# Patient Record
Sex: FEMALE | Race: WHITE | NOT HISPANIC OR LATINO | Employment: FULL TIME | URBAN - NONMETROPOLITAN AREA
[De-identification: names, ages, dates, MRNs, and addresses within clinical notes are randomized per-mention and may not be internally consistent; named-entity substitution may affect disease eponyms.]

---

## 2017-01-11 ENCOUNTER — OFFICE VISIT (OUTPATIENT)
Dept: RETAIL CLINIC | Facility: CLINIC | Age: 27
End: 2017-01-11

## 2017-01-11 VITALS
WEIGHT: 163.8 LBS | TEMPERATURE: 98.7 F | RESPIRATION RATE: 18 BRPM | BODY MASS INDEX: 29.02 KG/M2 | OXYGEN SATURATION: 97 % | HEART RATE: 85 BPM | SYSTOLIC BLOOD PRESSURE: 110 MMHG | DIASTOLIC BLOOD PRESSURE: 72 MMHG | HEIGHT: 63 IN

## 2017-01-11 DIAGNOSIS — B02.9 HERPES ZOSTER WITHOUT COMPLICATION: Primary | ICD-10-CM

## 2017-01-11 PROCEDURE — 99213 OFFICE O/P EST LOW 20 MIN: CPT | Performed by: NURSE PRACTITIONER

## 2017-01-11 RX ORDER — ALPRAZOLAM 0.25 MG/1
0.25 TABLET ORAL AS NEEDED
COMMUNITY
End: 2018-04-17 | Stop reason: ALTCHOICE

## 2017-01-11 RX ORDER — METHYLPREDNISOLONE 4 MG/1
TABLET ORAL
Qty: 21 TABLET | Refills: 0 | Status: SHIPPED | OUTPATIENT
Start: 2017-01-11 | End: 2018-04-17

## 2017-01-11 RX ORDER — NORGESTIMATE AND ETHINYL ESTRADIOL 7DAYSX3 LO
1 KIT ORAL DAILY
COMMUNITY
End: 2017-11-20

## 2017-01-11 RX ORDER — VALACYCLOVIR HYDROCHLORIDE 1 G/1
1000 TABLET, FILM COATED ORAL 3 TIMES DAILY
Qty: 21 TABLET | Refills: 0 | Status: SHIPPED | OUTPATIENT
Start: 2017-01-11 | End: 2017-01-18

## 2017-01-11 NOTE — PATIENT INSTRUCTIONS
Shingles  Shingles, which is also known as herpes zoster, is an infection that causes a painful skin rash and fluid-filled blisters. Shingles is not related to genital herpes, which is a sexually transmitted infection.     Shingles only develops in people who:  · Have had chickenpox.  · Have received the chickenpox vaccine. (This is rare.)  CAUSES  Shingles is caused by varicella-zoster virus (VZV). This is the same virus that causes chickenpox. After exposure to VZV, the virus stays in the body in an inactive (dormant) state. Shingles develops if the virus reactivates. This can happen many years after the initial exposure to VZV. It is not known what causes this virus to reactivate.  RISK FACTORS  People who have had chickenpox or received the chickenpox vaccine are at risk for shingles. Infection is more common in people who:  · Are older than age 50.  · Have a weakened defense (immune) system, such as those with HIV, AIDS, or cancer.  · Are taking medicines that weaken the immune system, such as transplant medicines.  · Are under great stress.  SYMPTOMS  Early symptoms of this condition include itching, tingling, and pain in an area on your skin. Pain may be described as burning, stabbing, or throbbing.  A few days or weeks after symptoms start, a painful red rash appears, usually on one side of the body in a bandlike or beltlike pattern. The rash eventually turns into fluid-filled blisters that break open, scab over, and dry up in about 2-3 weeks.  At any time during the infection, you may also develop:  · A fever.  · Chills.  · A headache.  · An upset stomach.  DIAGNOSIS  This condition is diagnosed with a skin exam. Sometimes, skin or fluid samples are taken from the blisters before a diagnosis is made. These samples are examined under a microscope or sent to a lab for testing.  TREATMENT  There is no specific cure for this condition. Your health care provider will probably prescribe medicines to help you  manage pain, recover more quickly, and avoid long-term problems. Medicines may include:  · Antiviral drugs.  · Anti-inflammatory drugs.  · Pain medicines.  If the area involved is on your face, you may be referred to a specialist, such as an eye doctor (ophthalmologist) or an ear, nose, and throat (ENT) doctor to help you avoid eye problems, chronic pain, or disability.  HOME CARE INSTRUCTIONS  Medicines  · Take medicines only as directed by your health care provider.  · Apply an anti-itch or numbing cream to the affected area as directed by your health care provider.  Blister and Rash Care  · Take a cool bath or apply cool compresses to the area of the rash or blisters as directed by your health care provider. This may help with pain and itching.  · Keep your rash covered with a loose bandage (dressing). Wear loose-fitting clothing to help ease the pain of material rubbing against the rash.  · Keep your rash and blisters clean with mild soap and cool water or as directed by your health care provider.  · Check your rash every day for signs of infection. These include redness, swelling, and pain that lasts or increases.  · Do not pick your blisters.  · Do not scratch your rash.  General Instructions  · Rest as directed by your health care provider.  · Keep all follow-up visits as directed by your health care provider. This is important.  · Until your blisters scab over, your infection can cause chickenpox in people who have never had it or been vaccinated against it. To prevent this from happening, avoid contact with other people, especially:    Babies.    Pregnant women.    Children who have eczema.    Elderly people who have transplants.    People who have chronic illnesses, such as leukemia or AIDS.  SEEK MEDICAL CARE IF:  · Your pain is not relieved with prescribed medicines.  · Your pain does not get better after the rash heals.  · Your rash looks infected. Signs of infection include redness, swelling, and pain  that lasts or increases.  SEEK IMMEDIATE MEDICAL CARE IF:  · The rash is on your face or nose.  · You have facial pain, pain around your eye area, or loss of feeling on one side of your face.  · You have ear pain or you have ringing in your ear.  · You have loss of taste.  · Your condition gets worse.     This information is not intended to replace advice given to you by your health care provider. Make sure you discuss any questions you have with your health care provider.     Document Released: 12/18/2006 Document Revised: 01/08/2016 Document Reviewed: 10/29/2015  [employer] Interactive Patient Education ©2016 [employer]    Valacyclovir caplets  What is this medicine?  VALACYCLOVIR (ewa ay SYE kloe veer) is an antiviral medicine. It is used to treat or prevent infections caused by certain kinds of viruses. Examples of these infections include herpes and shingles. This medicine will not cure herpes.  This medicine may be used for other purposes; ask your health care provider or pharmacist if you have questions.  What should I tell my health care provider before I take this medicine?  They need to know if you have any of these conditions:  -acquired immunodeficiency syndrome (AIDS)  -any other condition that may weaken the immune system  -bone marrow or kidney transplant  -kidney disease  -an unusual or allergic reaction to valacyclovir, acyclovir, ganciclovir, valganciclovir, other medicines, foods, dyes, or preservatives  -pregnant or trying to get pregnant  -breast-feeding  How should I use this medicine?  Take this medicine by mouth with a glass of water. Follow the directions on the prescription label. You can take this medicine with or without food. Take your doses at regular intervals. Do not take your medicine more often than directed. Finish the full course prescribed by your doctor or health care professional even if you think your condition is better. Do not stop taking except on the advice of your doctor  or health care professional.  Talk to your pediatrician regarding the use of this medicine in children. While this drug may be prescribed for children as young as 2 years for selected conditions, precautions do apply.  Overdosage: If you think you have taken too much of this medicine contact a poison control center or emergency room at once.  NOTE: This medicine is only for you. Do not share this medicine with others.  What if I miss a dose?  If you miss a dose, take it as soon as you can. If it is almost time for your next dose, take only that dose. Do not take double or extra doses.  What may interact with this medicine?  -cimetidine  -probenecid  This list may not describe all possible interactions. Give your health care provider a list of all the medicines, herbs, non-prescription drugs, or dietary supplements you use. Also tell them if you smoke, drink alcohol, or use illegal drugs. Some items may interact with your medicine.  What should I watch for while using this medicine?  Tell your doctor or health care professional if your symptoms do not start to get better after 1 week.  This medicine works best when taken early in the course of an infection, within the first 72 hours. Begin treatment as soon as possible after the first signs of infection like tingling, itching, or pain in the affected area.  It is possible that genital herpes may still be spread even when you are not having symptoms. Always use safer sex practices like condoms made of latex or polyurethane whenever you have sexual contact.  You should stay well hydrated while taking this medicine. Drink plenty of fluids.  What side effects may I notice from receiving this medicine?  Side effects that you should report to your doctor or health care professional as soon as possible:  -allergic reactions like skin rash, itching or hives, swelling of the face, lips, or tongue  -aggressive behavior  -confusion  -hallucinations  -problems with balance,  talking, walking  -stomach pain  -tremor  -trouble passing urine or change in the amount of urine  Side effects that usually do not require medical attention (report to your doctor or health care professional if they continue or are bothersome):  -dizziness  -headache  -nausea, vomiting  This list may not describe all possible side effects. Call your doctor for medical advice about side effects. You may report side effects to FDA at 2-320-FDA-9193.  Where should I keep my medicine?  Keep out of the reach of children.  Store at room temperature between 15 and 25 degrees C (59 and 77 degrees F). Keep container tightly closed. Throw away any unused medicine after the expiration date.  NOTE: This sheet is a summary. It may not cover all possible information. If you have questions about this medicine, talk to your doctor, pharmacist, or health care provider.     © 2016, [employer]. (2013-12-03 16:34:05)      Methylprednisolone tablets  What is this medicine?  METHYLPREDNISOLONE (meth ill pred NISS oh lone) is a corticosteroid. It is commonly used to treat inflammation of the skin, joints, lungs, and other organs. Common conditions treated include asthma, allergies, and arthritis. It is also used for other conditions, such as blood disorders and diseases of the adrenal glands.  This medicine may be used for other purposes; ask your health care provider or pharmacist if you have questions.  What should I tell my health care provider before I take this medicine?  They need to know if you have any of these conditions:  -Cushing's syndrome  -diabetes  -glaucoma  -heart problems or disease  -high blood pressure  -infection such as herpes, measles, tuberculosis, or chickenpox  -kidney disease  -liver disease  -mental problems  -myasthenia gravis  -osteoporosis  -seizures  -stomach ulcer or intestine disease including colitis and diverticulitis  -thyroid problem  -an unusual or allergic reaction to lactose,  methylprednisolone, other medicines, foods, dyes, or preservatives  -pregnant or trying to get pregnant  -breast-feeding  How should I use this medicine?  Take this medicine by mouth with a drink of water. Follow the directions on the prescription label. Take it with food or milk to avoid stomach upset. If you are taking this medicine once a day, take it in the morning. Do not take more medicine than you are told to take. Do not suddenly stop taking your medicine because you may develop a severe reaction. Your doctor will tell you how much medicine to take. If your doctor wants you to stop the medicine, the dose may be slowly lowered over time to avoid any side effects.  Talk to your pediatrician regarding the use of this medicine in children. Special care may be needed.  Overdosage: If you think you have taken too much of this medicine contact a poison control center or emergency room at once.  NOTE: This medicine is only for you. Do not share this medicine with others.  What if I miss a dose?  If you miss a dose, take it as soon as you can. If it is almost time for your next dose, talk to your doctor or health care professional. You may need to miss a dose or take an extra dose. Do not take double or extra doses without advice.  What may interact with this medicine?  Do not take this medicine with any of the following medications:  -mifepristone  This medicine may also interact with the following medications:  -tacrolimus  -vaccines  -warfarin  This list may not describe all possible interactions. Give your health care provider a list of all the medicines, herbs, non-prescription drugs, or dietary supplements you use. Also tell them if you smoke, drink alcohol, or use illegal drugs. Some items may interact with your medicine.  What should I watch for while using this medicine?  Visit your doctor or health care professional for regular checks on your progress. If you are taking this medicine for a long time, carry  an identification card with your name and address, the type and dose of your medicine, and your doctor's name and address.  The medicine may increase your risk of getting an infection. Stay away from people who are sick. Tell your doctor or health care professional if you are around anyone with measles or chickenpox.  If you are going to have surgery, tell your doctor or health care professional that you have taken this medicine within the last twelve months.  Ask your doctor or health care professional about your diet. You may need to lower the amount of salt you eat.  The medicine can increase your blood sugar. If you are a diabetic check with your doctor if you need help adjusting the dose of your diabetic medicine.  What side effects may I notice from receiving this medicine?  Side effects that you should report to your doctor or health care professional as soon as possible:  -allergic reactions like skin rash, itching or hives, swelling of the face, lips, or tongue  -eye pain, decreased or blurred vision, or bulging eyes  -fever, sore throat, sneezing, cough, or other signs of infection, wounds that will not heal  -increased thirst  -mental depression, mood swings, mistaken feelings of self importance or of being mistreated  -pain in hips, back, ribs, arms, shoulders, or legs  -swelling of the ankles, feet, hands  -trouble passing urine or change in the amount of urine  Side effects that usually do not require medical attention (report to your doctor or health care professional if they continue or are bothersome):  -confusion, excitement, restlessness  -headache  -nausea, vomiting  -skin problems, acne, thin and shiny skin  -weight gain  This list may not describe all possible side effects. Call your doctor for medical advice about side effects. You may report side effects to FDA at 6-201-FDA-2347.  Where should I keep my medicine?  Keep out of the reach of children.  Store at room temperature between 20 and 25  degrees C (68 and 77 degrees F). Throw away any unused medicine after the expiration date.  NOTE: This sheet is a summary. It may not cover all possible information. If you have questions about this medicine, talk to your doctor, pharmacist, or health care provider.     © 2016, [employer]. (2013-09-17 11:38:34)    Risks and benefits of medications discussed.  Keep lesions clean and dry. Monitor for signs and symptoms of secondary infection as discussed. Currently no evidence of eye involvement as the rash is low on your face.  If you have rash develop near your eye or you have concern about eye involvement please seek immediate evaluation (see your primary care, eye doctor or go to ER if needed) as you will need to be seen by an opthalmologist. You have verbalized understanding.

## 2017-01-11 NOTE — MR AVS SNAPSHOT
Lissa Aragon   1/11/2017 9:30 AM   Office Visit    Dept Phone:  [phone]   Encounter #:  [id number]    Provider:  JUAN VILLA [facility]   Department:  [facility]                Your Full Care Plan              Today's Medication Changes          These changes are accurate as of: 1/11/17 10:21 AM.  If you have any questions, ask your nurse or doctor.               New Medication(s)Ordered:     MethylPREDNISolone 4 MG tablet   Commonly known as:  MEDROL (KYLAH)   Take as directed on package instructions.       valACYclovir 1000 MG tablet   Commonly known as:  VALTREX   Take 1 tablet by mouth 3 (Three) Times a Day for 7 days.            Where to Get Your Medications      These medications were sent to [facility] [address] [place] [address] [phone] [place] [id number] [address] [address]     Phone:  [phone]     MethylPREDNISolone 4 MG tablet    valACYclovir 1000 MG tablet                  Your Updated Medication List          This list is accurate as of: 1/11/17 10:21 AM.  Always use your most recent med list.                ALPRAZolam 0.25 MG tablet   Commonly known as:  XANAX       MethylPREDNISolone 4 MG tablet   Commonly known as:  MEDROL (KYLAH)   Take as directed on package instructions.       ORTHO TRI-CYCLEN LO 0.18/0.215/0.25 MG-25 MCG per tablet   Generic drug:  norgestimate-ethinyl estradiol       TRINTELLIX 10 MG tablet   Generic drug:  Vortioxetine HBr       valACYclovir 1000 MG tablet   Commonly known as:  VALTREX   Take 1 tablet by mouth 3 (Three) Times a Day for 7 days.               You Were Diagnosed With        Codes Comments    Herpes zoster without complication    -  Primary ICD-10-CM: B02.9  ICD-9-CM: 053.9       Instructions    Shingles  Shingles, which is also known as herpes zoster, is an infection that causes a painful skin rash and fluid-filled blisters. Shingles is not related to genital herpes,  which is a sexually transmitted infection.     Shingles only develops in people who:  · Have had chickenpox.  · Have received the chickenpox vaccine. (This is rare.)  CAUSES  Shingles is caused by varicella-zoster virus (VZV). This is the same virus that causes chickenpox. After exposure to VZV, the virus stays in the body in an inactive (dormant) state. Shingles develops if the virus reactivates. This can happen many years after the initial exposure to VZV. It is not known what causes this virus to reactivate.  RISK FACTORS  People who have had chickenpox or received the chickenpox vaccine are at risk for shingles. Infection is more common in people who:  · Are older than age 50.  · Have a weakened defense (immune) system, such as those with HIV, AIDS, or cancer.  · Are taking medicines that weaken the immune system, such as transplant medicines.  · Are under great stress.  SYMPTOMS  Early symptoms of this condition include itching, tingling, and pain in an area on your skin. Pain may be described as burning, stabbing, or throbbing.  A few days or weeks after symptoms start, a painful red rash appears, usually on one side of the body in a bandlike or beltlike pattern. The rash eventually turns into fluid-filled blisters that break open, scab over, and dry up in about 2-3 weeks.  At any time during the infection, you may also develop:  · A fever.  · Chills.  · A headache.  · An upset stomach.  DIAGNOSIS  This condition is diagnosed with a skin exam. Sometimes, skin or fluid samples are taken from the blisters before a diagnosis is made. These samples are examined under a microscope or sent to a lab for testing.  TREATMENT  There is no specific cure for this condition. Your health care provider will probably prescribe medicines to help you manage pain, recover more quickly, and avoid long-term problems. Medicines may include:  · Antiviral drugs.  · Anti-inflammatory drugs.  · Pain medicines.  If the area involved is  on your face, you may be referred to a specialist, such as an eye doctor (ophthalmologist) or an ear, nose, and throat (ENT) doctor to help you avoid eye problems, chronic pain, or disability.  HOME CARE INSTRUCTIONS  Medicines  · Take medicines only as directed by your health care provider.  · Apply an anti-itch or numbing cream to the affected area as directed by your health care provider.  Blister and Rash Care  · Take a cool bath or apply cool compresses to the area of the rash or blisters as directed by your health care provider. This may help with pain and itching.  · Keep your rash covered with a loose bandage (dressing). Wear loose-fitting clothing to help ease the pain of material rubbing against the rash.  · Keep your rash and blisters clean with mild soap and cool water or as directed by your health care provider.  · Check your rash every day for signs of infection. These include redness, swelling, and pain that lasts or increases.  · Do not pick your blisters.  · Do not scratch your rash.  General Instructions  · Rest as directed by your health care provider.  · Keep all follow-up visits as directed by your health care provider. This is important.  · Until your blisters scab over, your infection can cause chickenpox in people who have never had it or been vaccinated against it. To prevent this from happening, avoid contact with other people, especially:    Babies.    Pregnant women.    Children who have eczema.    Elderly people who have transplants.    People who have chronic illnesses, such as leukemia or AIDS.  SEEK MEDICAL CARE IF:  · Your pain is not relieved with prescribed medicines.  · Your pain does not get better after the rash heals.  · Your rash looks infected. Signs of infection include redness, swelling, and pain that lasts or increases.  SEEK IMMEDIATE MEDICAL CARE IF:  · The rash is on your face or nose.  · You have facial pain, pain around your eye area, or loss of feeling on one side of  your face.  · You have ear pain or you have ringing in your ear.  · You have loss of taste.  · Your condition gets worse.     This information is not intended to replace advice given to you by your health care provider. Make sure you discuss any questions you have with your health care provider.     Document Released: 12/18/2006 Document Revised: 01/08/2016 Document Reviewed: 10/29/2015  [employer] Interactive Patient Education ©2016 Elsevier Inc.    Valacyclovir caplets  What is this medicine?  VALACYCLOVIR (ewa ay SYE ekaterina veer) is an antiviral medicine. It is used to treat or prevent infections caused by certain kinds of viruses. Examples of these infections include herpes and shingles. This medicine will not cure herpes.  This medicine may be used for other purposes; ask your health care provider or pharmacist if you have questions.  What should I tell my health care provider before I take this medicine?  They need to know if you have any of these conditions:  -acquired immunodeficiency syndrome (AIDS)  -any other condition that may weaken the immune system  -bone marrow or kidney transplant  -kidney disease  -an unusual or allergic reaction to valacyclovir, acyclovir, ganciclovir, valganciclovir, other medicines, foods, dyes, or preservatives  -pregnant or trying to get pregnant  -breast-feeding  How should I use this medicine?  Take this medicine by mouth with a glass of water. Follow the directions on the prescription label. You can take this medicine with or without food. Take your doses at regular intervals. Do not take your medicine more often than directed. Finish the full course prescribed by your doctor or health care professional even if you think your condition is better. Do not stop taking except on the advice of your doctor or health care professional.  Talk to your pediatrician regarding the use of this medicine in children. While this drug may be prescribed for children as young as 2 years for  selected conditions, precautions do apply.  Overdosage: If you think you have taken too much of this medicine contact a poison control center or emergency room at once.  NOTE: This medicine is only for you. Do not share this medicine with others.  What if I miss a dose?  If you miss a dose, take it as soon as you can. If it is almost time for your next dose, take only that dose. Do not take double or extra doses.  What may interact with this medicine?  -cimetidine  -probenecid  This list may not describe all possible interactions. Give your health care provider a list of all the medicines, herbs, non-prescription drugs, or dietary supplements you use. Also tell them if you smoke, drink alcohol, or use illegal drugs. Some items may interact with your medicine.  What should I watch for while using this medicine?  Tell your doctor or health care professional if your symptoms do not start to get better after 1 week.  This medicine works best when taken early in the course of an infection, within the first 72 hours. Begin treatment as soon as possible after the first signs of infection like tingling, itching, or pain in the affected area.  It is possible that genital herpes may still be spread even when you are not having symptoms. Always use safer sex practices like condoms made of latex or polyurethane whenever you have sexual contact.  You should stay well hydrated while taking this medicine. Drink plenty of fluids.  What side effects may I notice from receiving this medicine?  Side effects that you should report to your doctor or health care professional as soon as possible:  -allergic reactions like skin rash, itching or hives, swelling of the face, lips, or tongue  -aggressive behavior  -confusion  -hallucinations  -problems with balance, talking, walking  -stomach pain  -tremor  -trouble passing urine or change in the amount of urine  Side effects that usually do not require medical attention (report to your  doctor or health care professional if they continue or are bothersome):  -dizziness  -headache  -nausea, vomiting  This list may not describe all possible side effects. Call your doctor for medical advice about side effects. You may report side effects to FDA at 3-792-FDA-4722.  Where should I keep my medicine?  Keep out of the reach of children.  Store at room temperature between 15 and 25 degrees C (59 and 77 degrees F). Keep container tightly closed. Throw away any unused medicine after the expiration date.  NOTE: This sheet is a summary. It may not cover all possible information. If you have questions about this medicine, talk to your doctor, pharmacist, or health care provider.     © 2016, Else[employer]/Gold Standard. (2013-12-03 16:34:05)      Methylprednisolone tablets  What is this medicine?  METHYLPREDNISOLONE (meth ill pred NISS oh lone) is a corticosteroid. It is commonly used to treat inflammation of the skin, joints, lungs, and other organs. Common conditions treated include asthma, allergies, and arthritis. It is also used for other conditions, such as blood disorders and diseases of the adrenal glands.  This medicine may be used for other purposes; ask your health care provider or pharmacist if you have questions.  What should I tell my health care provider before I take this medicine?  They need to know if you have any of these conditions:  -Cushing's syndrome  -diabetes  -glaucoma  -heart problems or disease  -high blood pressure  -infection such as herpes, measles, tuberculosis, or chickenpox  -kidney disease  -liver disease  -mental problems  -myasthenia gravis  -osteoporosis  -seizures  -stomach ulcer or intestine disease including colitis and diverticulitis  -thyroid problem  -an unusual or allergic reaction to lactose, methylprednisolone, other medicines, foods, dyes, or preservatives  -pregnant or trying to get pregnant  -breast-feeding  How should I use this medicine?  Take this medicine by mouth  with a drink of water. Follow the directions on the prescription label. Take it with food or milk to avoid stomach upset. If you are taking this medicine once a day, take it in the morning. Do not take more medicine than you are told to take. Do not suddenly stop taking your medicine because you may develop a severe reaction. Your doctor will tell you how much medicine to take. If your doctor wants you to stop the medicine, the dose may be slowly lowered over time to avoid any side effects.  Talk to your pediatrician regarding the use of this medicine in children. Special care may be needed.  Overdosage: If you think you have taken too much of this medicine contact a poison control center or emergency room at once.  NOTE: This medicine is only for you. Do not share this medicine with others.  What if I miss a dose?  If you miss a dose, take it as soon as you can. If it is almost time for your next dose, talk to your doctor or health care professional. You may need to miss a dose or take an extra dose. Do not take double or extra doses without advice.  What may interact with this medicine?  Do not take this medicine with any of the following medications:  -mifepristone  This medicine may also interact with the following medications:  -tacrolimus  -vaccines  -warfarin  This list may not describe all possible interactions. Give your health care provider a list of all the medicines, herbs, non-prescription drugs, or dietary supplements you use. Also tell them if you smoke, drink alcohol, or use illegal drugs. Some items may interact with your medicine.  What should I watch for while using this medicine?  Visit your doctor or health care professional for regular checks on your progress. If you are taking this medicine for a long time, carry an identification card with your name and address, the type and dose of your medicine, and your doctor's name and address.  The medicine may increase your risk of getting an  infection. Stay away from people who are sick. Tell your doctor or health care professional if you are around anyone with measles or chickenpox.  If you are going to have surgery, tell your doctor or health care professional that you have taken this medicine within the last twelve months.  Ask your doctor or health care professional about your diet. You may need to lower the amount of salt you eat.  The medicine can increase your blood sugar. If you are a diabetic check with your doctor if you need help adjusting the dose of your diabetic medicine.  What side effects may I notice from receiving this medicine?  Side effects that you should report to your doctor or health care professional as soon as possible:  -allergic reactions like skin rash, itching or hives, swelling of the face, lips, or tongue  -eye pain, decreased or blurred vision, or bulging eyes  -fever, sore throat, sneezing, cough, or other signs of infection, wounds that will not heal  -increased thirst  -mental depression, mood swings, mistaken feelings of self importance or of being mistreated  -pain in hips, back, ribs, arms, shoulders, or legs  -swelling of the ankles, feet, hands  -trouble passing urine or change in the amount of urine  Side effects that usually do not require medical attention (report to your doctor or health care professional if they continue or are bothersome):  -confusion, excitement, restlessness  -headache  -nausea, vomiting  -skin problems, acne, thin and shiny skin  -weight gain  This list may not describe all possible side effects. Call your doctor for medical advice about side effects. You may report side effects to FDA at 2-832-FDA-3662.  Where should I keep my medicine?  Keep out of the reach of children.  Store at room temperature between 20 and 25 degrees C (68 and 77 degrees F). Throw away any unused medicine after the expiration date.  NOTE: This sheet is a summary. It may not cover all possible information. If you  have questions about this medicine, talk to your doctor, pharmacist, or health care provider.     © 2016, Elsevier/Gold Standard. (2013 11:38:34)    Risks and benefits of medications.   Keep lesions clean and dry. Monitor for signs and symptoms of secondary infection as discussed. Currently no evidence of eye involvement as the rash is low on your face.  If you have rash develop near your eye or you have concern about eye involvement please seek immediate evaluation (see your primary care, eye doctor or go to ER if needed) as you will need to be seen by an opthalmologist. You have verbalized understanding.      Patient Instructions History      Upcoming Appointments     Visit Type Date Time Department    OFFICE VISIT 2017  9:30 AM [facility] [place]      [employer] Signup     [facility] [employer] allows you to send messages to your doctor, view your test results, renew your prescriptions, schedule appointments, and more. To sign up, go to [facility] and click on the Sign Up Now link in the New User? box. Enter your [employer] Activation Code exactly as it appears below along with the last four digits of your Social Security Number and your Date of Birth () to complete the sign-up process. If you do not sign up before the expiration date, you must request a new code.    [employer] Activation Code: [email or web address]  Expires: 2017 10:19 AM    If you have questions, you can email [email or web address] or call [phone] to talk to our [employer] staff. Remember, [employer] is NOT to be used for urgent needs. For medical emergencies, dial 911.               Other Info from Your Visit           Allergies     Ibuprofen  Swelling    Facial swelling      Reason for Visit     Earache For the last couple days-left side only    Rash Left side of face and down neck; states it is painful and burns; showed up Monday      Vital Signs     Blood Pressure Pulse Temperature Respirations Height Weight  "   110/72 (BP Location: Right arm, Patient Position: Sitting, Cuff Size: Adult) 85 98.7 °F (37.1 °C) (Oral) 18 63\" (160 cm) 163 lb 12.8 oz (74.3 kg)    Last Menstrual Period Oxygen Saturation Body Mass Index Smoking Status          12/14/2016 (Approximate) 97% 29.02 kg/m2 Never Smoker        Problems and Diagnoses Noted     Herpes zoster without complication    -  Primary        "

## 2017-01-11 NOTE — PROGRESS NOTES
Chief Complaint   Patient presents with   • Earache     For the last couple days-left side only   • Rash     Left side of face and down neck; states it is painful and burns; showed up Monday     Subjective   Lissa Aragon is a 26 y.o. female who presents to the clinic today with complaints left ear pain which started a couple of days ago. She also starting having a burning sensation to the left side of her face and neck and now has a rash in the same area.   She reports the rash is burning an painful to light touch.  She denies new exposures to soaps, lotions, clothes. She has been under more stress lately as she is in the process of buying a house, moving and looking for a new job.     She reports taking steroid packs previously and tolerating them well.     The following portions of the patient's history were reviewed and updated as appropriate: allergies, past family history, past medical history, past social history, past surgical history and problem list.      Current Outpatient Prescriptions:   •  ALPRAZolam (XANAX) 0.25 MG tablet, Take 0.25 mg by mouth As Needed for anxiety., Disp: , Rfl:   •  norgestimate-ethinyl estradiol (ORTHO TRI-CYCLEN LO) 0.18/0.215/0.25 MG-25 MCG per tablet, Take 1 tablet by mouth Daily., Disp: , Rfl:   •  Vortioxetine HBr (TRINTELLIX) 10 MG tablet, Take 1 tablet by mouth Daily., Disp: , Rfl:     Allergies:  Ibuprofen     History reviewed. No pertinent past medical history.     Past Surgical History   Procedure Laterality Date   • Ear tubes       X 3 as a child     Family History   Problem Relation Age of Onset   • Cancer Mother    • Hypertension Father      Social History   Substance Use Topics   • Smoking status: Never Smoker   • Smokeless tobacco: Never Used   • Alcohol use Yes      Comment: Rarely       Review of Systems  Review of Systems   Constitutional: Negative for chills and fever.   HENT: Positive for ear pain (left). Negative for postnasal drip, rhinorrhea, sinus  "pressure, sneezing and sore throat.    Skin: Positive for rash (left face and neck).       Objective   Visit Vitals   • /72 (BP Location: Right arm, Patient Position: Sitting, Cuff Size: Adult)   • Pulse 85   • Temp 98.7 °F (37.1 °C) (Oral)   • Resp 18   • Ht 63\" (160 cm)   • Wt 163 lb 12.8 oz (74.3 kg)   • LMP 12/14/2016 (Approximate)   • SpO2 97%   • BMI 29.02 kg/m2     Last 2 weights    01/11/17  0944   Weight: 163 lb 12.8 oz (74.3 kg)       Physical Exam   Constitutional: She is oriented to person, place, and time. She appears well-developed and well-nourished. She is cooperative.   HENT:   Head: Normocephalic and atraumatic.   Right Ear: External ear and ear canal normal. Tympanic membrane is scarred.   Left Ear: External ear and ear canal normal. Tympanic membrane is scarred.   Eyes: Conjunctivae, EOM and lids are normal. Pupils are equal, round, and reactive to light.   Neck: Trachea normal and normal range of motion. Neck supple.   Cardiovascular: Normal rate, regular rhythm, S1 normal, S2 normal and normal heart sounds.    Pulmonary/Chest: Effort normal and breath sounds normal. She has no wheezes. She has no rhonchi. She has no rales.   Lymphadenopathy:     She has no cervical adenopathy.   Neurological: She is alert and oriented to person, place, and time. Coordination and gait normal.   Skin: Skin is warm, dry and intact. Rash (erythemic vesicular rash noted to left face mid face and down, few lesions noted to neck,  erythemic papular lesions noted to left chest.  No sign/symptoms of secondary infection.) noted.   Psychiatric: She has a normal mood and affect. Her speech is normal and behavior is normal.   Skin: lesions to chest are left upper chest    Assessment/Plan     Lissa was seen today for earache and rash.    Diagnoses and all orders for this visit:    Herpes zoster without complication    Other orders  -     valACYclovir (VALTREX) 1000 MG tablet; Take 1 tablet by mouth 3 (Three) Times a " Day for 7 days.  -     MethylPREDNISolone (MEDROL, KYLAH,) 4 MG tablet; Take as directed on package instructions.      Risks and benefits of medications discussed.  Keep lesions clean and dry. Monitor for signs and symptoms of secondary infection as discussed. Currently no evidence of eye involvement as the rash is low on your face.  If you have rash develop near your eye or you have concern about eye involvement please seek immediate evaluation (see your primary care, eye doctor or go to ER if needed) as you will need to be seen by an opthalmologist. You have verbalized understanding.

## 2017-11-20 ENCOUNTER — OFFICE VISIT (OUTPATIENT)
Dept: OBSTETRICS AND GYNECOLOGY | Facility: CLINIC | Age: 27
End: 2017-11-20

## 2017-11-20 VITALS
DIASTOLIC BLOOD PRESSURE: 68 MMHG | HEART RATE: 75 BPM | WEIGHT: 172.2 LBS | BODY MASS INDEX: 30.51 KG/M2 | SYSTOLIC BLOOD PRESSURE: 110 MMHG | HEIGHT: 63 IN | RESPIRATION RATE: 16 BRPM

## 2017-11-20 DIAGNOSIS — R68.82 REDUCED LIBIDO: ICD-10-CM

## 2017-11-20 DIAGNOSIS — N76.0: ICD-10-CM

## 2017-11-20 DIAGNOSIS — Z01.419 ENCOUNTER FOR GYNECOLOGICAL EXAMINATION WITH PAPANICOLAOU SMEAR OF CERVIX: Primary | ICD-10-CM

## 2017-11-20 PROCEDURE — 99385 PREV VISIT NEW AGE 18-39: CPT | Performed by: NURSE PRACTITIONER

## 2017-11-20 PROCEDURE — 88142 CYTOPATH C/V THIN LAYER: CPT | Performed by: PATHOLOGY

## 2017-11-20 PROCEDURE — 87210 SMEAR WET MOUNT SALINE/INK: CPT | Performed by: NURSE PRACTITIONER

## 2017-11-20 RX ORDER — NORGESTIMATE AND ETHINYL ESTRADIOL 7DAYSX3 28
1 KIT ORAL
COMMUNITY
End: 2018-01-02 | Stop reason: SDUPTHER

## 2017-11-20 RX ORDER — FLUCONAZOLE 150 MG/1
150 TABLET ORAL ONCE
Qty: 2 TABLET | Refills: 0 | Status: SHIPPED | OUTPATIENT
Start: 2017-11-20 | End: 2017-11-20

## 2017-11-20 RX ORDER — METRONIDAZOLE 500 MG/1
500 TABLET ORAL 2 TIMES DAILY
Qty: 14 TABLET | Refills: 0 | Status: SHIPPED | OUTPATIENT
Start: 2017-11-20 | End: 2017-11-27

## 2017-11-21 NOTE — PROGRESS NOTES
"Subjective   Chief Complaint   Patient presents with   • Gynecologic Exam     well woman annual visit     Lissa Aragon is a 26 y.o. year old  presenting to be seen for her annual exam.  Today she has concerns about low libido on Trintellix and continues on Zoloft. She states she was on these medications for anxiety and depression prescribed by PCP. She has only been on Zoloft for a couple of weeks and understands that it may take longer to see effectiveness. We discussed the sexual side effects of these medications.  I recommend she continue with Zoloft for now and monitor libido changes. If no great improvements in either, I recommend Wellbutrin for it's neutral sexual side effects and occasional positive ones. Pt verbalizes understanding. She can consult with her PCP or me regarding these concerns.     Pt also has concerns about \"recurrent yeast infections\" before her period. She notes as a teenager having frequent yeast infections. In the last 6 months she notes symptoms in the week before menses that resolve on their own. Symptoms include: itching, irritated, thicker, white/clear discharge, no odor. She is currently experiencing mild symptoms.     Patient's last menstrual period was 10/28/2017 (exact date).    OB History      Para Term  AB Living    0 0 0 0 0 0    SAB TAB Ectopic Multiple Live Births    0 0 0 0 0          Current birth control method: OCP (estrogen/progesterone).    She is sexually active.  In the past 12 months there has not been new sexual partners.  Condoms are not typically used.  She would not like to be screened for STD's at today's exam.     Past 6 month menstrual history:    Cycle Frequency: regular, predictable and consistent every 28 - 32 days   Menstrual cycle character: flow is typically light   Cycle Duration: 3 - 3   Number of heavy days of flows: 0   Dysmenorrhea: none   PMS: none   Intermenstrual bleeding present: no   Post-coital bleeding present: no " "    She exercises regularly: yes.  She wears her seat belt:yes.  She has concerns about domestic violence: no.  Last colonoscopy: Never  Last DEXA: Never  Last MMG: Never  Last pap: 2016, Dr. Caldera in [place], [place]  History of abnormal PAP: No    The following portions of the patient's history were reviewed and updated as appropriate:problem list, current medications, allergies, past family history, past medical history, past social history and past surgical history.    Smoking status: Never Smoker                                                              Smokeless status: Never Used                        History   Alcohol Use   • Yes     Comment: Rarely      Review of Systems   Constitutional: Negative.  Negative for chills and fever.   Respiratory: Negative.    Cardiovascular: Negative.    Gastrointestinal: Negative.    Endocrine: Negative.  Negative for cold intolerance, heat intolerance, polydipsia, polyphagia and polyuria.   Genitourinary: Positive for vaginal discharge and vaginal pain (irritation). Negative for dyspareunia, dysuria, frequency, genital sores, menstrual problem, pelvic pain and urgency.   Skin: Negative.    Neurological: Negative for dizziness, syncope, light-headedness and headaches.   Psychiatric/Behavioral: Negative for suicidal ideas. The patient is nervous/anxious.         Depression, not well controlled at this time         Objective   /68 (BP Location: Right arm, Patient Position: Sitting, Cuff Size: Adult)  Pulse 75  Resp 16  Ht 63\" (160 cm)  Wt 172 lb 3.2 oz (78.1 kg)  LMP 10/28/2017 (Exact Date)  Breastfeeding? No  BMI 30.5 kg/m2    General:  well developed; well nourished  no acute distress   Skin:  No suspicious lesions seen   Thyroid: not examined   Breasts:  Examined in supine position  Symmetric without masses or skin dimpling  Nipples normal without inversion, lesions or discharge  There are no palpable axillary nodes   Abdomen: soft, non-tender; no masses  no " umbilical or inginual hernias are present  no hepato-splenomegaly  Normal findings: bowel sounds normal   Cardiac: Heart sounds are normal.  Regular rate and rhythm without murmur, gallop or rub.   Resp: Normal expansion.  Clear to auscultation.  No rales, rhonchi, or wheezing.   Psych: alert,oriented, in NAD with a full range of affect, normal behavior and no psychotic features   Pelvis: Uterus:  Clinical staff was present for exam  External genitalia:  normal appearance of the external genitalia including Bartholin's and [place]'s glands. Moderate erythema of the introitus noted.  :  urethral meatus normal;  Vaginal:  normal pink mucosa without prolapse or lesions, discharge present -  clear, white and moderately thin, no odor noted and wet prep done: finding =  normal epithelial cells are present, clue cells are present, pseudo-hyphae are 3 present, yeast bud 2 present, trichomonads are absent and excess WBC's are absent  Cervix:  normal appearance.  Uterus:  normal size, shape and consistency  Adnexa:  normal bimanual exam of the adnexa.     Lab Review   No data reviewed    Imaging  No data reviewed       Assessment   1. Encounter for GYN exam with pap smear of cervix  2. Cyclic vulvovaginitis  3. Reduced libido     Plan   1. Pap results: I will send card in mail or call if abnormal. RTC annually for well woman exams  2. Discussed wet prep and exam findings consistent with both yeast and bacterial infection. Gave pt and reviewed vulvar hygiene guidelines. Recommend against use of vagisil and summer's ricky washes. Dove white unscented only. Pt will monitor symptoms and RTC with recurrence for recheck and consideration of cyclic treatment PRN  3. Discussed libido and women's sexual cycle at length. Tips to boost libido provided. I recommend she continue with Zoloft for now and monitor libido changes. If no great improvements in either, I recommend Wellbutrin for it's neutral sexual side effects and occasional  positive ones. Pt verbalizes understanding. She can consult with her PCP or me regarding these concerns.       New Medications Ordered This Visit   Medications   • fluconazole (DIFLUCAN) 150 MG tablet     Sig: Take 1 tablet by mouth 1 (One) Time for 1 dose. Repeat dose in 72 hours if still symptomatic     Dispense:  2 tablet     Refill:  0   • metroNIDAZOLE (FLAGYL) 500 MG tablet     Sig: Take 1 tablet by mouth 2 (Two) Times a Day for 7 days. No alcohol up to 48 hours after last dose     Dispense:  14 tablet     Refill:  0          This note was electronically signed.    Anya Camarena, APRN  November 21, 2017

## 2017-11-28 LAB
LAB AP CASE REPORT: NORMAL
LAB AP GYN ADDITIONAL INFORMATION: NORMAL
LAB AP GYN OTHER FINDINGS: NORMAL
Lab: NORMAL
PATH INTERP SPEC-IMP: NORMAL
STAT OF ADQ CVX/VAG CYTO-IMP: NORMAL

## 2018-01-02 RX ORDER — NORGESTIMATE AND ETHINYL ESTRADIOL 7DAYSX3 28
1 KIT ORAL DAILY
Qty: 28 TABLET | Refills: 11 | Status: SHIPPED | OUTPATIENT
Start: 2018-01-02 | End: 2018-11-26 | Stop reason: SDUPTHER

## 2018-04-17 ENCOUNTER — OFFICE VISIT (OUTPATIENT)
Dept: OBSTETRICS AND GYNECOLOGY | Facility: CLINIC | Age: 28
End: 2018-04-17

## 2018-04-17 VITALS
BODY MASS INDEX: 30.16 KG/M2 | SYSTOLIC BLOOD PRESSURE: 110 MMHG | DIASTOLIC BLOOD PRESSURE: 70 MMHG | WEIGHT: 170.2 LBS | RESPIRATION RATE: 16 BRPM | HEART RATE: 93 BPM | HEIGHT: 63 IN

## 2018-04-17 DIAGNOSIS — B37.31 CANDIDA VAGINITIS: Primary | ICD-10-CM

## 2018-04-17 DIAGNOSIS — R68.82 LOW LIBIDO: ICD-10-CM

## 2018-04-17 DIAGNOSIS — L70.0 ACNE VULGARIS: ICD-10-CM

## 2018-04-17 PROCEDURE — 87210 SMEAR WET MOUNT SALINE/INK: CPT | Performed by: NURSE PRACTITIONER

## 2018-04-17 PROCEDURE — 99213 OFFICE O/P EST LOW 20 MIN: CPT | Performed by: NURSE PRACTITIONER

## 2018-04-17 RX ORDER — CLINDAMYCIN PHOSPHATE 10 MG/G
GEL TOPICAL EVERY 12 HOURS SCHEDULED
Qty: 30 G | Refills: 1 | Status: SHIPPED | OUTPATIENT
Start: 2018-04-17 | End: 2019-06-19

## 2018-04-17 RX ORDER — CYCLOBENZAPRINE HCL 10 MG
10 TABLET ORAL
COMMUNITY
End: 2019-12-06

## 2018-04-17 RX ORDER — BUPROPION HYDROCHLORIDE 75 MG/1
75 TABLET ORAL DAILY
COMMUNITY
End: 2018-11-26 | Stop reason: DRUGHIGH

## 2018-04-17 RX ORDER — FLUCONAZOLE 150 MG/1
150 TABLET ORAL ONCE
Qty: 2 TABLET | Refills: 0 | Status: SHIPPED | OUTPATIENT
Start: 2018-04-17 | End: 2018-04-17

## 2018-04-17 NOTE — PROGRESS NOTES
Subjective   Lissa Aragon is a 27 y.o. female.     History of Present Illness   Pt presents with complaints of severe vaginal itching, irritation, white discharge. Denies odor and denies recent antibiotic use or changes in hygiene products. She has hx of cyclic vulvovaginitis. Last treated with Flagyl and Diflucan 11/20/17 and denies having any symptoms since, until now x 1 week.     She recently became engaged and is planning a wedding for Jan 2019. She believes acne is related to stress but has noted breakouts along the jaw line and cheeks. She continues to take OCP without concerns.     The following portions of the patient's history were reviewed and updated as appropriate: allergies, current medications, past family history, past medical history, past social history, past surgical history and problem list.    Review of Systems   Constitutional: Negative.  Negative for chills and fever.   Respiratory: Negative.    Cardiovascular: Negative.    Genitourinary: Positive for decreased libido (chronic, just started Wellbutrin 75mg once daily. May consider stopping zoloft at next visit with PCP ), vaginal discharge and vaginal pain (irritation and itching). Negative for dysuria, genital sores, menstrual problem, pelvic pain and vaginal bleeding.   Skin:        acne   Psychiatric/Behavioral: Positive for depressed mood. The patient is nervous/anxious.         Being managed by PCP       Objective    Vitals:    04/17/18 0837   BP: 110/70   Pulse: 93   Resp: 16     1    04/17/18  0837   Weight: 77.2 kg (170 lb 3.2 oz)     Body mass index is 30.15 kg/m².    Physical Exam   Constitutional: She is oriented to person, place, and time. She appears well-developed and well-nourished.   Cardiovascular: Normal rate, regular rhythm and normal heart sounds.    Pulmonary/Chest: Effort normal and breath sounds normal.   Genitourinary: Uterus normal and cervix normal. There is no rash, tenderness or lesion on the right labia. There is  no rash, tenderness or lesion on the left labia. Right adnexum displays no mass, no tenderness and no fullness. Left adnexum displays no mass, no tenderness and no fullness. There is erythema and tenderness in the vagina. No bleeding in the vagina. No foreign body in the vagina. No signs of injury around the vagina. Vaginal discharge (copious thick white discharge, no odor noted, wet prep obtained) found.   Genitourinary Comments: Wet prep: positive for WBC and hyphae TNTC, no budding yeast, some bacteria and minimal clue cells. Evaluated by COLETTE Chávez   Neurological: She is alert and oriented to person, place, and time.   Vitals reviewed.    Assessment/Plan   Lissa was seen today for vaginitis.    Diagnoses and all orders for this visit:    Candida vaginitis  -     fluconazole (DIFLUCAN) 150 MG tablet; Take 1 tablet by mouth 1 (One) Time for 1 dose. Repeat dose in 72 hours if still symptomatic    Acne vulgaris  -     clindamycin (CLINDAGEL) 1 % gel; Apply  topically Every 12 (Twelve) Hours.    Low libido    Discussed wet prep findings. Treat with Diflucan 150mg x 2 doses. Pt to call if symptoms persist after treatment and I will add Flagyl PRN.     Apply clindamycin gel to acnes BID PRN for flare ups. Encouraged OTC benzoyl peroxide wash. Warned of bleaching clothes and towels. She voices understanding and will RTC PRN.     Discussed Zoloft's effects on libido. I recommend if 1 more month on Wellbutrin 75mg hasn't improved desire, increase to Wellbutrin XL 150mg and consider stopping zoloft. She is scheduled with PCP in a month and will discuss this further with her. If anxiety is still a concern with stopping Zoloft, consider Buspar.

## 2018-09-06 ENCOUNTER — LAB (OUTPATIENT)
Dept: LAB | Facility: OTHER | Age: 28
End: 2018-09-06

## 2018-09-06 ENCOUNTER — OFFICE VISIT (OUTPATIENT)
Dept: FAMILY MEDICINE CLINIC | Facility: CLINIC | Age: 28
End: 2018-09-06

## 2018-09-06 VITALS
BODY MASS INDEX: 29.38 KG/M2 | HEIGHT: 63 IN | TEMPERATURE: 97.6 F | DIASTOLIC BLOOD PRESSURE: 70 MMHG | OXYGEN SATURATION: 98 % | HEART RATE: 81 BPM | WEIGHT: 165.8 LBS | SYSTOLIC BLOOD PRESSURE: 118 MMHG

## 2018-09-06 DIAGNOSIS — N89.8 VAGINAL ITCHING: ICD-10-CM

## 2018-09-06 DIAGNOSIS — R30.0 BURNING WITH URINATION: ICD-10-CM

## 2018-09-06 DIAGNOSIS — R30.0 BURNING WITH URINATION: Primary | ICD-10-CM

## 2018-09-06 LAB
BILIRUB UR QL STRIP: NEGATIVE
CLARITY UR: CLEAR
COLOR UR: YELLOW
GLUCOSE UR STRIP-MCNC: NEGATIVE MG/DL
HGB UR QL STRIP.AUTO: NEGATIVE
KETONES UR QL STRIP: NEGATIVE
LEUKOCYTE ESTERASE UR QL STRIP.AUTO: NEGATIVE
NITRITE UR QL STRIP: NEGATIVE
PH UR STRIP.AUTO: 7 [PH] (ref 5.5–8)
PROT UR QL STRIP: NEGATIVE
SP GR UR STRIP: 1.02 (ref 1–1.03)
UROBILINOGEN UR QL STRIP: NORMAL

## 2018-09-06 PROCEDURE — 99213 OFFICE O/P EST LOW 20 MIN: CPT | Performed by: NURSE PRACTITIONER

## 2018-09-06 PROCEDURE — 81003 URINALYSIS AUTO W/O SCOPE: CPT | Performed by: NURSE PRACTITIONER

## 2018-09-06 RX ORDER — FLUCONAZOLE 150 MG/1
TABLET ORAL
Qty: 2 TABLET | Refills: 1 | Status: SHIPPED | OUTPATIENT
Start: 2018-09-06 | End: 2018-11-26

## 2018-09-13 ENCOUNTER — TELEPHONE (OUTPATIENT)
Dept: FAMILY MEDICINE CLINIC | Facility: CLINIC | Age: 28
End: 2018-09-13

## 2018-09-14 ENCOUNTER — TELEPHONE (OUTPATIENT)
Dept: FAMILY MEDICINE CLINIC | Facility: CLINIC | Age: 28
End: 2018-09-14

## 2018-09-20 PROBLEM — R30.0 BURNING WITH URINATION: Status: ACTIVE | Noted: 2018-09-20

## 2018-09-20 PROBLEM — N89.8 VAGINAL ITCHING: Status: ACTIVE | Noted: 2018-09-20

## 2018-09-21 NOTE — PROGRESS NOTES
Subjective   Lissa Aragon is a 27 y.o. female who presents to the office complaining of occasional dysuria and thick, white vaginal discharge causing her to scratch at skin.  Tells me she's had several bacterial infections.  PCP is NUBIA Jade.  History of Present Illness     The following portions of the patient's history were reviewed and updated as appropriate: allergies, current medications, past family history, past medical history, past social history, past surgical history and problem list.    Review of Systems   Constitutional: Negative for chills, fatigue and fever.   HENT: Negative for congestion, sneezing, sore throat and trouble swallowing.    Eyes: Negative for visual disturbance.   Respiratory: Negative for cough, chest tightness, shortness of breath and wheezing.    Cardiovascular: Negative for chest pain, palpitations and leg swelling.   Gastrointestinal: Negative for abdominal pain, constipation, diarrhea, nausea and vomiting.   Genitourinary: Positive for dysuria and vaginal discharge. Negative for frequency and urgency.   Musculoskeletal: Negative for neck pain.   Skin: Negative for rash.   Neurological: Negative for dizziness, weakness and headaches.   Psychiatric/Behavioral:        In the past two weeks the pt has not felt down, depressed, hopeless or lost interest in doing things   All other systems reviewed and are negative.      Objective   Physical Exam   Constitutional: She is oriented to person, place, and time. She appears well-developed and well-nourished. She is cooperative.  Non-toxic appearance. She does not have a sickly appearance.   HENT:   Head: Normocephalic and atraumatic.   Right Ear: External ear normal.   Left Ear: External ear normal.   Nose: Nose normal.   Mouth/Throat: Oropharynx is clear and moist.   Eyes: Pupils are equal, round, and reactive to light. Conjunctivae and EOM are normal. Right eye exhibits no discharge. Left eye exhibits no discharge. No scleral icterus.    Neck: Normal range of motion. Neck supple. No thyromegaly present.   Cardiovascular: Normal rate, regular rhythm, normal heart sounds and intact distal pulses.  Exam reveals no gallop and no friction rub.    No murmur heard.  Pulmonary/Chest: Effort normal and breath sounds normal. No respiratory distress. She has no wheezes. She has no rales.   Abdominal: Soft. Normal appearance and bowel sounds are normal. She exhibits no distension. There is no tenderness. There is no rebound, no guarding and no CVA tenderness.   Musculoskeletal: Normal range of motion. She exhibits no edema.   Lymphadenopathy:     She has no cervical adenopathy.   Neurological: She is alert and oriented to person, place, and time. No cranial nerve deficit. GCS eye subscore is 4. GCS verbal subscore is 5. GCS motor subscore is 6.   Skin: Skin is warm, dry and intact. No rash noted.   Psychiatric: She has a normal mood and affect. Her behavior is normal. Judgment and thought content normal.   Nursing note and vitals reviewed.      Assessment/Plan   Lissa was seen today for urinary tract infection.    Diagnoses and all orders for this visit:    Burning with urination  -     Urinalysis With Microscopic If Indicated (No Culture) - Urine, Clean Catch; Future  -     Urinalysis With Culture If Indicated - Urine, Clean Catch; Future    Vaginal itching    Other orders  -     fluconazole (DIFLUCAN) 150 MG tablet; Take one tablet by mouth today and then in three days.    Encouraged UTI precautions.  Increase water intake.

## 2018-11-26 ENCOUNTER — PROCEDURE VISIT (OUTPATIENT)
Dept: OBSTETRICS AND GYNECOLOGY | Facility: CLINIC | Age: 28
End: 2018-11-26

## 2018-11-26 VITALS
WEIGHT: 171 LBS | HEIGHT: 63 IN | RESPIRATION RATE: 16 BRPM | HEART RATE: 84 BPM | SYSTOLIC BLOOD PRESSURE: 116 MMHG | DIASTOLIC BLOOD PRESSURE: 72 MMHG | BODY MASS INDEX: 30.3 KG/M2

## 2018-11-26 DIAGNOSIS — R68.82 LOW LIBIDO: ICD-10-CM

## 2018-11-26 DIAGNOSIS — Z01.419 ENCOUNTER FOR GYNECOLOGICAL EXAMINATION WITH PAPANICOLAOU SMEAR OF CERVIX: Primary | ICD-10-CM

## 2018-11-26 DIAGNOSIS — Z30.41 ORAL CONTRACEPTIVE PILL SURVEILLANCE: ICD-10-CM

## 2018-11-26 DIAGNOSIS — N76.0 RECURRENT VAGINITIS: ICD-10-CM

## 2018-11-26 PROCEDURE — G0123 SCREEN CERV/VAG THIN LAYER: HCPCS | Performed by: NURSE PRACTITIONER

## 2018-11-26 PROCEDURE — 99395 PREV VISIT EST AGE 18-39: CPT | Performed by: NURSE PRACTITIONER

## 2018-11-26 RX ORDER — BUPROPION HYDROCHLORIDE 150 MG/1
150 TABLET ORAL DAILY
Qty: 30 TABLET | Refills: 11 | Status: SHIPPED | OUTPATIENT
Start: 2018-11-26 | End: 2019-12-06

## 2018-11-26 RX ORDER — NORGESTIMATE AND ETHINYL ESTRADIOL 7DAYSX3 28
1 KIT ORAL DAILY
Qty: 28 TABLET | Refills: 11 | Status: SHIPPED | OUTPATIENT
Start: 2018-11-26 | End: 2019-10-18 | Stop reason: SDUPTHER

## 2018-11-26 RX ORDER — SERTRALINE HYDROCHLORIDE 25 MG/1
25 TABLET, FILM COATED ORAL DAILY
Qty: 30 TABLET | Refills: 11 | Status: SHIPPED | OUTPATIENT
Start: 2018-11-26 | End: 2019-12-06

## 2018-11-26 RX ORDER — METRONIDAZOLE 500 MG/1
500 TABLET ORAL 2 TIMES DAILY
Qty: 14 TABLET | Refills: 0 | Status: SHIPPED | OUTPATIENT
Start: 2018-11-26 | End: 2018-12-03

## 2018-11-26 RX ORDER — FLUCONAZOLE 150 MG/1
150 TABLET ORAL ONCE
Qty: 2 TABLET | Refills: 0 | Status: SHIPPED | OUTPATIENT
Start: 2018-11-26 | End: 2018-11-26

## 2018-11-26 RX ORDER — NITROFURANTOIN 25; 75 MG/1; MG/1
100 CAPSULE ORAL 2 TIMES DAILY
Qty: 10 CAPSULE | Refills: 0 | Status: SHIPPED | OUTPATIENT
Start: 2018-11-26 | End: 2018-12-01

## 2018-11-26 NOTE — PROGRESS NOTES
Subjective   Chief Complaint   Patient presents with   • Gynecologic Exam     well woman annual visit   • Med Refill     OCP and Wellbutrin     Lissa Aragon is a 27 y.o. year old  presenting to be seen for her annual exam.  Today she has concerns about low libido continuing. She was started on Wellbutrin 75mg but saw no change and continues to take Zoloft 50mg. Her anxiety and mood are under control at this time but she is getting  in 6 weeks and doesn't want libido issues to cause strain on her relationship. She has only been on Zoloft for a year now which is when these symptoms really began.  We discussed the sexual side effects of this medication. With such a low dose of Wellbutrin, that typically isn't enough to cancel out the zoloft's effects much less increase libido. I recommend we increase to Wellbutrin XL 150mg and reduce Zoloft to 25mg. Pt is wanting to eventually stop zoloft all together but knows that right before her wedding isn't ideal. She agrees to these dose changes and a couple months after the wedding, she can call to discuss tapering off zoloft if she wishes.     Pt denies any current or recent vaginitis symptoms.     Pt is going out of the country on her honeymoon. Given her frequent hx of UTI and vaginitis, I recommend prophylaxis treatment to take with her on her trip. Diflucan 150mg x 2 doses, Macrobid 100mg BID x 5 days, Flagyl 500mg BID x 7 days. Pt is only to take should she be having symptoms. Pt voices understanding of this.     Patient's last menstrual period was 10/31/2018 (approximate).    OB History      Para Term  AB Living    0 0 0 0 0 0    SAB TAB Ectopic Molar Multiple Live Births    0 0 0 0 0 0          Current birth control method: OCP (estrogen/progesterone). Needs refills.     She is sexually active.  In the past 12 months there has not been new sexual partners.  Condoms are not typically used.  She would not like to be screened for STD's at  "today's exam.     Past 6 month menstrual history:    Cycle Frequency: regular, predictable and consistent every 28 - 32 days   Menstrual cycle character: flow is typically light   Cycle Duration: 3 - 3   Number of heavy days of flows: 0   Dysmenorrhea: none   PMS: none   Intermenstrual bleeding present: no   Post-coital bleeding present: no     She exercises regularly: yes.  She wears her seat belt:yes.  She has concerns about domestic violence: no.  Last colonoscopy: Never  Last DEXA: Never  Last MMG: Never  Last pap: 11/20/2017  History of abnormal PAP: No    The following portions of the patient's history were reviewed and updated as appropriate:problem list, current medications, allergies, past family history, past medical history, past social history and past surgical history.    Social History    Tobacco Use      Smoking status: Never Smoker      Smokeless tobacco: Never Used    Social History     Substance and Sexual Activity   Alcohol Use Yes    Comment: Rarely      Review of Systems   Constitutional: Negative.  Negative for chills and fever.   Respiratory: Negative.    Cardiovascular: Negative.    Gastrointestinal: Negative.    Endocrine: Negative.  Negative for cold intolerance, heat intolerance, polydipsia, polyphagia and polyuria.   Genitourinary: Negative for dyspareunia, dysuria, frequency, genital sores, menstrual problem, pelvic pain, urgency, vaginal discharge and vaginal pain.        Decreased libido   Skin: Negative.    Neurological: Negative for dizziness, syncope, light-headedness and headaches.   Psychiatric/Behavioral: Negative for suicidal ideas. The patient is not nervous/anxious.         Depression well controlled at this time         Objective   /72 (BP Location: Right arm, Patient Position: Sitting, Cuff Size: Adult)   Pulse 84   Resp 16   Ht 160 cm (63\")   Wt 77.6 kg (171 lb)   LMP 10/31/2018 (Approximate)   Breastfeeding? No   BMI 30.29 kg/m²     General:  well developed; " well nourished  no acute distress   Skin:  No suspicious lesions seen   Thyroid: not examined   Breasts:  Examined in supine position  Symmetric without masses or skin dimpling  Nipples normal without inversion, lesions or discharge  There are no palpable axillary nodes   Abdomen: soft, non-tender; no masses  no umbilical or inginual hernias are present  no hepato-splenomegaly  Normal findings: bowel sounds normal   Cardiac: Heart sounds are normal.  Regular rate and rhythm without murmur, gallop or rub.   Resp: Normal expansion.  Clear to auscultation.  No rales, rhonchi, or wheezing.   Psych: alert,oriented, in NAD with a full range of affect, normal behavior and no psychotic features   Pelvis: Uterus:  Clinical staff was present for exam  External genitalia:  normal appearance of the external genitalia including Bartholin's and [place]'s glands. :  urethral meatus normal;  Vaginal:  normal pink mucosa without prolapse or lesions  Cervix:  normal appearance.  Uterus:  normal size, shape and consistency  Adnexa:  normal bimanual exam of the adnexa.     Lab Review   No data reviewed    Imaging  No data reviewed       Lissa was seen today for gynecologic exam and med refill.    Diagnoses and all orders for this visit:    Encounter for gynecological examination with Papanicolaou smear of cervix  -     Liquid-based Pap Smear, Screening  Pap results: I will send card in mail or call if abnormal. RTC annually for well woman exams    Low libido  -     sertraline (ZOLOFT) 25 MG tablet; Take 1 tablet by mouth Daily.  -     buPROPion XL (WELLBUTRIN XL) 150 MG 24 hr tablet; Take 1 tablet by mouth Daily.  Decrease zoloft to 25mg, increase wellbutrin and switch to XL 150mg.. Encouraged pt of nonmedical ways to boost libido and reassured pt that as life settles down from planning a wedding, it may improve on it's own as well. Pt to call after the wedding if she is ready to taper off Zoloft.     Recurrent vaginitis  -      fluconazole (DIFLUCAN) 150 MG tablet; Take 1 tablet by mouth 1 (One) Time for 1 dose. Repeat dose in 72 hours if still symptomatic  -     metroNIDAZOLE (FLAGYL) 500 MG tablet; Take 1 tablet by mouth 2 (Two) Times a Day for 7 days. No alcohol up to 48 hours after last dose  To be taken with her on honeymoon due to being out of the country and having a hx of recurrent vaginitis. No symptoms today.     Oral contraceptive pill surveillance  -     norgestimate-ethinyl estradiol (ORTHO TRI-CYCLEN,TRINESSA) 0.18/0.215/0.25 MG-35 MCG per tablet; Take 1 tablet by mouth Daily.  Continue OCP daily as prescribed. RTC with any concerns.     Other orders  -     nitrofurantoin, macrocrystal-monohydrate, (MACROBID) 100 MG capsule; Take 1 capsule by mouth 2 (Two) Times a Day for 5 days.  No symptoms today. To be taken with her on honeymoon out of the country and if having S/S of UTI.     This note was electronically signed.    Anya Camarena, APRN  November 26, 2018

## 2018-11-27 PROBLEM — F34.1 DYSTHYMIC DISORDER: Status: ACTIVE | Noted: 2017-03-29

## 2018-11-28 LAB
GEN CATEG CVX/VAG CYTO-IMP: NORMAL
LAB AP CASE REPORT: NORMAL
LAB AP GYN ADDITIONAL INFORMATION: NORMAL
PATH INTERP SPEC-IMP: NORMAL
STAT OF ADQ CVX/VAG CYTO-IMP: NORMAL

## 2019-04-19 ENCOUNTER — OFFICE VISIT (OUTPATIENT)
Dept: OTOLARYNGOLOGY | Facility: CLINIC | Age: 29
End: 2019-04-19

## 2019-04-19 VITALS — HEIGHT: 63 IN | WEIGHT: 176 LBS | BODY MASS INDEX: 31.18 KG/M2 | OXYGEN SATURATION: 99 %

## 2019-04-19 DIAGNOSIS — K21.9 LARYNGOPHARYNGEAL REFLUX (LPR): Primary | ICD-10-CM

## 2019-04-19 DIAGNOSIS — R09.89 GLOBUS PHARYNGEUS: ICD-10-CM

## 2019-04-19 DIAGNOSIS — J37.0 CHRONIC LARYNGITIS: ICD-10-CM

## 2019-04-19 PROCEDURE — 99243 OFF/OP CNSLTJ NEW/EST LOW 30: CPT | Performed by: OTOLARYNGOLOGY

## 2019-04-19 PROCEDURE — 31575 DIAGNOSTIC LARYNGOSCOPY: CPT | Performed by: OTOLARYNGOLOGY

## 2019-04-19 RX ORDER — RANITIDINE 300 MG/1
300 TABLET ORAL NIGHTLY
Qty: 30 TABLET | Refills: 11 | Status: SHIPPED | OUTPATIENT
Start: 2019-04-19 | End: 2019-12-06

## 2019-04-22 NOTE — PROGRESS NOTES
"Subjective   Lissa Vargas is a 28 y.o. female.   This is a consultation from Kelsy ALANIS  History of Present Illness   Patient reports that for the last 1.5-2 years she has had symptoms of a feeling of mucus in the back of her nose and throat.  She states it causes her to \"snort\" repeatedly during the day.  She does not have any significant rhinorrhea.  She has been tried on multiple medications including nasal steroids, antihistamines, mucolytic's, and saline lavage none of which seem to help her symptoms.  She states she does not have any overt symptoms of acid reflux, but it is noted that she frequently clears her throat during the examination today she says she does this quite a bit as well.  Denies any hemoptysis.  No dysphagia.  Symptoms are present every day.      The following portions of the patient's history were reviewed and updated as appropriate: allergies, current medications, past family history, past medical history, past social history, past surgical history and problem list.      Lissa Vargas reports that she has never smoked. She has never used smokeless tobacco. She reports that she drinks alcohol. She reports that she does not use drugs.  Patient is not a tobacco user and has not been counseled for use of tobacco products    Family History   Problem Relation Age of Onset   • Cancer Mother    • Hypertension Father    • Stroke Paternal Grandfather        Allergies   Allergen Reactions   • Ibuprofen Swelling     Facial swelling         Current Outpatient Medications:   •  buPROPion XL (WELLBUTRIN XL) 150 MG 24 hr tablet, Take 1 tablet by mouth Daily., Disp: 30 tablet, Rfl: 11  •  cyclobenzaprine (FLEXERIL) 10 MG tablet, Take 10 mg by mouth every night at bedtime., Disp: , Rfl:   •  norgestimate-ethinyl estradiol (ORTHO TRI-CYCLEN,TRINESSA) 0.18/0.215/0.25 MG-35 MCG per tablet, Take 1 tablet by mouth Daily., Disp: 28 tablet, Rfl: 11  •  sertraline (ZOLOFT) 25 MG tablet, Take 1 tablet by " mouth Daily., Disp: 30 tablet, Rfl: 11  •  clindamycin (CLINDAGEL) 1 % gel, Apply  topically Every 12 (Twelve) Hours., Disp: 30 g, Rfl: 1  •  raNITIdine (ZANTAC) 300 MG tablet, Take 1 tablet by mouth Every Night., Disp: 30 tablet, Rfl: 11    Past Medical History:   Diagnosis Date   • Anxiety    • Depression          Review of Systems   Constitutional: Negative for fever.   HENT: Positive for ear pain and postnasal drip.    All other systems reviewed and are negative.          Objective   Physical Exam  General: Well-developed well-nourished female in no acute distress.  Alert and oriented x-3. Head: Normocephalic. Face: Symmetrical strength and appearance. PERRL. EOMI. Voice:Strong. Speech:Fluent  Ears: External ears no deformity, canals no discharge, tympanic membranes intact clear and mobile bilaterally.  Nose: Nares show no discharge mass polyp or purulence.  Boggy mucosa is present.  No gross external deformity.  Septum: Midline  Oral cavity: Lips and gums without lesions.  Tongue and floor of mouth without lesions.  Parotid and submandibular ducts unobstructed.  No mucosal lesions on the buccal mucosa or vestibule of the mouth.  Pharynx: No erythema exudate mass or ulcer.  1+ tonsils present  Neck: No lymphadenopathy.  No thyromegaly.  Trachea and larynx midline.  No masses in the parotid or submandibular glands.    Procedure Note    Pre-operative Diagnosis: Patient presents with:  Sinus Problem      Post-operative Diagnosis: same    Anesthesia: topical with xylocaine and neosynephrine    Endoscopy Type:  Flexible Laryngoscopy    Procedure Details:    The patient was placed in the sitting position.  After topical anesthesia and decongestion, the 4 mm laryngoscope was passed.  The nasal cavities, nasopharynx, oropharynx, hypopharynx, and larynx were all examined.  Vocal cords were examined during respiration and phonation.  The following findings were noted:    Findings: No mass, polyp, or significant discharge  within the nasal cavities bilaterally.. Nasopharynx without mass, hypopharynx and larynx without evidence of neoplasm. Vocal cord mobility intact. There is chronic appearing edema and erythema of the laryngeal structures consistent with chronic laryngitis.  This is particularly notable posteriorly on the arytenoids and interarytenoid mucosa.    Condition:  Stable.  Patient tolerated procedure well.    Complications:  None        Assessment/Plan   Lissa was seen today for sinus problem.    Diagnoses and all orders for this visit:    Laryngopharyngeal reflux (LPR)    Globus pharyngeus    Chronic laryngitis    Other orders  -     raNITIdine (ZANTAC) 300 MG tablet; Take 1 tablet by mouth Every Night.      Plan: Suspect she has globus pharyngeal this due to occult acid reflux.  Prescribed ranitidine 300 mg nightly as well as advising not eating within 2 hours of when she is going to lie down and avoiding caffeine and carbonation as much as possible and especially with the evening meal.  Explained that this would not get better quickly but to return in 2 months at which time I would expect some improvement.    My thanks to Ms. Jade for this consultation

## 2019-06-19 ENCOUNTER — OFFICE VISIT (OUTPATIENT)
Dept: OBSTETRICS AND GYNECOLOGY | Facility: CLINIC | Age: 29
End: 2019-06-19

## 2019-06-19 VITALS
DIASTOLIC BLOOD PRESSURE: 72 MMHG | WEIGHT: 183.6 LBS | HEIGHT: 63 IN | SYSTOLIC BLOOD PRESSURE: 110 MMHG | HEART RATE: 88 BPM | BODY MASS INDEX: 32.53 KG/M2

## 2019-06-19 DIAGNOSIS — Z01.89 ROUTINE LAB DRAW: ICD-10-CM

## 2019-06-19 DIAGNOSIS — Z31.69 ENCOUNTER FOR PRECONCEPTION CONSULTATION: Primary | ICD-10-CM

## 2019-06-19 DIAGNOSIS — E66.9 OBESITY (BMI 30.0-34.9): ICD-10-CM

## 2019-06-19 LAB
ALBUMIN SERPL-MCNC: 4 G/DL (ref 3.5–5)
ALBUMIN/GLOB SERPL: 1 G/DL (ref 1.1–1.8)
ALP SERPL-CCNC: 105 U/L (ref 38–126)
ALT SERPL W P-5'-P-CCNC: 21 U/L
ANION GAP SERPL CALCULATED.3IONS-SCNC: 11 MMOL/L (ref 5–15)
AST SERPL-CCNC: 21 U/L (ref 14–36)
BASOPHILS # BLD AUTO: 0.02 10*3/MM3 (ref 0–0.2)
BASOPHILS NFR BLD AUTO: 0.3 % (ref 0–1.5)
BILIRUB SERPL-MCNC: 0.4 MG/DL (ref 0.2–1.3)
BUN BLD-MCNC: 12 MG/DL (ref 7–23)
BUN/CREAT SERPL: 11.2 (ref 7–25)
CALCIUM SPEC-SCNC: 9.3 MG/DL (ref 8.4–10.2)
CHLORIDE SERPL-SCNC: 103 MMOL/L (ref 101–112)
CO2 SERPL-SCNC: 28 MMOL/L (ref 22–30)
CREAT BLD-MCNC: 1.07 MG/DL (ref 0.52–1.04)
DEPRECATED RDW RBC AUTO: 41.1 FL (ref 37–54)
EOSINOPHIL # BLD AUTO: 0.12 10*3/MM3 (ref 0–0.4)
EOSINOPHIL NFR BLD AUTO: 1.8 % (ref 0.3–6.2)
ERYTHROCYTE [DISTWIDTH] IN BLOOD BY AUTOMATED COUNT: 13.4 % (ref 12.3–15.4)
GFR SERPL CREATININE-BSD FRML MDRD: 62 ML/MIN/1.73 (ref 71–165)
GLOBULIN UR ELPH-MCNC: 4 GM/DL (ref 2.3–3.5)
GLUCOSE BLD-MCNC: 77 MG/DL (ref 70–99)
HCT VFR BLD AUTO: 41.1 % (ref 34–46.6)
HGB BLD-MCNC: 13.8 G/DL (ref 12–15.9)
LYMPHOCYTES # BLD AUTO: 2.41 10*3/MM3 (ref 0.7–3.1)
LYMPHOCYTES NFR BLD AUTO: 35.8 % (ref 19.6–45.3)
MCH RBC QN AUTO: 28.5 PG (ref 26.6–33)
MCHC RBC AUTO-ENTMCNC: 33.6 G/DL (ref 31.5–35.7)
MCV RBC AUTO: 84.9 FL (ref 79–97)
MONOCYTES # BLD AUTO: 0.57 10*3/MM3 (ref 0.1–0.9)
MONOCYTES NFR BLD AUTO: 8.5 % (ref 5–12)
NEUTROPHILS # BLD AUTO: 3.62 10*3/MM3 (ref 1.7–7)
NEUTROPHILS NFR BLD AUTO: 53.6 % (ref 42.7–76)
PLATELET # BLD AUTO: 230 10*3/MM3 (ref 140–450)
PMV BLD AUTO: 9.4 FL (ref 6–12)
POTASSIUM BLD-SCNC: 4.1 MMOL/L (ref 3.4–5)
PROT SERPL-MCNC: 8 G/DL (ref 6.3–8.6)
RBC # BLD AUTO: 4.84 10*6/MM3 (ref 3.77–5.28)
SODIUM BLD-SCNC: 142 MMOL/L (ref 137–145)
TSH SERPL DL<=0.05 MIU/L-ACNC: 2.61 MIU/ML (ref 0.27–4.2)
WBC NRBC COR # BLD: 6.74 10*3/MM3 (ref 3.4–10.8)

## 2019-06-19 PROCEDURE — 85025 COMPLETE CBC W/AUTO DIFF WBC: CPT | Performed by: NURSE PRACTITIONER

## 2019-06-19 PROCEDURE — 36415 COLL VENOUS BLD VENIPUNCTURE: CPT | Performed by: NURSE PRACTITIONER

## 2019-06-19 PROCEDURE — 84443 ASSAY THYROID STIM HORMONE: CPT | Performed by: NURSE PRACTITIONER

## 2019-06-19 PROCEDURE — 99213 OFFICE O/P EST LOW 20 MIN: CPT | Performed by: NURSE PRACTITIONER

## 2019-06-19 PROCEDURE — 80053 COMPREHEN METABOLIC PANEL: CPT | Performed by: NURSE PRACTITIONER

## 2019-06-19 RX ORDER — FLUTICASONE PROPIONATE 50 MCG
SPRAY, SUSPENSION (ML) NASAL
Refills: 0 | COMMUNITY
Start: 2019-05-24 | End: 2019-12-06

## 2019-06-19 NOTE — PROGRESS NOTES
Subjective   Lissa Vargas is a 28 y.o. female.     History of Present Illness   Pt presents to discuss medications and the possibility of trying to conceive in the next year. Pt is currently on Wellbutrin, Zoloft, OCPs. Takes Zantax, flonase and Flexeril PRN. Pt inquires about what medications she should stop besides OCPs when she and her  are ready to try. She is doing well on zoloft and wellbutrin but would like to start by stopping zoloft. She inquires about return to fertility after stopping OCPs. She inquires about weight loss and a possible goal weight.     Patient's last menstrual period was 06/05/2019 (approximate). Periods are regular, 3 days, light, mild cramping. Pt states she has been on OCPs since high school but recalls regular periods prior to starting it then.  Had normal pap 11/26/18.    Pt has not had routine labs drawn. I will order a few today.     The following portions of the patient's history were reviewed and updated as appropriate: allergies, current medications, past family history, past medical history, past social history, past surgical history and problem list.    Review of Systems   Constitutional: Negative.  Negative for chills and fever. Weight gain: slow    Respiratory: Negative.    Cardiovascular: Negative.    Endocrine: Negative for cold intolerance and heat intolerance.   Genitourinary: Negative.  Negative for dyspareunia, menstrual problem, pelvic pain, vaginal discharge and vaginal pain.   Neurological: Negative for dizziness, syncope and headache.   Psychiatric/Behavioral: Negative.  Negative for depressed mood. The patient is not nervous/anxious.         Anxiety and depression well controlled on Zoloft and Wellbutrin       Objective    Vitals:    06/19/19 1106   BP: 110/72   Pulse: 88         06/19/19  1106   Weight: 83.3 kg (183 lb 9.6 oz)     Body mass index is 32.52 kg/m².    Physical Exam   Constitutional: She is oriented to person, place, and time. She appears  well-developed and well-nourished.   Cardiovascular: Normal rate, regular rhythm and normal heart sounds.   Pulmonary/Chest: Effort normal and breath sounds normal.   Neurological: She is alert and oriented to person, place, and time.   Skin: Skin is warm and dry.   Psychiatric: She has a normal mood and affect. Her behavior is normal.   Vitals reviewed.        Assessment/Plan   Lissa was seen today for discuss medication.    Diagnoses and all orders for this visit:    Encounter for preconception consultation    Routine lab draw  -     CBC Auto Differential  -     Comprehensive Metabolic Panel  -     TSH    Obesity (BMI 30.0-34.9)    I spent 20 minutes counseling patient on medication regimen, fertility friendly habits (preseed, tracking cycles on OVIA, laying for up to 45 minutes after intercourse, timing intercourse for fertile window, etc.), and encouraged pt to begin a prenatal vitamin now. Educated pt that OCPs are immediately reversible and patient could get pregnant right away. However, it could take longer. If periods are regular and pt not having any concerns, she was instructed to TTC x 1 year. RTC sooner if having any problems. She voices understanding.     I instructed pt to start by taking zoloft 25mg every other day x 2 weeks, then twice a week for 2 weeks then stop if feeling ok. Once patient is off Zoloft and if she feels that she wants to try to stop Wellbutrin also, she can wean down on it too. However, I discussed with pt that if it's necessary for her mental health to take something, we can absolutely work to assess the best plan for her. She voices understanding to this.      I will call with lab results and discuss next steps PRN. We discussed weight management at length. 1200 shena/day with tracking calories on My[employer]itness Pal tammie. Encouraged exercise at least 30 minutes a day x 4+ days a week. Lean meats, low fat and carb options. Portion control. Pt has a goal weight of 160lbs. We discussed  that there is a connection with weight a fertility and weight loss will be beneficial to her prior to conceiving.

## 2019-07-23 ENCOUNTER — OFFICE VISIT (OUTPATIENT)
Dept: OTOLARYNGOLOGY | Facility: CLINIC | Age: 29
End: 2019-07-23

## 2019-07-23 VITALS — HEIGHT: 63 IN | OXYGEN SATURATION: 99 % | WEIGHT: 183 LBS | BODY MASS INDEX: 32.43 KG/M2

## 2019-07-23 DIAGNOSIS — R09.89 GLOBUS PHARYNGEUS: Primary | ICD-10-CM

## 2019-07-23 DIAGNOSIS — J31.0 CHRONIC RHINITIS: ICD-10-CM

## 2019-07-23 DIAGNOSIS — K21.9 LARYNGOPHARYNGEAL REFLUX (LPR): ICD-10-CM

## 2019-07-23 PROCEDURE — 99213 OFFICE O/P EST LOW 20 MIN: CPT | Performed by: OTOLARYNGOLOGY

## 2019-07-23 RX ORDER — IPRATROPIUM BROMIDE 42 UG/1
2 SPRAY, METERED NASAL 3 TIMES DAILY
Qty: 15 ML | Refills: 11 | Status: SHIPPED | OUTPATIENT
Start: 2019-07-23 | End: 2019-12-06

## 2019-07-25 NOTE — PROGRESS NOTES
Subjective   Lissa Vargas is a 28 y.o. female.       History of Present Illness   Patient was seen previously with a sensation of mucus in the back of her nose and also frequent throat clearing.  She was thought to have globus pharyngeus due to laryngeal pharyngeal reflux.  Was placed on ranitidine.  States that her throat clearing is not as frequent as previously but she still feels like she has mucus in the back of her nose.  Interestingly she says when she is in a drier climate the sensation is not as notable.  She is not having any purulent rhinorrhea.      The following portions of the patient's history were reviewed and updated as appropriate: allergies, current medications, past family history, past medical history, past social history, past surgical history and problem list.     reports that she has never smoked. She has never used smokeless tobacco. She reports that she drinks alcohol. She reports that she does not use drugs.   Patient is not a tobacco user and has not been counseled for use of tobacco products      Review of Systems   Constitutional: Negative for fever.           Objective   Physical Exam  General: Well-developed well-nourished female in no acute distress.  Alert and oriented x-3.Voice:Strong. Speech:Fluent.  Only clears her throat a couple of times during the exam today  Ears: External ears no deformity, canals no discharge, tympanic membranes intact clear and mobile bilaterally.  Nose: Nares show no discharge mass polyp or purulence.  Boggy mucosa is present.  No gross external deformity.  Septum: Midline  Oral cavity: Lips and gums without lesions.  Tongue and floor of mouth without lesions.  Parotid and submandibular ducts unobstructed.  No mucosal lesions on the buccal mucosa or vestibule of the mouth.  Pharynx: No erythema exudate mass or ulcer. 1+ tonsils present  Neck: No lymphadenopathy.  No thyromegaly.  Trachea and larynx midline.  No masses in the parotid or submandibular  glands.      Assessment/Plan   Lissa was seen today for follow-up.    Diagnoses and all orders for this visit:    Globus pharyngeus    Chronic rhinitis    Laryngopharyngeal reflux (LPR)    Other orders  -     ipratropium (ATROVENT) 0.06 % nasal spray; 2 sprays into the nostril(s) as directed by provider 3 (Three) Times a Day.      Plan: Will try Atrovent nose spray 2 sprays each nostril 3 times a day.  Return in a month, call sooner for problems.

## 2019-10-18 DIAGNOSIS — Z30.41 ORAL CONTRACEPTIVE PILL SURVEILLANCE: ICD-10-CM

## 2019-10-22 RX ORDER — NORGESTIMATE AND ETHINYL ESTRADIOL 7DAYSX3 28
KIT ORAL
Qty: 28 TABLET | Refills: 1 | Status: SHIPPED | OUTPATIENT
Start: 2019-10-22 | End: 2019-12-06 | Stop reason: SDUPTHER

## 2019-12-06 ENCOUNTER — OFFICE VISIT (OUTPATIENT)
Dept: OBSTETRICS AND GYNECOLOGY | Facility: CLINIC | Age: 29
End: 2019-12-06

## 2019-12-06 VITALS
HEIGHT: 63 IN | WEIGHT: 188 LBS | DIASTOLIC BLOOD PRESSURE: 76 MMHG | BODY MASS INDEX: 33.31 KG/M2 | SYSTOLIC BLOOD PRESSURE: 116 MMHG | HEART RATE: 83 BPM

## 2019-12-06 DIAGNOSIS — R68.82 LOW LIBIDO: ICD-10-CM

## 2019-12-06 DIAGNOSIS — N60.11 FIBROCYSTIC BREAST CHANGES, BILATERAL: ICD-10-CM

## 2019-12-06 DIAGNOSIS — Z01.419 ENCOUNTER FOR ROUTINE GYNECOLOGICAL EXAMINATION WITH PAPANICOLAOU SMEAR OF CERVIX: Primary | ICD-10-CM

## 2019-12-06 DIAGNOSIS — Z30.41 ORAL CONTRACEPTIVE PILL SURVEILLANCE: ICD-10-CM

## 2019-12-06 DIAGNOSIS — N60.12 FIBROCYSTIC BREAST CHANGES, BILATERAL: ICD-10-CM

## 2019-12-06 PROCEDURE — 84402 ASSAY OF FREE TESTOSTERONE: CPT | Performed by: NURSE PRACTITIONER

## 2019-12-06 PROCEDURE — 99395 PREV VISIT EST AGE 18-39: CPT | Performed by: NURSE PRACTITIONER

## 2019-12-06 PROCEDURE — 36415 COLL VENOUS BLD VENIPUNCTURE: CPT | Performed by: NURSE PRACTITIONER

## 2019-12-06 PROCEDURE — 84403 ASSAY OF TOTAL TESTOSTERONE: CPT | Performed by: NURSE PRACTITIONER

## 2019-12-06 PROCEDURE — G0123 SCREEN CERV/VAG THIN LAYER: HCPCS | Performed by: NURSE PRACTITIONER

## 2019-12-06 RX ORDER — NORGESTIMATE AND ETHINYL ESTRADIOL 7DAYSX3 28
1 KIT ORAL DAILY
Qty: 84 TABLET | Refills: 4 | Status: SHIPPED | OUTPATIENT
Start: 2019-12-06 | End: 2020-02-03

## 2019-12-06 NOTE — PROGRESS NOTES
Subjective   Chief Complaint   Patient presents with   • Gynecologic Exam     well woman annual visit   • Contraception     refills on OCP     Lissa Vargas is a 29 y.o. year old  presenting to be seen for her annual exam.  Today she has concerns about low libido continuing. She was started on Wellbutrin but saw no change and continued to take Zoloft. She has been off Zoloft and Wellbutrin for 2 months. She states is doing well with her moods. Does notice she can cry and little quicker but isn't worried about this. Since stopping Zoloft, she has not had any improvement in libido. She inquires about what to do. We ruled out thyroid concerns in . I recommend testosterone testing. If low, we will do a low dose compound. Pt agrees with this plan.     Patient's last menstrual period was 2019 (exact date).    OB History      Para Term  AB Living    0 0 0 0 0 0    SAB TAB Ectopic Molar Multiple Live Births    0 0 0 0 0 0          Current birth control method: OCP (estrogen/progesterone). Needs refills.     She is sexually active.  In the past 12 months there has not been new sexual partners.  Condoms are not typically used.  She would not like to be screened for STD's at today's exam.     Past 6 month menstrual history:    Cycle Frequency: regular, predictable and consistent every 28 - 32 days   Menstrual cycle character: flow is typically light   Cycle Duration: 3 - 3   Number of heavy days of flows: 0   Dysmenorrhea: none   PMS: none   Intermenstrual bleeding present: no   Post-coital bleeding present: no     She exercises regularly: yes.  She wears her seat belt:yes.  She has concerns about domestic violence: no.  Last colonoscopy: Never  Last DEXA: Never  Last MMG: Never  Last pap: 18  History of abnormal PAP: No    The following portions of the patient's history were reviewed and updated as appropriate:problem list, current medications, allergies, past family history, past  "medical history, past social history and past surgical history.    Social History    Tobacco Use      Smoking status: Never Smoker      Smokeless tobacco: Never Used    Social History     Substance and Sexual Activity   Alcohol Use Yes    Comment: Rarely      Review of Systems   Constitutional: Negative.  Negative for chills and fever.   Respiratory: Negative.    Cardiovascular: Negative.    Gastrointestinal: Negative.    Endocrine: Negative.  Negative for cold intolerance, heat intolerance, polydipsia, polyphagia and polyuria.   Genitourinary: Negative for dyspareunia, dysuria, frequency, genital sores, menstrual problem, pelvic pain, urgency, vaginal discharge and vaginal pain. Vaginal bleeding: currently ending her period.        Decreased libido, noticed a small lump under the right breast that fluctuates in size and tenderness.    Skin: Negative.    Neurological: Negative for dizziness, syncope, light-headedness and headaches.   Psychiatric/Behavioral: Negative for suicidal ideas. The patient is not nervous/anxious.         Depression well controlled at this time without medication         Objective   /76   Pulse 83   Ht 160 cm (63\")   Wt 85.3 kg (188 lb)   LMP 12/03/2019 (Exact Date)   Breastfeeding? No   BMI 33.30 kg/m²     General:  well developed; well nourished  no acute distress   Skin:  No suspicious lesions seen   Thyroid: not examined   Breasts:  Examined in supine position  Symmetric without masses or skin dimpling  Nipples normal without inversion, lesions or discharge  There are no palpable axillary nodes  Fibrocystic changes are present both breasts without a discrete mass   Abdomen: soft, non-tender; no masses  no umbilical or inginual hernias are present  no hepato-splenomegaly  Normal findings: bowel sounds normal   Cardiac: Heart sounds are normal.  Regular rate and rhythm without murmur, gallop or rub.   Resp: Normal expansion.  Clear to auscultation.  No rales, rhonchi, or " wheezing.   Psych: alert,oriented, in NAD with a full range of affect, normal behavior and no psychotic features   Pelvis: Uterus:  Clinical staff was present for exam  External genitalia:  normal appearance of the external genitalia including Bartholin's and [place]'s glands.   :  urethral meatus normal;  Vaginal:  normal pink mucosa without prolapse or lesions, small amount of bleeding  Cervix:  normal appearance.  Uterus:  normal size, shape and consistency  Adnexa:  normal bimanual exam of the adnexa.     Lab Review   No data reviewed    Imaging  No data reviewed       Lissa was seen today for gynecologic exam and contraception.    Diagnoses and all orders for this visit:    Encounter for routine gynecological examination with Papanicolaou smear of cervix  -     Liquid-based Pap Smear, Screening    Oral contraceptive pill surveillance  -     norgestimate-ethinyl estradiol (TRI-SPRINTEC) 0.18/0.215/0.25 MG-35 MCG per tablet; Take 1 tablet by mouth Daily.    Low libido  -     Testosterone, Free, Total    Fibrocystic breast changes, bilateral    Pap results: I will send card in mail or call if abnormal. RTC annually for well woman exams.     Continue OCP for now as pt is not ready to start trying to conceive.     We discussed libido in a female. I recommend we get testosterone level since she had stopped medications that have this as a known cause and thyroid was normal. Pt agrees with this plan. I will call pt with results and discuss next steps PRN based on results.     Reassured pt that no masses are palpated. She has scatter fibrocystic tissue. She admits to drinking a lot of caffeine and agrees to cut back on that. Will try Vit E capsule too. RTC if feeling a specific lump and it is not fluctuating with her menstrual cycle. Pt agrees with this plan of care. She has no family hx of breast cancer.     This note was electronically signed.    Anya Camarena, APRN  December 6, 2019

## 2019-12-09 LAB
TESTOST FREE SERPL-MCNC: 1.6 PG/ML (ref 0–4.2)
TESTOST SERPL-MCNC: 36 NG/DL (ref 8–48)

## 2020-02-03 ENCOUNTER — OFFICE VISIT (OUTPATIENT)
Dept: OBSTETRICS AND GYNECOLOGY | Facility: CLINIC | Age: 30
End: 2020-02-03

## 2020-02-03 VITALS
SYSTOLIC BLOOD PRESSURE: 128 MMHG | BODY MASS INDEX: 33.77 KG/M2 | HEIGHT: 63 IN | HEART RATE: 92 BPM | DIASTOLIC BLOOD PRESSURE: 80 MMHG | WEIGHT: 190.6 LBS

## 2020-02-03 DIAGNOSIS — Z23 FLU VACCINE NEED: ICD-10-CM

## 2020-02-03 DIAGNOSIS — Z3A.01 LESS THAN 8 WEEKS GESTATION OF PREGNANCY: Primary | ICD-10-CM

## 2020-02-03 LAB
ABO GROUP BLD: NORMAL
AMPHET+METHAMPHET UR QL: NEGATIVE
AMPHETAMINES UR QL: NEGATIVE
B-HCG UR QL: POSITIVE
BACTERIA UR QL AUTO: ABNORMAL /HPF
BARBITURATES UR QL SCN: NEGATIVE
BENZODIAZ UR QL SCN: NEGATIVE
BILIRUB UR QL STRIP: NEGATIVE
BLD GP AB SCN SERPL QL: NEGATIVE
BUPRENORPHINE SERPL-MCNC: NEGATIVE NG/ML
CANNABINOIDS SERPL QL: NEGATIVE
CLARITY UR: CLEAR
COCAINE UR QL: NEGATIVE
COLOR UR: YELLOW
GLUCOSE UR STRIP-MCNC: NEGATIVE MG/DL
HGB UR QL STRIP.AUTO: NEGATIVE
HYALINE CASTS UR QL AUTO: ABNORMAL /LPF
INTERNAL NEGATIVE CONTROL: NEGATIVE
INTERNAL POSITIVE CONTROL: POSITIVE
KETONES UR QL STRIP: NEGATIVE
LEUKOCYTE ESTERASE UR QL STRIP.AUTO: NEGATIVE
Lab: ABNORMAL
Lab: NORMAL
METHADONE UR QL SCN: NEGATIVE
NITRITE UR QL STRIP: NEGATIVE
OPIATES UR QL: NEGATIVE
OXYCODONE UR QL SCN: NEGATIVE
PCP UR QL SCN: NEGATIVE
PH UR STRIP.AUTO: 6 [PH] (ref 5.5–8)
PROPOXYPH UR QL: NEGATIVE
PROT UR QL STRIP: NEGATIVE
RBC # UR: ABNORMAL /HPF
REF LAB TEST METHOD: ABNORMAL
RH BLD: NEGATIVE
SP GR UR STRIP: <=1.005 (ref 1–1.03)
SQUAMOUS #/AREA URNS HPF: ABNORMAL /HPF
TRICYCLICS UR QL SCN: NEGATIVE
UROBILINOGEN UR QL STRIP: NORMAL
WBC UR QL AUTO: ABNORMAL /HPF

## 2020-02-03 PROCEDURE — 80081 OBSTETRIC PANEL INC HIV TSTG: CPT | Performed by: NURSE PRACTITIONER

## 2020-02-03 PROCEDURE — 87491 CHLMYD TRACH DNA AMP PROBE: CPT | Performed by: NURSE PRACTITIONER

## 2020-02-03 PROCEDURE — 86901 BLOOD TYPING SEROLOGIC RH(D): CPT | Performed by: NURSE PRACTITIONER

## 2020-02-03 PROCEDURE — 81001 URINALYSIS AUTO W/SCOPE: CPT | Performed by: NURSE PRACTITIONER

## 2020-02-03 PROCEDURE — 90471 IMMUNIZATION ADMIN: CPT | Performed by: NURSE PRACTITIONER

## 2020-02-03 PROCEDURE — 86803 HEPATITIS C AB TEST: CPT | Performed by: NURSE PRACTITIONER

## 2020-02-03 PROCEDURE — 86900 BLOOD TYPING SEROLOGIC ABO: CPT | Performed by: NURSE PRACTITIONER

## 2020-02-03 PROCEDURE — 36415 COLL VENOUS BLD VENIPUNCTURE: CPT | Performed by: NURSE PRACTITIONER

## 2020-02-03 PROCEDURE — 84443 ASSAY THYROID STIM HORMONE: CPT | Performed by: NURSE PRACTITIONER

## 2020-02-03 PROCEDURE — 87086 URINE CULTURE/COLONY COUNT: CPT | Performed by: NURSE PRACTITIONER

## 2020-02-03 PROCEDURE — 81025 URINE PREGNANCY TEST: CPT | Performed by: NURSE PRACTITIONER

## 2020-02-03 PROCEDURE — 87661 TRICHOMONAS VAGINALIS AMPLIF: CPT | Performed by: NURSE PRACTITIONER

## 2020-02-03 PROCEDURE — 80306 DRUG TEST PRSMV INSTRMNT: CPT | Performed by: NURSE PRACTITIONER

## 2020-02-03 PROCEDURE — 86850 RBC ANTIBODY SCREEN: CPT | Performed by: NURSE PRACTITIONER

## 2020-02-03 PROCEDURE — 99213 OFFICE O/P EST LOW 20 MIN: CPT | Performed by: NURSE PRACTITIONER

## 2020-02-03 PROCEDURE — 87591 N.GONORRHOEAE DNA AMP PROB: CPT | Performed by: NURSE PRACTITIONER

## 2020-02-03 PROCEDURE — 90674 CCIIV4 VAC NO PRSV 0.5 ML IM: CPT | Performed by: NURSE PRACTITIONER

## 2020-02-03 RX ORDER — PRENATAL VIT NO.126/IRON/FOLIC 28MG-0.8MG
TABLET ORAL DAILY
COMMUNITY

## 2020-02-03 NOTE — PROGRESS NOTES
"Subjective   Lissa Vargas is a 29 y.o. female.     History of Present Illness   Pt presents for confirmation of pregnancy.  UPT first positive 5 days ago, positive in office.   Patient's last menstrual period was 2019 (exact date). GA 4w6d BOLA 10/6/2020.    She stopped her OCPs in the middle of December and became pregnant the first month. She is on prenatal vitamins.     She has had a couple episodes of nausea but no vomiting. Breasts are tender and she has noticed hot flashes. She denies any vaginal bleeding. Over 1 week ago she noted brown when she wiped only one time. She denies cramping or pelvic pain.     She has not had a flu shot and inquires about getting that today.     Pt does not smoke, drink alcohol or use illicit drugs. She is not on any prescription medications. Pap smear was normal 19.     Denies any family hx of genetic conditions in her or her 's family.     The following portions of the patient's history were reviewed and updated as appropriate: allergies, current medications, past family history, past medical history, past social history, past surgical history and problem list.    Review of Systems   Constitutional: Positive for unexpected weight gain. Negative for chills, fatigue, fever and unexpected weight loss.   Respiratory: Negative.    Cardiovascular: Negative.    Gastrointestinal: Negative.  Negative for constipation, diarrhea and vomiting. Indigestion: mild, a few times.   Endocrine: Heat intolerance: hot flashes since pregnant.   Genitourinary: Negative.  Negative for pelvic pain, vaginal bleeding, vaginal discharge and vaginal pain. Menstrual problem: missed period, positive pregnancy.   Neurological: Negative for dizziness, seizures, syncope and light-headedness.   Psychiatric/Behavioral: Negative for depressed mood. The patient is nervous/anxious (still a little \"shocked\").        Objective    Vitals:    20 1543   BP: 128/80   Pulse: 92         " 02/03/20  1543   Weight: 86.5 kg (190 lb 9.6 oz)     Body mass index is 33.76 kg/m².    Physical Exam   Constitutional: She is oriented to person, place, and time. She appears well-developed and well-nourished.   Cardiovascular: Normal rate, regular rhythm and normal heart sounds.   Pulmonary/Chest: Effort normal and breath sounds normal.   Neurological: She is alert and oriented to person, place, and time.   Skin: Skin is warm and dry. No pallor.   Psychiatric: She has a normal mood and affect. Her behavior is normal.   Vitals reviewed.        Assessment/Plan   Lissa was seen today for routine prenatal visit.    Diagnoses and all orders for this visit:    Less than 8 weeks gestation of pregnancy  -     OB Panel With HIV  -     TSH  -     Urinalysis With Microscopic - Urine, Clean Catch  -     Urine Culture - Urine, Urine, Clean Catch  -     Urine Drug Screen - Urine, Clean Catch  -     Chlamydia trachomatis, Neisseria gonorrhoeae, Trichomonas vaginalis, PCR - Urine, Urine, Clean Catch  -     Cancel: Hepatitis C Antibody  -     POC Pregnancy, Urine  -     RPR  -     CBC Auto Differential  -     Hepatitis B Surface Antigen  -     Rubella Antibody, IgG  -     OB Panel Type & Screen  -     HIV-1 / O / 2 Ag / Antibody 4th Generation  -     Hepatitis C Antibody  -     Hep B Confirmation Tube  -     Urinalysis without microscopic (no culture) - Urine, Clean Catch  -     Urinalysis, Microscopic Only - Urine, Clean Catch  -     PREVIOUS HISTORY; Future  -     PREVIOUS HISTORY    Flu vaccine need  -     Flucelvax Quad=>4Years (PFS)      New OB bag and education provided. Counseled pt on diet, weight, BP, genetic screenings, exercise in pregnancy. OTC medications in pregnancy.     Obtain the labs listed above and I will call with results. She does not know who she wants to see for prenatal care. She will talk to her friends and family and let me know when we call her with lab results. I will place referral then.     Flu  vaccine administered in office.     All questions answered. No concerns at this time. She has not told her  yet but has an exciting reveal planned for him involving their pets.

## 2020-02-04 LAB
BASOPHILS # BLD AUTO: 0.05 10*3/MM3 (ref 0–0.2)
BASOPHILS NFR BLD AUTO: 0.5 % (ref 0–1.5)
C TRACH RRNA CVX QL NAA+PROBE: NEGATIVE
DEPRECATED RDW RBC AUTO: 39.6 FL (ref 37–54)
EOSINOPHIL # BLD AUTO: 0.19 10*3/MM3 (ref 0–0.4)
EOSINOPHIL NFR BLD AUTO: 1.8 % (ref 0.3–6.2)
ERYTHROCYTE [DISTWIDTH] IN BLOOD BY AUTOMATED COUNT: 13 % (ref 12.3–15.4)
HBV SURFACE AG SERPL QL IA: NORMAL
HCT VFR BLD AUTO: 39.6 % (ref 34–46.6)
HCV AB SER DONR QL: NORMAL
HGB BLD-MCNC: 13.2 G/DL (ref 12–15.9)
HIV1+2 AB SER QL: NORMAL
HOLD SPECIMEN: NORMAL
IMM GRANULOCYTES # BLD AUTO: 0.01 10*3/MM3 (ref 0–0.05)
IMM GRANULOCYTES NFR BLD AUTO: 0.1 % (ref 0–0.5)
LYMPHOCYTES # BLD AUTO: 3.46 10*3/MM3 (ref 0.7–3.1)
LYMPHOCYTES NFR BLD AUTO: 33 % (ref 19.6–45.3)
MCH RBC QN AUTO: 27.6 PG (ref 26.6–33)
MCHC RBC AUTO-ENTMCNC: 33.3 G/DL (ref 31.5–35.7)
MCV RBC AUTO: 82.8 FL (ref 79–97)
MONOCYTES # BLD AUTO: 0.79 10*3/MM3 (ref 0.1–0.9)
MONOCYTES NFR BLD AUTO: 7.5 % (ref 5–12)
N GONORRHOEA RRNA SPEC QL NAA+PROBE: NEGATIVE
NEUTROPHILS # BLD AUTO: 5.97 10*3/MM3 (ref 1.7–7)
NEUTROPHILS NFR BLD AUTO: 57.1 % (ref 42.7–76)
NRBC BLD AUTO-RTO: 0.1 /100 WBC (ref 0–0.2)
PLATELET # BLD AUTO: 291 10*3/MM3 (ref 140–450)
PMV BLD AUTO: 10.7 FL (ref 6–12)
RBC # BLD AUTO: 4.78 10*6/MM3 (ref 3.77–5.28)
RPR SER QL: NORMAL
TRICHOMONAS VAGINALIS PCR: NEGATIVE
TSH SERPL DL<=0.05 MIU/L-ACNC: 2.93 UIU/ML (ref 0.27–4.2)
WBC NRBC COR # BLD: 10.47 10*3/MM3 (ref 3.4–10.8)

## 2020-02-05 DIAGNOSIS — Z3A.01 LESS THAN 8 WEEKS GESTATION OF PREGNANCY: Primary | ICD-10-CM

## 2020-02-05 LAB
BACTERIA SPEC AEROBE CULT: NO GROWTH
RUBV IGG SERPL IA-ACNC: POSITIVE

## 2020-02-27 ENCOUNTER — INITIAL PRENATAL (OUTPATIENT)
Dept: OBSTETRICS AND GYNECOLOGY | Facility: CLINIC | Age: 30
End: 2020-02-27

## 2020-02-27 ENCOUNTER — APPOINTMENT (OUTPATIENT)
Dept: LAB | Facility: HOSPITAL | Age: 30
End: 2020-02-27

## 2020-02-27 VITALS — DIASTOLIC BLOOD PRESSURE: 60 MMHG | WEIGHT: 186 LBS | SYSTOLIC BLOOD PRESSURE: 116 MMHG | BODY MASS INDEX: 32.95 KG/M2

## 2020-02-27 DIAGNOSIS — Z34.01 ENCOUNTER FOR SUPERVISION OF NORMAL FIRST PREGNANCY IN FIRST TRIMESTER: Primary | ICD-10-CM

## 2020-02-27 DIAGNOSIS — Z86.59 HISTORY OF ANXIETY: ICD-10-CM

## 2020-02-27 DIAGNOSIS — O99.211 MATERNAL OBESITY SYNDROME IN FIRST TRIMESTER: ICD-10-CM

## 2020-02-27 DIAGNOSIS — Z86.59 HISTORY OF DEPRESSION: ICD-10-CM

## 2020-02-27 DIAGNOSIS — Z36.9 ENCOUNTER FOR ANTENATAL SCREENING: ICD-10-CM

## 2020-02-27 DIAGNOSIS — Z36.87 ENCOUNTER FOR ANTENATAL SCREENING FOR UNCERTAIN DATES: ICD-10-CM

## 2020-02-27 PROBLEM — R30.0 BURNING WITH URINATION: Status: RESOLVED | Noted: 2018-09-20 | Resolved: 2020-02-27

## 2020-02-27 PROBLEM — N89.8 VAGINAL ITCHING: Status: RESOLVED | Noted: 2018-09-20 | Resolved: 2020-02-27

## 2020-02-27 PROCEDURE — 0501F PRENATAL FLOW SHEET: CPT | Performed by: NURSE PRACTITIONER

## 2020-03-10 ENCOUNTER — LAB (OUTPATIENT)
Dept: LAB | Facility: HOSPITAL | Age: 30
End: 2020-03-10

## 2020-03-10 DIAGNOSIS — Z36.9 ENCOUNTER FOR ANTENATAL SCREENING: ICD-10-CM

## 2020-03-10 DIAGNOSIS — O99.211 MATERNAL OBESITY SYNDROME IN FIRST TRIMESTER: ICD-10-CM

## 2020-03-10 LAB — GLUCOSE 1H P 100 G GLC PO SERPL-MCNC: 97 MG/DL (ref 60–140)

## 2020-03-10 PROCEDURE — 36415 COLL VENOUS BLD VENIPUNCTURE: CPT

## 2020-03-10 PROCEDURE — 82950 GLUCOSE TEST: CPT

## 2020-03-12 ENCOUNTER — TELEPHONE (OUTPATIENT)
Dept: OBSTETRICS AND GYNECOLOGY | Facility: CLINIC | Age: 30
End: 2020-03-12

## 2020-03-16 ENCOUNTER — TELEPHONE (OUTPATIENT)
Dept: OBSTETRICS AND GYNECOLOGY | Facility: CLINIC | Age: 30
End: 2020-03-16

## 2020-03-16 DIAGNOSIS — R12 HEARTBURN IN PREGNANCY IN FIRST TRIMESTER: Primary | ICD-10-CM

## 2020-03-16 DIAGNOSIS — O26.891 HEARTBURN IN PREGNANCY IN FIRST TRIMESTER: Primary | ICD-10-CM

## 2020-03-16 RX ORDER — OMEPRAZOLE 20 MG/1
20 CAPSULE, DELAYED RELEASE ORAL DAILY
Qty: 30 CAPSULE | Refills: 7 | Status: ON HOLD | OUTPATIENT
Start: 2020-03-16 | End: 2020-10-08

## 2020-03-16 NOTE — TELEPHONE ENCOUNTER
Pt called back and reports she was seen in [facility] ED yesterday for chest pain.  She reports chest pain lasted around 4 hours when she decided to go to ED.  No shortness of breath, sweating, or feeling unwell.  Chest pain resolved after they gave her a dose of aspirin.  They evaluated pt and did labs and told her she had heartburn.  Will send in famotidine for pt, she may also take TUMs prn.

## 2020-03-16 NOTE — TELEPHONE ENCOUNTER
"Please return pt's call.    She went to [facility] ER this weekend.    She would like to \"update\" Anusha.    Thanks   "

## 2020-03-24 DIAGNOSIS — Z36.87 ENCOUNTER FOR ANTENATAL SCREENING FOR UNCERTAIN DATES: ICD-10-CM

## 2020-03-26 ENCOUNTER — ROUTINE PRENATAL (OUTPATIENT)
Dept: OBSTETRICS AND GYNECOLOGY | Facility: CLINIC | Age: 30
End: 2020-03-26

## 2020-03-26 VITALS — BODY MASS INDEX: 32.59 KG/M2 | DIASTOLIC BLOOD PRESSURE: 76 MMHG | WEIGHT: 184 LBS | SYSTOLIC BLOOD PRESSURE: 122 MMHG

## 2020-03-26 DIAGNOSIS — O99.211 OBESITY AFFECTING PREGNANCY IN FIRST TRIMESTER: ICD-10-CM

## 2020-03-26 DIAGNOSIS — Z86.59 HISTORY OF DEPRESSION: ICD-10-CM

## 2020-03-26 DIAGNOSIS — Z3A.12 12 WEEKS GESTATION OF PREGNANCY: ICD-10-CM

## 2020-03-26 DIAGNOSIS — Z86.59 HISTORY OF ANXIETY: ICD-10-CM

## 2020-03-26 DIAGNOSIS — Z34.01 ENCOUNTER FOR SUPERVISION OF NORMAL FIRST PREGNANCY IN FIRST TRIMESTER: Primary | ICD-10-CM

## 2020-03-26 PROCEDURE — 0502F SUBSEQUENT PRENATAL CARE: CPT | Performed by: OBSTETRICS & GYNECOLOGY

## 2020-03-26 NOTE — PROGRESS NOTES
CC: Prenatal visit    Lissa Vargas is a 29 y.o.  at 12w2d.  Doing well.  No complaints.  Has some nausea, occ'l vomiting.  Denies contractions, LOF, or VB.    /76   Wt 83.5 kg (184 lb)   LMP 2019 (Exact Date)   BMI 32.59 kg/m²   Fetal Heart Rate: 150     Problems (from 20 to present)     Problem Noted Resolved    Encounter for supervision of normal first pregnancy in first trimester 3/26/2020 by Vida Post MD No    Overview Signed 3/26/2020  9:02 AM by Vida Post MD     O neg / Rubella immune / GBS unk  Dating: LMP = 1TUS (10wk)  Genetics: Declined  Tdap: 28wk  Flu: Declined  Anatomy: 20wk  1h Glucola: 28wk  H&H/Plts: 28wk  Lab Results   Component Value Date    HGB 13.2 2020    HCT 39.6 2020     2020   Breast/BC undecided         History of anxiety 2020 by Anusha Linares APRN No    Overview Signed 3/26/2020  8:59 AM by Vida Post MD     Was on Wellbutrin XL         History of depression 2020 by Anusha Linares APRN No    Overview Signed 3/26/2020  8:58 AM by Vida Post MD     Was on Zoloft         Obesity affecting pregnancy in first trimester 2020 by Anusha Linares APRN No    Overview Signed 3/26/2020  8:58 AM by Vida Post MD     Class I obesity, PG BMI 33  Early glucola WNL             A/P: Lissa Vargas is a 29 y.o.  at 12w2d.  - RTC in 4 weeks  - Advised manisha  - Reviewed visitation policy  - Reviewed COVID-19 precautions     Diagnosis Plan   1. Encounter for supervision of normal first pregnancy in first trimester     2. Obesity affecting pregnancy in first trimester     3. History of anxiety     4. History of depression     5. 12 weeks gestation of pregnancy       Vida Post MD  3/26/2020  09:10

## 2020-04-23 ENCOUNTER — ROUTINE PRENATAL (OUTPATIENT)
Dept: OBSTETRICS AND GYNECOLOGY | Facility: CLINIC | Age: 30
End: 2020-04-23

## 2020-04-23 VITALS — WEIGHT: 183 LBS | BODY MASS INDEX: 32.42 KG/M2 | SYSTOLIC BLOOD PRESSURE: 114 MMHG | DIASTOLIC BLOOD PRESSURE: 68 MMHG

## 2020-04-23 DIAGNOSIS — Z34.02 ENCOUNTER FOR SUPERVISION OF NORMAL FIRST PREGNANCY IN SECOND TRIMESTER: ICD-10-CM

## 2020-04-23 DIAGNOSIS — O26.892 PREGNANCY RELATED HIP PAIN IN SECOND TRIMESTER, ANTEPARTUM: ICD-10-CM

## 2020-04-23 DIAGNOSIS — Z86.59 HISTORY OF DEPRESSION: ICD-10-CM

## 2020-04-23 DIAGNOSIS — O99.212 OBESITY AFFECTING PREGNANCY IN SECOND TRIMESTER: ICD-10-CM

## 2020-04-23 DIAGNOSIS — Z86.59 HISTORY OF ANXIETY: ICD-10-CM

## 2020-04-23 DIAGNOSIS — Z36.89 ENCOUNTER FOR FETAL ANATOMIC SURVEY: ICD-10-CM

## 2020-04-23 DIAGNOSIS — M25.559 PREGNANCY RELATED HIP PAIN IN SECOND TRIMESTER, ANTEPARTUM: ICD-10-CM

## 2020-04-23 DIAGNOSIS — Z3A.16 16 WEEKS GESTATION OF PREGNANCY: Primary | ICD-10-CM

## 2020-04-23 PROCEDURE — 0502F SUBSEQUENT PRENATAL CARE: CPT | Performed by: NURSE PRACTITIONER

## 2020-04-23 NOTE — PROGRESS NOTES
CC: Prenatal visit    Lissa Vargas is a 29 y.o.  at 16w2d.  Doing well.  No complaints.  Denies contractions, LOF, or VB.     /68   Wt 83 kg (183 lb)   LMP 2019 (Exact Date)   BMI 32.42 kg/m²              Problems (from 20 to present)     Problem Noted Resolved    Encounter for supervision of normal first pregnancy in second trimester 3/26/2020 by Vida Post MD No    Overview Signed 3/26/2020  9:02 AM by Vida Post MD     O neg / Rubella immune / GBS unk  Dating: LMP = 1TUS (10wk)  Genetics: Declined  Tdap: 28wk  Flu: Declined  Anatomy: 20wk  1h Glucola: 28wk  H&H/Plts: 28wk  Lab Results   Component Value Date    HGB 13.2 2020    HCT 39.6 2020     2020   Breast/BC undecided         History of anxiety 2020 by Anusha Linares APRN No    Overview Signed 3/26/2020  8:59 AM by Vida Post MD     Was on Wellbutrin XL         History of depression 2020 by Anusha Linares APRN No    Overview Signed 3/26/2020  8:58 AM by Vida Post MD     Was on Zoloft         Obesity affecting pregnancy in second trimester 2020 by Anusha Linares APRN No    Overview Signed 3/26/2020  8:58 AM by Vida Post MD     Class I obesity, PG BMI 33  Early glucola WNL               A/P: Lissa Vargas is a 29 y.o.  at 16w2d.  Recommended rest and ice for hip pain, may also try sitting on exercise ball when sitting for prolonged periods of time.  Pt declines PT referral today.  - RTC in 4 weeks for MARLENI appt and anatomy scan or sooner if needed      Diagnosis Plan   1. 16 weeks gestation of pregnancy     2. Encounter for supervision of normal first pregnancy in second trimester     3. Obesity affecting pregnancy in second trimester     4. History of anxiety     5. History of depression     6. Encounter for fetal anatomic survey  US Ob 14 +  Weeks Single or First Gestation   7.      Pregnancy related hip pain in second trimester    ANNABELLE Tyson  4/23/2020  15:12

## 2020-05-20 PROBLEM — O09.92 SUPERVISION OF HIGH RISK PREGNANCY IN SECOND TRIMESTER: Status: ACTIVE | Noted: 2020-03-26

## 2020-05-21 ENCOUNTER — ROUTINE PRENATAL (OUTPATIENT)
Dept: OBSTETRICS AND GYNECOLOGY | Facility: CLINIC | Age: 30
End: 2020-05-21

## 2020-05-21 VITALS — SYSTOLIC BLOOD PRESSURE: 118 MMHG | BODY MASS INDEX: 32.95 KG/M2 | WEIGHT: 186 LBS | DIASTOLIC BLOOD PRESSURE: 62 MMHG

## 2020-05-21 DIAGNOSIS — Z3A.20 20 WEEKS GESTATION OF PREGNANCY: ICD-10-CM

## 2020-05-21 DIAGNOSIS — IMO0002 EVALUATE ANATOMY NOT SEEN ON PRIOR SONOGRAM: ICD-10-CM

## 2020-05-21 DIAGNOSIS — M25.559 PREGNANCY RELATED HIP PAIN IN SECOND TRIMESTER, ANTEPARTUM: ICD-10-CM

## 2020-05-21 DIAGNOSIS — Z86.59 HISTORY OF DEPRESSION: ICD-10-CM

## 2020-05-21 DIAGNOSIS — O26.892 PREGNANCY RELATED HIP PAIN IN SECOND TRIMESTER, ANTEPARTUM: ICD-10-CM

## 2020-05-21 DIAGNOSIS — O09.92 SUPERVISION OF HIGH RISK PREGNANCY IN SECOND TRIMESTER: Primary | ICD-10-CM

## 2020-05-21 DIAGNOSIS — O99.212 OBESITY AFFECTING PREGNANCY IN SECOND TRIMESTER: ICD-10-CM

## 2020-05-21 DIAGNOSIS — Z86.59 HISTORY OF ANXIETY: ICD-10-CM

## 2020-05-21 PROCEDURE — 0502F SUBSEQUENT PRENATAL CARE: CPT | Performed by: OBSTETRICS & GYNECOLOGY

## 2020-05-27 DIAGNOSIS — Z36.89 ENCOUNTER FOR FETAL ANATOMIC SURVEY: ICD-10-CM

## 2020-06-18 ENCOUNTER — TELEPHONE (OUTPATIENT)
Dept: OBSTETRICS AND GYNECOLOGY | Facility: CLINIC | Age: 30
End: 2020-06-18

## 2020-06-18 ENCOUNTER — ROUTINE PRENATAL (OUTPATIENT)
Dept: OBSTETRICS AND GYNECOLOGY | Facility: CLINIC | Age: 30
End: 2020-06-18

## 2020-06-18 VITALS — DIASTOLIC BLOOD PRESSURE: 70 MMHG | BODY MASS INDEX: 32.95 KG/M2 | WEIGHT: 186 LBS | SYSTOLIC BLOOD PRESSURE: 110 MMHG

## 2020-06-18 DIAGNOSIS — O99.212 OBESITY AFFECTING PREGNANCY IN SECOND TRIMESTER: ICD-10-CM

## 2020-06-18 DIAGNOSIS — O26.892 PREGNANCY RELATED HIP PAIN IN SECOND TRIMESTER, ANTEPARTUM: ICD-10-CM

## 2020-06-18 DIAGNOSIS — Z36.9 ENCOUNTER FOR ANTENATAL SCREENING: ICD-10-CM

## 2020-06-18 DIAGNOSIS — M25.559 PREGNANCY RELATED HIP PAIN IN SECOND TRIMESTER, ANTEPARTUM: ICD-10-CM

## 2020-06-18 DIAGNOSIS — Z3A.24 24 WEEKS GESTATION OF PREGNANCY: Primary | ICD-10-CM

## 2020-06-18 DIAGNOSIS — Z86.59 HISTORY OF ANXIETY: ICD-10-CM

## 2020-06-18 DIAGNOSIS — Z86.59 HISTORY OF DEPRESSION: ICD-10-CM

## 2020-06-18 DIAGNOSIS — O09.92 SUPERVISION OF HIGH RISK PREGNANCY IN SECOND TRIMESTER: ICD-10-CM

## 2020-06-18 PROCEDURE — 0502F SUBSEQUENT PRENATAL CARE: CPT | Performed by: NURSE PRACTITIONER

## 2020-06-18 NOTE — TELEPHONE ENCOUNTER
The patient had told the provider today at her prenatal appointment that she would like to get an appointment for 3rd trimester teaching to discuss labor and tour L&D. I tried to call the patient to set up that appointment and to discuss Centering Pregnancy with her to see if she is interested. The patient did not answer. I left a message for her to return my call.

## 2020-06-18 NOTE — PROGRESS NOTES
CC: Prenatal visit    Lissa Vargas is a 29 y.o.  at 24w2d.  Doing well.  No complaints.  She desires L/D tour and education. Denies contractions, LOF, or VB.  Reports +FM.    /70   Wt 84.4 kg (186 lb)   LMP 2019 (Exact Date)   BMI 32.95 kg/m²     US prelim- , fetus vertex presentation, placenta anterior, SUZANNA 16.07 cm,  gms, previous sub optimal views obtained of profile, CL 3.7cm           Problems (from 20 to present)     Problem Noted Resolved    Pregnancy related hip pain in second trimester, antepartum 2020 by Anusha Linares APRN No    Supervision of high risk pregnancy in second trimester 3/26/2020 by Vida Post MD No    Overview Signed 3/26/2020  9:02 AM by Vida Post MD     O neg / Rubella immune / GBS unk  Dating: LMP = 1TUS (10wk)  Genetics: Declined  Tdap: 28wk  Flu: Declined  Anatomy: 20wk  1h Glucola: 28wk  H&H/Plts: 28wk  Lab Results   Component Value Date    HGB 13.2 2020    HCT 39.6 2020     2020   Breast/BC undecided         History of anxiety 2020 by Anusha Linares APRN No    Overview Signed 3/26/2020  8:59 AM by Vida Post MD     Was on Wellbutrin XL         History of depression 2020 by Anusha Linares APRN No    Overview Signed 3/26/2020  8:58 AM by Vida Post MD     Was on Zoloft         Obesity affecting pregnancy in second trimester 2020 by Anusha Linares APRN No    Overview Signed 3/26/2020  8:58 AM by Vida Post MD     Class I obesity, PG BMI 33  Early glucola WNL               A/P: Lissa Vargas is a 29 y.o.  at 24w2d.  Maria C will call pt to schedule l/d tour and education session  - RTC in 4 weeks for MARLENI appt and labs     Diagnosis Plan   1. 24 weeks gestation of pregnancy     2. Supervision of high risk pregnancy in second trimester     3.  Obesity affecting pregnancy in second trimester     4. Pregnancy related hip pain in second trimester, antepartum     5. History of anxiety     6. History of depression     7. Encounter for  screening  Glucose, Post 50 Gm Glucola    CBC (No Diff)       ANNABELLE Tyson  2020  15:44

## 2020-06-19 ENCOUNTER — TELEPHONE (OUTPATIENT)
Dept: OBSTETRICS AND GYNECOLOGY | Facility: CLINIC | Age: 30
End: 2020-06-19

## 2020-06-22 DIAGNOSIS — IMO0002 EVALUATE ANATOMY NOT SEEN ON PRIOR SONOGRAM: ICD-10-CM

## 2020-06-25 ENCOUNTER — TELEPHONE (OUTPATIENT)
Dept: OBSTETRICS AND GYNECOLOGY | Facility: CLINIC | Age: 30
End: 2020-06-25

## 2020-06-25 NOTE — TELEPHONE ENCOUNTER
I called the patient again to set up a time for her to have 3rd trimester teaching and tour L&D. I wanted to discuss Centering Pregnancy with her to see if she was interested since she will get a lot of education with her appointments. I left a message for her to return my call.

## 2020-07-07 DIAGNOSIS — M25.559 PREGNANCY RELATED HIP PAIN IN SECOND TRIMESTER, ANTEPARTUM: Primary | ICD-10-CM

## 2020-07-07 DIAGNOSIS — O26.892 PREGNANCY RELATED HIP PAIN IN SECOND TRIMESTER, ANTEPARTUM: Primary | ICD-10-CM

## 2020-07-14 DIAGNOSIS — Z67.91 RH NEGATIVE STATUS DURING PREGNANCY IN THIRD TRIMESTER: Primary | ICD-10-CM

## 2020-07-14 DIAGNOSIS — O26.893 RH NEGATIVE STATUS DURING PREGNANCY IN THIRD TRIMESTER: Primary | ICD-10-CM

## 2020-07-15 ENCOUNTER — ROUTINE PRENATAL (OUTPATIENT)
Dept: OBSTETRICS AND GYNECOLOGY | Facility: CLINIC | Age: 30
End: 2020-07-15

## 2020-07-15 ENCOUNTER — LAB (OUTPATIENT)
Dept: LAB | Facility: HOSPITAL | Age: 30
End: 2020-07-15

## 2020-07-15 VITALS — SYSTOLIC BLOOD PRESSURE: 114 MMHG | DIASTOLIC BLOOD PRESSURE: 62 MMHG | BODY MASS INDEX: 33.48 KG/M2 | WEIGHT: 189 LBS

## 2020-07-15 DIAGNOSIS — Z67.91 RH NEGATIVE STATUS DURING PREGNANCY IN THIRD TRIMESTER: ICD-10-CM

## 2020-07-15 DIAGNOSIS — O26.893 RH NEGATIVE STATUS DURING PREGNANCY IN THIRD TRIMESTER: ICD-10-CM

## 2020-07-15 DIAGNOSIS — O99.213 OBESITY AFFECTING PREGNANCY IN THIRD TRIMESTER: ICD-10-CM

## 2020-07-15 DIAGNOSIS — Z36.9 ENCOUNTER FOR ANTENATAL SCREENING: ICD-10-CM

## 2020-07-15 DIAGNOSIS — Z3A.28 28 WEEKS GESTATION OF PREGNANCY: Primary | ICD-10-CM

## 2020-07-15 DIAGNOSIS — O09.93 SUPERVISION OF HIGH RISK PREGNANCY IN THIRD TRIMESTER: ICD-10-CM

## 2020-07-15 LAB
BLD GP AB SCN SERPL QL: NEGATIVE
DEPRECATED RDW RBC AUTO: 40 FL (ref 37–54)
ERYTHROCYTE [DISTWIDTH] IN BLOOD BY AUTOMATED COUNT: 13.2 % (ref 12.3–15.4)
GLUCOSE 1H P 100 G GLC PO SERPL-MCNC: 82 MG/DL (ref 60–140)
HCT VFR BLD AUTO: 36.7 % (ref 34–46.6)
HGB BLD-MCNC: 12.8 G/DL (ref 12–15.9)
MCH RBC QN AUTO: 29.2 PG (ref 26.6–33)
MCHC RBC AUTO-ENTMCNC: 34.9 G/DL (ref 31.5–35.7)
MCV RBC AUTO: 83.6 FL (ref 79–97)
PLATELET # BLD AUTO: 252 10*3/MM3 (ref 140–450)
PMV BLD AUTO: 10.6 FL (ref 6–12)
RBC # BLD AUTO: 4.39 10*6/MM3 (ref 3.77–5.28)
WBC # BLD AUTO: 13.73 10*3/MM3 (ref 3.4–10.8)

## 2020-07-15 PROCEDURE — 90471 IMMUNIZATION ADMIN: CPT | Performed by: NURSE PRACTITIONER

## 2020-07-15 PROCEDURE — 82950 GLUCOSE TEST: CPT

## 2020-07-15 PROCEDURE — 90715 TDAP VACCINE 7 YRS/> IM: CPT | Performed by: NURSE PRACTITIONER

## 2020-07-15 PROCEDURE — 85027 COMPLETE CBC AUTOMATED: CPT

## 2020-07-15 PROCEDURE — 36415 COLL VENOUS BLD VENIPUNCTURE: CPT

## 2020-07-15 PROCEDURE — 0502F SUBSEQUENT PRENATAL CARE: CPT | Performed by: NURSE PRACTITIONER

## 2020-07-15 PROCEDURE — 86850 RBC ANTIBODY SCREEN: CPT

## 2020-07-15 PROCEDURE — 96372 THER/PROPH/DIAG INJ SC/IM: CPT | Performed by: NURSE PRACTITIONER
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
## 2020-07-23 ENCOUNTER — HOSPITAL ENCOUNTER (OUTPATIENT)
Dept: PHYSICAL THERAPY | Facility: HOSPITAL | Age: 30
Setting detail: THERAPIES SERIES
Discharge: HOME | End: 2020-07-23

## 2020-07-23 DIAGNOSIS — M25.559 PREGNANCY RELATED HIP PAIN IN THIRD TRIMESTER, ANTEPARTUM: Primary | ICD-10-CM

## 2020-07-23 DIAGNOSIS — O26.893 PREGNANCY RELATED HIP PAIN IN THIRD TRIMESTER, ANTEPARTUM: Primary | ICD-10-CM

## 2020-07-23 PROCEDURE — 97162 PT EVAL MOD COMPLEX 30 MIN: CPT | Performed by: PHYSICAL THERAPIST

## 2020-07-23 PROCEDURE — 97110 THERAPEUTIC EXERCISES: CPT | Performed by: PHYSICAL THERAPIST

## 2020-07-23 NOTE — THERAPY EVALUATION
Outpatient Physical Therapy Pelvic Health Initial Evaluation  [facility]     Patient Name: Lissa Vargas  : 1990  MRN: [id number]  Today's Date: 2020        Visit Date: 2020   Visit number:   Insurance: [employer]  Recheck: 20      Patient Active Problem List   Diagnosis   • Dysthymic disorder   • History of anxiety   • History of depression   • Obesity affecting pregnancy in third trimester   • Supervision of high risk pregnancy in third trimester   • Pregnancy related hip pain in second trimester, antepartum   • Rh negative status during pregnancy in third trimester        Past Medical History:   Diagnosis Date   • Anxiety    • Depression    • Hip pain         Past Surgical History:   Procedure Laterality Date   • EAR TUBES      X 3 as a child   • WISDOM TOOTH EXTRACTION       Current Outpatient Medications on File Prior to Encounter   Medication Sig Dispense Refill   • omeprazole (PrilOSEC) 20 MG capsule Take 1 capsule by mouth Daily. 30 capsule 7   • Prenatal Vit-Fe Fumarate-FA (PRENATAL, CLASSIC, VITAMIN) 28-0.8 MG tablet tablet Take  by mouth Daily.       No current facility-administered medications on file prior to encounter.        Visit Dx:    ICD-10-CM ICD-9-CM   1. Pregnancy related hip pain in third trimester, antepartum O26.893 646.83    M25.559 719.45           Pelvic Health     Row Name 20 0800             Pregnancy Questions    Number of Pregnancies  1  -SW      Number of Miscarriages  0  -SW      How many weeks pregnant are you?  29 weeks  -SW      Due Date  10/06/20  -SW         Pain Assessment    Pain Assessment  0-10 worse 8-9/10  -SW      Pain Score  5  -SW      Post Pain Score  5  -SW      Pain Location  Hip  -SW      Pain Orientation  Left both intermittently.  -SW        User Key  (r) = Recorded By, (t) = Taken By, (c) = Cosigned By    Initials Name Provider Type    Aarti Marino, PT DPT Physical Therapist        PT Ortho     Row Name  07/23/20 0800       Subjective Comments    Subjective Comments  Hip pain noted with initially at 10 weeks.  Hard to find a medium of enough walking.  Hurts more if walks too much, but also hurts more if doesn't walk at all.  Deep in hip joint is location.  Almost feels that sciatic nerve may be trapped.  Some numbness noted into LE, mostly on the L.  Radiates into mid thigh of L leg.  Experience of radicular pain at least 2x/week.  Some level of discomfort noted at all tiimes, but intensity fluxuates.  Has desk job as .  Sits usually with feel under seat.  Comfortable chair with back and arm rests.  Sleep is difficult.  Tends to ache a lot at night.  Tends to sleep on L side but struggles with low back in rolling.  Seated is tolerable but most pain noted with standing upright. Used ice pack for pain relief, some Tylenol.  Mom has scoliosis but she has never been diagnosed with it at all.   -SW       Subjective Pain    Able to rate subjective pain?  yes  -SW    Pre-Treatment Pain Level  5  -SW    Post-Treatment Pain Level  5  -SW    Subjective Pain Comment  Mostly in L hip but intermittent pains noted on R side as well.   -SW       Posture/Observations    Posture- WNL  Posture is WNL  -SW    Posture/Observations Comments  Standing posture revealed downsloping of L shoudler and elevation of R side.  IC slightly elevated on R side.  Gait equal and symmetrical bilaterally.  No sign of toe drag present. L hand dominance.  -SW       Quarter Clearing    Quarter Clearing  Lower Quarter Clearing  -SW       DTR- Lower Quarter Clearing    Patellar tendon (L2-4)  2- Normal response  -SW    Achilles tendon (S1-2)  2- Normal response  -SW       Neural Tension Signs- Lower Quarter Clearing    Slump  Negative  -SW    SLR  Negative  -SW       Sensory Screen for Light Touch- Lower Quarter Clearing    L1 (inguinal area)  Intact  -SW    L2 (anterior mid thigh)  Intact  -SW    L3 (distal anterior thigh)  Intact  -SW     L4 (medial lower leg/foot)  Intact  -SW    L5 (lateral lower leg/great toe)  Intact  -SW    S1 (bottom of foot)  Intact  -SW       Myotomal Screen- Lower Quarter Clearing    Hip flexion (L2)  WNL;5 (Normal)  -SW    Knee extension (L3)  WNL;5 (Normal)  -SW    Ankle DF (L4)  WNL  -SW    Great toe extension (L5)  WNL  -SW    Ankle PF (S1)  WNL  -SW    Knee flexion (S2)  WNL;5 (Normal)  -SW       Lumbar ROM Screen- Lower Quarter Clearing    Lumbar Flexion  Normal  -SW    Lumbar Extension  Normal  -SW    Lumbar Lateral Flexion  Normal SB toward the R inc pain on L side  -SW    Lumbar Rotation  Normal  -SW       SI/Hip Screen- Lower Quarter Clearing    ASIS compression  Bilateral:;Positive  -SW    ASIS distraction  Negative  -SW    Kingsley's/Dennis's test  Bilateral:;Positive  -SW    Posterior thigh sheer  Negative  -SW    Pain in Judi's area  Bilateral:;Positive  -SW       Special Tests/Palpation    Special Tests/Palpation  Lumbar/SI  -SW       Lumbosacral Accessory Motions    Lumbosacral Accessory Motions Tested?  Yes fairly good AP glide and sacral mobility noted.   -SW    PA glide- Sacral base  -- L base slightly prominent; deep on R  -SW    Innominate rotation  -- Ant innom on R side  -SW       Lumbar/SI Special Tests    Slump Test (Neural Tension)  Negative  -SW    SLR (Neural Tension)  Negative  -SW    SI Compression Test (SI Dysfunction)  Bilateral:;Positive  -SW    SI Distraction Test (SI Dysfunction)  Negative  -SW    KINGSLEY (hip vs. SI Dysfunction)  Bilateral:;Positive  -SW    Sacral Spring Test (SI Dysfunction)  Negative  -SW    Lumbar/SI Special Tests Comments  Patient show good mobility with AP glide throughout spine.  Neg discogenic nature of s/s.  SI and innominate asymmetry noted with alignment assessment. RLE ant in supine.  -SW       Lumbosacral Palpation    Lumbosacral Palpation?  Yes  -SW    SI  -- good mobility without pain  -SW    Lumbosacral Segment  Left:;Guarded/taut more defined along L side  muscles  -SW    Piriformis  Left:;Tender mild R side  -SW    Quadratus Lumborum  Left:;Guarded/taut  -SW    Erector Spinae (Paraspinals)  Left:;Guarded/taut  -SW    Greater Tuberosity  Left:;Tender  -SW    Lumbosacral Palpation Comments  Patient presents with more defined musculature along L side paraspinals.  + GTB noted on L which could indicate bursitis due to asymmetry of pelvic girdle.   -SW       Special Lumbosacral Questions    Are you pregnant?  Yes  -SW    Have you had any abdominal surgeries?  No  -SW    Do you have pain when standing on one leg?  Yes  -SW    Do you have pain going up/down stairs?  Yes  -SW       General ROM    GENERAL ROM COMMENTS  functional ROM noted to BLE and UE   -SW       MMT (Manual Muscle Testing)    General MMT Comments  functional 5/5 MMT throughout  -SW       Flexibility    Flexibility Tested?  Lower Extremity  -SW       Lower Extremity Flexibility    Overall LE Flexibility  Mildly limited  -SW       Balance Skills Training    Balance Comments  seated and standing unremarkable.   -SW       Transfers    Comment (Transfers)  independent without stress.  Cues for log roll required.  -SW       Gait/Stairs Assessment/Training    Comment (Gait/Stairs)  normal without antalgia noted.   -SW      User Key  (r) = Recorded By, (t) = Taken By, (c) = Cosigned By    Initials Name Provider Type    Aarti Marino, PT DPT Physical Therapist                     PT Assessment/Plan     Row Name 07/23/20 1200          PT Assessment    Functional Limitations  Limitation in home management;Performance in leisure activities;Performance in self-care ADL;Performance in work activities  -SW     Impairments  Impaired muscle length;Impaired postural alignment;Muscle strength;Poor body mechanics;Posture  -SW     Assessment Comments  Patient is a 30 yo female presenting to clinic with musculoskeletal complaints of hip pain which clinically presents as GTB L hip.  She exhibits asymmetry to pelvic  girdle which may also contribute.  She would benefit from skilled therapy intervention to address alignment, muscle balance and promote QOL and dec pain.   -     Rehab Potential  Good  -     Patient/caregiver participated in establishment of treatment plan and goals  Yes  -SW     Patient would benefit from skilled therapy intervention  Yes  -SW        PT Plan    PT Frequency  1x/week  -     Predicted Duration of Therapy Intervention (Therapy Eval)  10-15 visits  -     PT Plan Comments  manual therapy for alignment, muscle release, ther ex for flexibility, stabilization and strength.  Posture and body mechanics as needed along with ergonomic design.    -       User Key  (r) = Recorded By, (t) = Taken By, (c) = Cosigned By    Initials Name Provider Type    ELI Kruse Aartijohnson Arellano, PT DPT Physical Therapist            OP Exercises     Row Name 07/23/20 0800             Subjective Comments    Subjective Comments  Hip pain noted with initially at 10 weeks.  Hard to find a medium of enough walking.  Hurts more if walks too much, but also hurts more if doesn't walk at all.  Deep in hip joint is location.  Almost feels that sciatic nerve may be trapped.  Some numbness noted into LE, mostly on the L.  Radiates into mid thigh of L leg.  Experience of radicular pain at least 2x/week.  Some level of discomfort noted at all tiimes, but intensity fluxuates.  Has desk job as .  Sits usually with feel under seat.  Comfortable chair with back and arm rests.  Sleep is difficult.  Tends to ache a lot at night.  Tends to sleep on L side but struggles with low back in rolling.  Seated is tolerable but most pain noted with standing upright. Used ice pack for pain relief, some Tylenol.  Mom has scoliosis but she has never been diagnosed with it at all.   -         Subjective Pain    Able to rate subjective pain?  yes  -SW      Pre-Treatment Pain Level  5  -SW      Post-Treatment Pain Level  5  -SW       Subjective Pain Comment  Mostly in L hip but intermittent pains noted on R side as well.   -SW         Exercise 1    Exercise Name 1  ITB stretch standing at wall.   -SW      Reps 1  3  -SW      Time 1  30 sec  -SW         Exercise 2    Exercise Name 2  Hamstring stretch   -SW      Reps 2  3  -SW      Time 2  30 sec  -SW         Exercise 3    Exercise Name 3  piriformis stretch   -SW      Reps 3  3  -SW      Time 3  30 sec  -SW        User Key  (r) = Recorded By, (t) = Taken By, (c) = Cosigned By    Initials Name Provider Type    SW Aarti Kruse, PT DPT Physical Therapist                      PT OP Goals     Row Name 07/23/20 1300          PT Short Term Goals    STG Date to Achieve  08/20/20  -     STG 1  patient to be independent and compliant with HEP as directed 2x/day  -     STG 1 Progress  New  -     STG 2  patient to report with improved symmetry to pelvic girdle in supine position such that no MET is required x 3 consecutive days.   -     STG 2 Progress  New  -     STG 3  patient to report with dec pain along GTB L side such that palpational tenderness is reduced and not detected along lateral ITB of LLE, having been localized to GTB  -     STG 3 Progress  New  -     STG 4  patient to report with pain mild no greater than 3/10 before or during or after work.  -     STG 4 Progress  New  -     STG 5  Patient to demonstrate normal positioning for sleep as to protect spine and position for hip as to dec stress upon standing with less pain.  -     STG 5 Progress  New  [facility]        Long Term Goals    LTG Date to Achieve  10/02/20  -     LTG 1  subjectively improve s/s by 70% overall   -     LTG 1 Progress  New  -     LTG 2  patient to rate Mod Oswestry score of 10% disability rating or less indicating improved daily function  -     LTG 2 Progress  New  -     LTG 3  No missed days of work due to pain limiting her tolerance to daily activities.  -     LTG 3 Progress  New  -         Time Calculation    PT Goal Re-Cert Due Date  08/20/20  -ELI       User Key  (r) = Recorded By, (t) = Taken By, (c) = Cosigned By    Initials Name Provider Type    Aarti Marino, PT DPT Physical Therapist          Therapy Education  Given: HEP  Program: New  How Provided: Verbal, Demonstration, Written  Provided to: Patient  Level of Understanding: Teach back education performed, Verbalized, Demonstrated     Outcome Measure Options: Modifed Owestry  Modified Oswestry  Modified Oswestry Score/Comments: 14/50=28%      Time Calculation:   Start Time: 0818  Stop Time: 0920  Time Calculation (min): 62 min  Therapy Charges for Today     Code Description Service Date Service Provider Modifiers Qty    86183957860 HC PT THER PROC EA 15 MIN 7/23/2020 Aarti Kruse, PT DPT GP 1    63548294642 HC PT EVAL MOD COMPLEXITY 3 7/23/2020 Aarti Kruse, PT DPT GP 1          PT G-Codes  Outcome Measure Options: Modifed Owestry  Modified Oswestry Score/Comments: 14/50=28%       Aarti Kruse, PT DPT  7/23/2020

## 2020-07-29 ENCOUNTER — ROUTINE PRENATAL (OUTPATIENT)
Dept: OBSTETRICS AND GYNECOLOGY | Facility: CLINIC | Age: 30
End: 2020-07-29

## 2020-07-29 VITALS — WEIGHT: 189.4 LBS | BODY MASS INDEX: 33.55 KG/M2 | DIASTOLIC BLOOD PRESSURE: 82 MMHG | SYSTOLIC BLOOD PRESSURE: 118 MMHG

## 2020-07-29 DIAGNOSIS — O09.93 SUPERVISION OF HIGH RISK PREGNANCY IN THIRD TRIMESTER: ICD-10-CM

## 2020-07-29 DIAGNOSIS — O99.213 OBESITY AFFECTING PREGNANCY IN THIRD TRIMESTER: ICD-10-CM

## 2020-07-29 DIAGNOSIS — Z3A.30 30 WEEKS GESTATION OF PREGNANCY: Primary | ICD-10-CM

## 2020-07-29 DIAGNOSIS — O26.892 PREGNANCY RELATED HIP PAIN IN SECOND TRIMESTER, ANTEPARTUM: ICD-10-CM

## 2020-07-29 DIAGNOSIS — M25.559 PREGNANCY RELATED HIP PAIN IN SECOND TRIMESTER, ANTEPARTUM: ICD-10-CM

## 2020-07-29 PROCEDURE — 0502F SUBSEQUENT PRENATAL CARE: CPT | Performed by: NURSE PRACTITIONER

## 2020-07-30 ENCOUNTER — HOSPITAL ENCOUNTER (OUTPATIENT)
Dept: PHYSICAL THERAPY | Facility: HOSPITAL | Age: 30
Setting detail: THERAPIES SERIES
Discharge: HOME | End: 2020-07-30

## 2020-07-30 DIAGNOSIS — O26.893 PREGNANCY RELATED HIP PAIN IN THIRD TRIMESTER, ANTEPARTUM: Primary | ICD-10-CM

## 2020-07-30 DIAGNOSIS — M25.559 PREGNANCY RELATED HIP PAIN IN THIRD TRIMESTER, ANTEPARTUM: Primary | ICD-10-CM

## 2020-07-30 PROCEDURE — 97110 THERAPEUTIC EXERCISES: CPT | Performed by: PHYSICAL THERAPIST

## 2020-07-30 PROCEDURE — 97035 APP MDLTY 1+ULTRASOUND EA 15: CPT | Performed by: PHYSICAL THERAPIST

## 2020-07-30 PROCEDURE — 97140 MANUAL THERAPY 1/> REGIONS: CPT | Performed by: PHYSICAL THERAPIST

## 2020-07-30 NOTE — THERAPY TREATMENT NOTE
Outpatient Physical Therapy Pelvic Health Treatment Note  [facility]     Patient Name: Lissa Vargas  : 1990  MRN: [id number]  Today's Date: 2020        Visit Date: 2020  Visit Number:   Insurance: [facility]  Recheck: 20    Visit Dx:    ICD-10-CM ICD-9-CM   1. Pregnancy related hip pain in third trimester, antepartum O26.893 646.83    M25.559 719.45       Patient Active Problem List   Diagnosis   • Dysthymic disorder   • History of anxiety   • History of depression   • Obesity affecting pregnancy in third trimester   • Supervision of high risk pregnancy in third trimester   • Pregnancy related hip pain in second trimester, antepartum   • Rh negative status during pregnancy in third trimester        Pelvic Health     Row Name 20 1400             Pregnancy Questions    Number of Pregnancies  1  -SW      Number of Miscarriages  0  -SW      How many weeks pregnant are you?  30 weeks  -SW      Due Date  10/06/20  -SW         Pain Assessment    Pain Assessment  0-10  -SW      Pain Score  5  -SW      Post Pain Score  4  -SW      Pain Location  Hip  -SW      Pain Orientation  Left  -SW        User Key  (r) = Recorded By, (t) = Taken By, (c) = Cosigned By    Initials Name Provider Type    SW Aarti Kruse, PT DPT Physical Therapist        PT Ortho     Row Name 20 1400       Subjective Comments    Subjective Comments  Both hips are achey today.  Has been more sore with stretching you issued last date.  Still the L than R but more noticable on the R than before the stretching.   -SW       Subjective Pain    Able to rate subjective pain?  yes  -SW    Pre-Treatment Pain Level  5  -SW    Post-Treatment Pain Level  4  -SW       Posture/Observations    Posture- WNL  Posture is WNL  -SW    Posture/Observations Comments  Ant innom on R side; sacral alignment improved and symmetrical.  Gait equal and unlabored this date.    -SW       Quarter Clearing    Quarter Clearing  Lower  Quarter Clearing  -SW       DTR- Lower Quarter Clearing    Patellar tendon (L2-4)  2- Normal response  -SW    Achilles tendon (S1-2)  2- Normal response  -SW       Neural Tension Signs- Lower Quarter Clearing    Slump  Negative  -SW    SLR  Negative  -SW       Sensory Screen for Light Touch- Lower Quarter Clearing    L1 (inguinal area)  Intact  -SW    L2 (anterior mid thigh)  Intact  -SW    L3 (distal anterior thigh)  Intact  -SW    L4 (medial lower leg/foot)  Intact  -SW    L5 (lateral lower leg/great toe)  Intact  -SW    S1 (bottom of foot)  Intact  -SW       Myotomal Screen- Lower Quarter Clearing    Hip flexion (L2)  WNL;5 (Normal)  -SW    Knee extension (L3)  WNL;5 (Normal)  -SW    Ankle DF (L4)  WNL  -SW    Great toe extension (L5)  WNL  -SW    Ankle PF (S1)  WNL  -SW    Knee flexion (S2)  WNL;5 (Normal)  -SW       Lumbar ROM Screen- Lower Quarter Clearing    Lumbar Flexion  Normal  -SW    Lumbar Extension  Normal  -SW    Lumbar Lateral Flexion  Normal SB toward the R inc pain on L side  -SW    Lumbar Rotation  Normal  -SW       SI/Hip Screen- Lower Quarter Clearing    ASIS compression  Bilateral:;Positive  -SW    ASIS distraction  Negative  -SW    Kingsley's/Dennis's test  Bilateral:;Positive  -SW    Posterior thigh sheer  Negative  -SW    Pain in Judi's area  Bilateral:;Positive  -SW       Special Tests/Palpation    Special Tests/Palpation  Lumbar/SI  -SW       Lumbosacral Accessory Motions    Lumbosacral Accessory Motions Tested?  Yes  -SW    PA glide- Sacral base  -- more symmetrical in prone  -SW    Innominate rotation  -- cont with R ant innom supine  -SW       Lumbar/SI Special Tests    SLR (Neural Tension)  Negative  -SW    SI Compression Test (SI Dysfunction)  Bilateral:;Positive  -SW    SI Distraction Test (SI Dysfunction)  Negative  -SW    KINGSLEY (hip vs. SI Dysfunction)  Bilateral:;Positive R hip tighter than L with testing.   -SW    Lumbar/SI Special Tests Comments  patient's R hip mobility more  restricted than L side.    -SW       Lumbosacral Palpation    Lumbosacral Palpation?  Yes  -SW    SI  -- good mobility without pain  -SW    Lumbosacral Segment  -- more defined along L side muscles  -SW    Piriformis  Left:;Tender mild R side  -SW    Quadratus Lumborum  Left:;Guarded/taut  -SW    Erector Spinae (Paraspinals)  Left:;Guarded/taut  -SW    Greater Tuberosity  Left:;Tender  -SW    Lumbosacral Palpation Comments  cont with ant innom on R side, but sacral mobility improved with good symmetry.  ITB on L side more symptomatic than R with localizd ttp at GTB L side.   -SW       Flexibility    Flexibility Tested?  Lower Extremity  -SW      User Key  (r) = Recorded By, (t) = Taken By, (c) = Cosigned By    Initials Name Provider Type    Aarti Marino, PT DPT Physical Therapist                     PT Assessment/Plan     Row Name 07/30/20 1400          PT Assessment    Functional Limitations  Limitation in home management;Performance in leisure activities;Performance in self-care ADL;Performance in work activities  -SW     Impairments  Impaired muscle length;Impaired postural alignment;Muscle strength;Poor body mechanics;Posture  -SW     Assessment Comments  Patient's clinical presentation contd with R ant innom though sacrum with improved position.  Most ttp localized to L GTB this date.  Initiated US to GTB L side to assist with pain.  Will monitor progress with it and manual treatment.  Alignment improved with MET.  Tissues of lumbosacral region presented with less spasm and tenderness.  Still with QL tension L side noted. Patient is compliant with HEP as directed.    -SW     Rehab Potential  Good  -SW     Patient/caregiver participated in establishment of treatment plan and goals  Yes  -SW     Patient would benefit from skilled therapy intervention  Yes  -SW        PT Plan    PT Frequency  1x/week  -SW     Predicted Duration of Therapy Intervention (Therapy Eval)  10-15 visits  -SW     PT Plan  Comments  cont US to L hip as beneficial.  Consider R side if improved. MET for alignment.  Add clamshells next visit.  -SW       User Key  (r) = Recorded By, (t) = Taken By, (c) = Cosigned By    Initials Name Provider Type    Aarti Marino, PT DPT Physical Therapist          Modalities     Row Name 07/30/20 1400             Ultrasound 07825    Location  L GTB  -SW      Duty Cycle  100  -SW      Frequency  1.0 MHz  -SW      Intensity - Wts/cm  1.5  -SW      47612 - PT Ultrasound Minutes  10  -SW        User Key  (r) = Recorded By, (t) = Taken By, (c) = Cosigned By    Initials Name Provider Type    Aarti Marino, PT DPT Physical Therapist        OP Exercises     Row Name 07/30/20 1400             Subjective Comments    Subjective Comments  Both hips are achey today.  Has been more sore with stretching you issued last date.  Still the L than R but more noticable on the R than before the stretching.   -SW         Subjective Pain    Able to rate subjective pain?  yes  -SW      Pre-Treatment Pain Level  5  -SW      Post-Treatment Pain Level  4  -SW         Exercise 1    Exercise Name 1  ITB stretch standing at wall.   -SW      Reps 1  3  -SW      Time 1  30 sec  -SW         Exercise 2    Exercise Name 2  Hamstring stretch   -SW      Reps 2  3  -SW      Time 2  30 sec  -SW         Exercise 3    Exercise Name 3  piriformis stretch   -SW      Reps 3  3  -SW      Time 3  30 sec  -SW         Exercise 4    Exercise Name 4  angry cat  -SW      Reps 4  10  -SW      Time 4  5 sec  -SW         Exercise 5    Exercise Name 5  ITB stretch sidelying  -SW      Reps 5  3  -SW      Time 5  30 sec  -SW         Exercise 6    Exercise Name 6  QL stretch   -SW      Reps 6  3  -SW      Time 6  30 sec  -SW        User Key  (r) = Recorded By, (t) = Taken By, (c) = Cosigned By    Initials Name Provider Type    Aarti Marino, PT DPT Physical Therapist           Manual Rx (last 36 hours)      Manual Treatments     Row  Name 07/30/20 1300             Manual Rx 1    Manual Rx 1 Location  Manual ITB release L side.   -SW      Manual Rx 1 Type  MFR and cupping   -        User Key  (r) = Recorded By, (t) = Taken By, (c) = Cosigned By    Initials Name Provider Type    Aarti Marino, PT DPT Physical Therapist                    PT OP Goals     Row Name 07/30/20 1400          PT Short Term Goals    STG Date to Achieve  08/20/20  -     STG 1  patient to be independent and compliant with HEP as directed 2x/day  -SW     STG 1 Progress  Progressing  -     STG 2  patient to report with improved symmetry to pelvic girdle in supine position such that no MET is required x 3 consecutive days.   -SW     STG 2 Progress  New  -     STG 3  patient to report with dec pain along GTB L side such that palpational tenderness is reduced and not detected along lateral ITB of LLE, having been localized to GTB  -SW     STG 3 Progress  New  -     STG 4  patient to report with pain mild no greater than 3/10 before or during or after work.  -     STG 4 Progress  New  -     STG 5  Patient to demonstrate normal positioning for sleep as to protect spine and position for hip as to dec stress upon standing with less pain.  -     STG 5 Progress  Met  -        Long Term Goals    LTG Date to Achieve  10/02/20  -     LTG 1  subjectively improve s/s by 70% overall   -     LTG 1 Progress  New  -     LTG 2  patient to rate Mod Oswestry score of 10% disability rating or less indicating improved daily function  -     LTG 2 Progress  New  -     LTG 3  No missed days of work due to pain limiting her tolerance to daily activities.  -     LTG 3 Progress  New  -        Time Calculation    PT Goal Re-Cert Due Date  08/20/20  -       User Key  (r) = Recorded By, (t) = Taken By, (c) = Cosigned By    Initials Name Provider Type    Aarti Marino, PT DPT Physical Therapist           Therapy Education  Given: HEP  Program: New  Reinforced  How Provided: Verbal, Demonstration  Provided to: Patient  Level of Understanding: Teach back education performed, Verbalized, Demonstrated              Time Calculation:   Start Time: 1402  Stop Time: 1503  Time Calculation (min): 61 min  Therapy Charges for Today     Code Description Service Date Service Provider Modifiers Qty    97978444853 HC PT ULTRASOUND EA 15 MIN 7/30/2020 Aarti Kruse, PT DPT GP 1    25361038990 HC PT MANUAL THERAPY EA 15 MIN 7/30/2020 Aarti Kruse, PT DPT GP 1    00822897111  PT THER PROC EA 15 MIN 7/30/2020 Aarti Kruse, PT DPT GP 2                    Aarti Kruse, PT DPT  7/30/2020

## 2020-08-06 ENCOUNTER — HOSPITAL ENCOUNTER (OUTPATIENT)
Dept: PHYSICAL THERAPY | Facility: HOSPITAL | Age: 30
Setting detail: THERAPIES SERIES
Discharge: HOME | End: 2020-08-06

## 2020-08-06 DIAGNOSIS — O26.893 PREGNANCY RELATED HIP PAIN IN THIRD TRIMESTER, ANTEPARTUM: Primary | ICD-10-CM

## 2020-08-06 DIAGNOSIS — M25.559 PREGNANCY RELATED HIP PAIN IN THIRD TRIMESTER, ANTEPARTUM: Primary | ICD-10-CM

## 2020-08-06 PROCEDURE — 97035 APP MDLTY 1+ULTRASOUND EA 15: CPT | Performed by: PHYSICAL THERAPIST

## 2020-08-06 PROCEDURE — 97140 MANUAL THERAPY 1/> REGIONS: CPT | Performed by: PHYSICAL THERAPIST

## 2020-08-06 PROCEDURE — 97110 THERAPEUTIC EXERCISES: CPT | Performed by: PHYSICAL THERAPIST

## 2020-08-06 NOTE — THERAPY TREATMENT NOTE
Outpatient Physical Therapy Pelvic Health Treatment Note  [facility]     Patient Name: Lissa Vargas  : 1990  MRN: [id number]  Today's Date: 2020        Visit Date: 2020  Visit number: 3/3  Insurance: [facility]  Recheck: 20      Visit Dx:    ICD-10-CM ICD-9-CM   1. Pregnancy related hip pain in third trimester, antepartum O26.893 646.83    M25.559 719.45       Patient Active Problem List   Diagnosis   • Dysthymic disorder   • History of anxiety   • History of depression   • Obesity affecting pregnancy in third trimester   • Supervision of high risk pregnancy in third trimester   • Pregnancy related hip pain in second trimester, antepartum   • Rh negative status during pregnancy in third trimester        Pelvic Health     Row Name 20 1500             Pregnancy Questions    Number of Pregnancies  1  -SW      Number of Miscarriages  0  -SW      How many weeks pregnant are you?  31 weeks  -SW      Due Date  10/06/20  -SW         Pain Assessment    Pain Assessment  0-10  -SW      Pain Score  6  -SW      Post Pain Score  3  -SW      Pain Location  Hip  -SW      Pain Orientation  Left  -SW        User Key  (r) = Recorded By, (t) = Taken By, (c) = Cosigned By    Initials Name Provider Type    Aarti Marino, PT DPT Physical Therapist        PT Ortho     Row Name 20 1500       Subjective Comments    Subjective Comments  I didn't notice any difference with treatment.  Has required Tylenol at bedtime since last treatment.  Feels like a pinching in leg.  Achey all the time and sharp pain intermittently on L.  Almost feels like something is catching in the L hip with sit to stand position.    -SW       Subjective Pain    Able to rate subjective pain?  yes  -SW    Pre-Treatment Pain Level  6  -SW    Post-Treatment Pain Level  3  -SW       Posture/Observations    Posture- WNL  Posture is WNL  -SW    Posture/Observations Comments  Pinpoints pain directly at GT region of L hip  and directly post toward piriformis of L hip.  Gait non-antalgic.  Standing with IC elevated on R side.  With heel lift on L, improved symmetry noted.   -SW       Quarter Clearing    Quarter Clearing  Lower Quarter Clearing  -SW       DTR- Lower Quarter Clearing    Patellar tendon (L2-4)  2- Normal response  -SW    Achilles tendon (S1-2)  2- Normal response  -SW       Neural Tension Signs- Lower Quarter Clearing    Slump  Negative  -SW    SLR  Negative  -SW       Sensory Screen for Light Touch- Lower Quarter Clearing    L1 (inguinal area)  Intact  -SW    L2 (anterior mid thigh)  Intact  -SW    L3 (distal anterior thigh)  Intact  -SW    L4 (medial lower leg/foot)  Intact  -SW    L5 (lateral lower leg/great toe)  Intact  -SW    S1 (bottom of foot)  Intact  -SW       Myotomal Screen- Lower Quarter Clearing    Hip flexion (L2)  WNL;5 (Normal)  -SW    Knee extension (L3)  WNL;5 (Normal)  -SW    Ankle DF (L4)  WNL  -SW    Great toe extension (L5)  WNL  -SW    Ankle PF (S1)  WNL  -SW    Knee flexion (S2)  WNL;5 (Normal)  -SW       Lumbar ROM Screen- Lower Quarter Clearing    Lumbar Flexion  Normal  -SW    Lumbar Extension  Normal  -SW    Lumbar Lateral Flexion  Normal  -SW    Lumbar Rotation  Normal  -SW       SI/Hip Screen- Lower Quarter Clearing    ASIS compression  Bilateral:;Positive  -SW    ASIS distraction  Negative  -SW    Kingsley's/Dennis's test  Bilateral:;Positive  -SW    Posterior thigh sheer  Negative  -SW    Pain in Judi's area  Bilateral:;Positive  -SW       Special Tests/Palpation    Special Tests/Palpation  Lumbar/SI  -SW       Lumbosacral Accessory Motions    Lumbosacral Accessory Motions Tested?  Yes  -SW    PA glide- Sacral base  -- more symmetrical in prone  -SW    Innominate rotation  -- more symmetrical this date  -SW       Lumbar/SI Special Tests    SLR (Neural Tension)  Negative  -SW    SI Compression Test (SI Dysfunction)  Bilateral:;Positive  -SW    SI Distraction Test (SI Dysfunction)  Negative   -    ZACH (hip vs. SI Dysfunction)  Bilateral:;Positive R hip tighter than L with testing.   -SW    Lumbar/SI Special Tests Comments  Patient continues with restricted rotation to R hip compared to L.  Improved alignment this date while supine.    -SW       Lumbosacral Palpation    Lumbosacral Palpation?  Yes  -    SI  -- good mobility without pain  -    Lumbosacral Segment  -- more defined along L side muscles  -SW    Piriformis  Left:;Tender mild R side  -SW    Quadratus Lumborum  --  -SW    Erector Spinae (Paraspinals)  --  -SW    Greater Tuberosity  Left:;Tender  -SW    Lumbosacral Palpation Comments  Improved alignment this date in supine.  IC elevated in standing on R side.  Tenderness directly at GTB of L hip and into piriformis muscle posteriorly.  Less tension noted to LB this date with improved fascial glide to paraspinals noted.   -       Flexibility    Flexibility Tested?  Lower Extremity  -SW       Lower Extremity Flexibility    Overall LE Flexibility  Mildly limited  -      User Key  (r) = Recorded By, (t) = Taken By, (c) = Cosigned By    Initials Name Provider Type    Aarti Marino, PT DPT Physical Therapist                     PT Assessment/Plan     Row Name 08/06/20 1500          PT Assessment    Functional Limitations  Limitation in home management;Performance in leisure activities;Performance in self-care ADL;Performance in work activities  -     Impairments  Impaired muscle length;Impaired postural alignment;Muscle strength;Poor body mechanics;Posture  -     Assessment Comments  Patient not showing much change in s/s with last treatment.  Added Heel lift to assist with symmetry in standing while at work.  Also completed Combo to GTB and slightly post to trochanter due to muscular tension and aching within hip and muscle region.  Patient noted with improved pain post treatment.  No adverse effects with treatment observed.  Will assess progress next visit.    -SW     Rehab  Potential  Good  -SW     Patient/caregiver participated in establishment of treatment plan and goals  Yes  -SW     Patient would benefit from skilled therapy intervention  Yes  -SW        PT Plan    PT Frequency  1x/week  -SW     Predicted Duration of Therapy Intervention (Therapy Eval)  10-15 visits  -SW     PT Plan Comments  cont comb as beneficial.  Add clamshells next visit and hip abd.    -SW       User Key  (r) = Recorded By, (t) = Taken By, (c) = Cosigned By    Initials Name Provider Type    Aarti Marino, PT DPT Physical Therapist          Modalities     Row Name 08/06/20 1500             Ultrasound 48256    Combo RX Minutes 87125  L GTB and slightly post toward piriformis attachment.   -SW      Duty Cycle  100  -SW      Frequency  1.0 MHz  -SW      Intensity - Wts/cm  1.5  -SW      10008 - PT Ultrasound Minutes  10  -SW        User Key  (r) = Recorded By, (t) = Taken By, (c) = Cosigned By    Initials Name Provider Type    Aarti Marino, PT DPT Physical Therapist        OP Exercises     Row Name 08/06/20 1500             Subjective Comments    Subjective Comments  I didn't notice any difference with treatment.  Has required Tylenol at bedtime since last treatment.  Feels like a pinching in leg.  Achey all the time and sharp pain intermittently on L.  Almost feels like something is catching in the L hip with sit to stand position.    -SW         Subjective Pain    Able to rate subjective pain?  yes  -SW      Pre-Treatment Pain Level  6  -SW      Post-Treatment Pain Level  3  -SW         Exercise 1    Exercise Name 1  Heel lift assessment and placement  -      Additional Comments  L heel lift applied with good tolerance minus one layer.  Good tolerance and symmetrical in standing with placement.   -SW         Exercise 2    Exercise Name 2  Hamstring stretch   -SW      Reps 2  2  -SW      Time 2  30 sec  -SW         Exercise 3    Exercise Name 3  piriformis stretch   -SW      Reps 3  2  -SW       Time 3  30 sec  -SW         Exercise 4    Exercise Name 4  seated pelvic tilts  -SW      Reps 4  10  -SW         Exercise 5    Exercise Name 5  adductor stretch bilaterally   -SW      Reps 5  3  -SW      Time 5  30 sec  -SW         Exercise 6    Exercise Name 6  ITB stretch in SL  -SW      Reps 6  3  -SW      Time 6  30 sec  -SW        User Key  (r) = Recorded By, (t) = Taken By, (c) = Cosigned By    Initials Name Provider Type    Aarti Marino, PT DPT Physical Therapist           Manual Rx (last 36 hours)      Manual Treatments     Row Name 08/06/20 1700             Manual Rx 1    Manual Rx 1 Location  Alignment assessment supine   -SW         Manual Rx 2    Manual Rx 2 Location  Manual ITB release L side  -SW      Manual Rx 2 Type  MFR and cupping  -SW        User Key  (r) = Recorded By, (t) = Taken By, (c) = Cosigned By    Initials Name Provider Type    Aarti Marino, PT DPT Physical Therapist                    PT OP Goals     Row Name 08/06/20 1500          PT Short Term Goals    STG Date to Achieve  08/20/20  -     STG 1  patient to be independent and compliant with HEP as directed 2x/day  -     STG 1 Progress  Met  -     STG 2  patient to report with improved symmetry to pelvic girdle in supine position such that no MET is required x 3 consecutive days.   -SW     STG 2 Progress  Progressing  -     STG 3  patient to report with dec pain along GTB L side such that palpational tenderness is reduced and not detected along lateral ITB of LLE, having been localized to GTB  -SW     STG 3 Progress  New  -     STG 4  patient to report with pain mild no greater than 3/10 before or during or after work.  -     STG 4 Progress  New  -     STG 5  Patient to demonstrate normal positioning for sleep as to protect spine and position for hip as to dec stress upon standing with less pain.  -     STG 5 Progress  Met  -        Long Term Goals    LTG Date to Achieve  10/02/20  -     LTG 1   subjectively improve s/s by 70% overall   -     LTG 1 Progress  New  -     LTG 2  patient to rate Mod Oswestry score of 10% disability rating or less indicating improved daily function  -     LTG 2 Progress  New  -     LTG 3  No missed days of work due to pain limiting her tolerance to daily activities.  -     LTG 3 Progress  New  -        Time Calculation    PT Goal Re-Cert Due Date  08/20/20  -       User Key  (r) = Recorded By, (t) = Taken By, (c) = Cosigned By    Initials Name Provider Type    Aarti Marino, PT DPT Physical Therapist           Therapy Education  Given: HEP, Symptoms/condition management  Program: Reinforced  How Provided: Verbal, Demonstration  Provided to: Patient  Level of Understanding: Teach back education performed, Verbalized, Demonstrated              Time Calculation:   Start Time: 1602  Stop Time: 1705  Time Calculation (min): 63 min  Therapy Charges for Today     Code Description Service Date Service Provider Modifiers Qty    81860242456 HC PT ULTRASOUND EA 15 MIN 8/6/2020 Aarti Kruse, PT DPT GP 1    13727771540 HC PT MANUAL THERAPY EA 15 MIN 8/6/2020 Aarti Kruse, PT DPT GP 1    10619987636 HC PT THER PROC EA 15 MIN 8/6/2020 Aarti Kruse, PT DPT GP 2                    Aarti Kruse PT DPT  8/6/2020

## 2020-08-12 ENCOUNTER — ROUTINE PRENATAL (OUTPATIENT)
Dept: OBSTETRICS AND GYNECOLOGY | Facility: CLINIC | Age: 30
End: 2020-08-12

## 2020-08-12 VITALS — BODY MASS INDEX: 34.19 KG/M2 | DIASTOLIC BLOOD PRESSURE: 72 MMHG | WEIGHT: 193 LBS | SYSTOLIC BLOOD PRESSURE: 108 MMHG

## 2020-08-12 DIAGNOSIS — O09.93 SUPERVISION OF HIGH RISK PREGNANCY IN THIRD TRIMESTER: ICD-10-CM

## 2020-08-12 DIAGNOSIS — Z3A.32 32 WEEKS GESTATION OF PREGNANCY: Primary | ICD-10-CM

## 2020-08-12 DIAGNOSIS — O26.893 PREGNANCY RELATED HIP PAIN IN THIRD TRIMESTER, ANTEPARTUM: ICD-10-CM

## 2020-08-12 DIAGNOSIS — M25.559 PREGNANCY RELATED HIP PAIN IN THIRD TRIMESTER, ANTEPARTUM: ICD-10-CM

## 2020-08-12 DIAGNOSIS — O99.213 OBESITY AFFECTING PREGNANCY IN THIRD TRIMESTER: ICD-10-CM

## 2020-08-12 PROCEDURE — 0502F SUBSEQUENT PRENATAL CARE: CPT | Performed by: NURSE PRACTITIONER

## 2020-08-12 NOTE — PROGRESS NOTES
CC: Prenatal visit    Lissa Vargas is a 29 y.o.  at 32w1d.  Doing well.  Denies contractions, LOF, or VB.  Reports good FM. Pt has an appt with CHRISTEL Broussard tomorrow; reports hip pain is improving.     /72   Wt 87.5 kg (193 lb)   LMP 2019 (Exact Date)   BMI 34.19 kg/m²   SVE: Deferred  Fundal Height (cm): 32 cm  Fetal Heart Rate: 145     Problems (from 20 to present)     Problem Noted Resolved    Rh negative status during pregnancy in third trimester 7/15/2020 by Pattie Cook APRN No    Overview Signed 7/15/2020 11:52 AM by Pattie Cook APRN     Rhogam given 7/15/2020         Pregnancy related hip pain in third trimester, antepartum 2020 by Anusha Linares APRN No    Overview Addendum 2020 12:43 PM by Pattie Cook APRN     Seeing MINOO Broussard.          Supervision of high risk pregnancy in third trimester 3/26/2020 by Vida Post MD No    Overview Addendum 2020 12:36 PM by Pattie Cook APRN     O neg / Rubella immune / GBS unk  Dating: LMP = 1TUS (10wk)  Genetics: Declined  Tdap: given 7/15/20  Flu: Declined  Anatomy: Anatomy normal in appearance, Boy-name is a secret  1h Glucola: passed 1 hr  Rhogam: given 7/15/20  H&H/Plts: 28wk  Lab Results   Component Value Date    WBC 13.73 (H) 07/15/2020    HGB 12.8 07/15/2020    HCT 36.7 07/15/2020    MCV 83.6 07/15/2020     07/15/2020     Breast/BC undecided  Spouse: Ashish         History of anxiety 2020 by Anusha Linares APRN No    Overview Signed 3/26/2020  8:59 AM by Vida Post MD     Was on Wellbutrin XL         History of depression 2020 by Anusha Linares APRN No    Overview Signed 3/26/2020  8:58 AM by Vida Post MD     Was on Zoloft         Obesity affecting pregnancy in third trimester 2020 by Anusha Linares APRN No    Overview Signed 3/26/2020  8:58 AM by Sincere  Vida Barber MD     Class I obesity, PG BMI 33  Early glucola WNL               A/P: Lissa Vargas is a 29 y.o.  at 32w1d.  - Centering session #6: Epidural & stage 2 and 3 labor reviewed.   - PTL & FCKs precautions given.   - RTC in  2 weeks for Centering session #7.   - Reviewed COVID-19 visitation policy  - Reviewed COVID-19 precautions     Diagnosis Plan   1. 32 weeks gestation of pregnancy     2. Supervision of high risk pregnancy in third trimester     3. Obesity affecting pregnancy in third trimester     4. Pregnancy related hip pain in third trimester, antepartum       Pattie Yu, APRN  2020  12:38

## 2020-08-13 ENCOUNTER — HOSPITAL ENCOUNTER (OUTPATIENT)
Dept: PHYSICAL THERAPY | Facility: HOSPITAL | Age: 30
Setting detail: THERAPIES SERIES
Discharge: HOME | End: 2020-08-13

## 2020-08-13 DIAGNOSIS — M25.559 PREGNANCY RELATED HIP PAIN IN THIRD TRIMESTER, ANTEPARTUM: Primary | ICD-10-CM

## 2020-08-13 DIAGNOSIS — O26.893 PREGNANCY RELATED HIP PAIN IN THIRD TRIMESTER, ANTEPARTUM: Primary | ICD-10-CM

## 2020-08-13 PROCEDURE — 97035 APP MDLTY 1+ULTRASOUND EA 15: CPT | Performed by: PHYSICAL THERAPIST

## 2020-08-13 PROCEDURE — 97110 THERAPEUTIC EXERCISES: CPT | Performed by: PHYSICAL THERAPIST

## 2020-08-13 PROCEDURE — 97140 MANUAL THERAPY 1/> REGIONS: CPT | Performed by: PHYSICAL THERAPIST

## 2020-08-13 NOTE — THERAPY TREATMENT NOTE
Outpatient Physical Therapy Pelvic Health Treatment Note  [facility]     Patient Name: Lissa Vargas  : 1990  MRN: [id number]  Today's Date: 2020        Visit Date: 2020   Visit Number:   Insurance: [facility] group  Recheck: 20      Visit Dx:    ICD-10-CM ICD-9-CM   1. Pregnancy related hip pain in third trimester, antepartum O26.893 646.83    M25.559 719.45       Patient Active Problem List   Diagnosis   • Dysthymic disorder   • History of anxiety   • History of depression   • Obesity affecting pregnancy in third trimester   • Supervision of high risk pregnancy in third trimester   • Pregnancy related hip pain in third trimester, antepartum   • Rh negative status during pregnancy in third trimester        Pelvic Health     Row Name 20 1600             Pregnancy Questions    Number of Pregnancies  1  -SW      Number of Miscarriages  0  -SW      How many weeks pregnant are you?  32 weeks  -SW      Due Date  10/06/20  -SW         Pain Assessment    Pain Assessment  0-10  -SW      Pain Score  2  -SW      Pain Location  Hip  -SW      Pain Orientation  Left  -SW        User Key  (r) = Recorded By, (t) = Taken By, (c) = Cosigned By    Initials Name Provider Type    SW Aarti Kruse, PT DPT Physical Therapist        PT Ortho     Row Name 20 1600       Subjective Comments    Subjective Comments  Past three days has been good.  I have seen some improvements.  Not having to stall prior to walking when arising from seated position. R side a little more than L side today in regards to discomfort.  She has required no Tylenol like before.  Has used insert in shoe and believes that to have helped as well. MD on 20.  Patient believes we are getting somewhere in her treatments with re: pain reduction.   -SW       Subjective Pain    Able to rate subjective pain?  yes  -SW    Pre-Treatment Pain Level  2  -SW    Post-Treatment Pain Level  0  -SW       Posture/Observations     Posture- WNL  Posture is WNL  -SW    Posture/Observations Comments  heel lift present in shoe.  No signs of distress with gait or positioning on either side for treatment.   -SW       Quarter Clearing    Quarter Clearing  Lower Quarter Clearing  -SW       DTR- Lower Quarter Clearing    Patellar tendon (L2-4)  2- Normal response  -SW    Achilles tendon (S1-2)  2- Normal response  -SW       Neural Tension Signs- Lower Quarter Clearing    Slump  Negative  -SW    SLR  Negative  -SW       Sensory Screen for Light Touch- Lower Quarter Clearing    L1 (inguinal area)  Intact  -SW    L2 (anterior mid thigh)  Intact  -SW    L3 (distal anterior thigh)  Intact  -SW    L4 (medial lower leg/foot)  Intact  -SW    L5 (lateral lower leg/great toe)  Intact  -SW    S1 (bottom of foot)  Intact  -SW       Myotomal Screen- Lower Quarter Clearing    Hip flexion (L2)  WNL;5 (Normal)  -SW    Knee extension (L3)  WNL;5 (Normal)  -SW    Ankle DF (L4)  WNL  -SW    Great toe extension (L5)  WNL  -SW    Ankle PF (S1)  WNL  -SW    Knee flexion (S2)  WNL;5 (Normal)  -SW       Lumbar ROM Screen- Lower Quarter Clearing    Lumbar Flexion  Normal  -SW    Lumbar Extension  Normal  -SW    Lumbar Lateral Flexion  Normal  -SW    Lumbar Rotation  Normal  -SW       SI/Hip Screen- Lower Quarter Clearing    ASIS compression  Bilateral:;Positive  -SW    ASIS distraction  Negative  -SW    Kingsley's/Dennis's test  Bilateral:;Positive  -SW    Posterior thigh sheer  Negative  -SW    Pain in Judi's area  Bilateral:;Positive  -SW       Special Tests/Palpation    Special Tests/Palpation  Lumbar/SI  -SW       Lumbosacral Accessory Motions    Lumbosacral Accessory Motions Tested?  Yes  -SW    PA glide- Sacral base  --  -SW    Innominate rotation  --  -SW       Lumbar/SI Special Tests    SLR (Neural Tension)  Negative  -SW    SI Compression Test (SI Dysfunction)  Bilateral:;Positive  -SW    SI Distraction Test (SI Dysfunction)  Negative  -SW    KINGSLEY (hip vs. SI  Dysfunction)  Bilateral:;Positive R hip tighter than L with testing.   -SW    Lumbar/SI Special Tests Comments  continues with muscular tightness and joint restriction with rotation at hip.  But overal improving flexibility noted.    -SW       Lumbosacral Palpation    Lumbosacral Palpation?  Yes  -SW    SI  --  -SW    Lumbosacral Segment  --  -SW    Piriformis  Bilateral:;Tender  -SW    Greater Tuberosity  Bilateral:;Tender  -SW    Lumbosacral Palpation Comments  Presented this date with mild tenderness along bilateral R and L side at location of GT.  No tenderness elicited at ITB with improved fascial glide.   -SW       Flexibility    Flexibility Tested?  Lower Extremity  -      User Key  (r) = Recorded By, (t) = Taken By, (c) = Cosigned By    Initials Name Provider Type    Aarti Marino, PT DPT Physical Therapist                     PT Assessment/Plan     Row Name 08/13/20 1600          PT Assessment    Functional Limitations  Limitation in home management;Performance in leisure activities;Performance in self-care ADL;Performance in work activities  -SW     Impairments  Impaired muscle length;Impaired postural alignment;Muscle strength;Poor body mechanics;Posture  -SW     Assessment Comments  Patient is noting improved s/s in the L hip with treatment.  Initiated R side treatment in that pain also present there as well.  Patient tolerated new additions to exercise without inc complaints of pain.  Patient is very compliant with exercise as recommended and icing at least 1/day.  Patient is tolerating heel lift well with good tolerance and improvement for daily activities.   -SW     Rehab Potential  Good  -SW     Patient/caregiver participated in establishment of treatment plan and goals  Yes  -SW     Patient would benefit from skilled therapy intervention  Yes  -SW        PT Plan    PT Frequency  1x/week  -     Predicted Duration of Therapy Intervention (Therapy Eval)  10-15 visits  -SW     PT Plan  Comments  cont with treatment modality as it dec pain and improves function.  Add stability exercises of bridge over physioball next visit.    -SW       User Key  (r) = Recorded By, (t) = Taken By, (c) = Cosigned By    Initials Name Provider Type    SW Aarti Kruse, PT DPT Physical Therapist          Modalities     Row Name 08/13/20 1600             Ultrasound 70772    Combo RX Minutes 34271  L and R GTB and slightly post toward piriformis attachment.   -SW      Duty Cycle  100  -SW      Frequency  1.0 MHz  -SW      Intensity - Wts/cm  1.5  -SW      75650 - PT Ultrasound Minutes  20  -SW        User Key  (r) = Recorded By, (t) = Taken By, (c) = Cosigned By    Initials Name Provider Type    Aarti Marino, PT DPT Physical Therapist        OP Exercises     Row Name 08/13/20 1600             Subjective Comments    Subjective Comments  Past three days has been good.  I have seen some improvements.  Not having to stall prior to walking when arising from seated position. R side a little more than L side today in regards to discomfort.  She has required no Tylenol like before.  Has used insert in shoe and believes that to have helped as well. MD on 8/26/20.  Patient believes we are getting somewhere in her treatments with re: pain reduction.   -SW         Subjective Pain    Able to rate subjective pain?  yes  -SW      Pre-Treatment Pain Level  2  -SW      Post-Treatment Pain Level  0  -SW         Exercise 1    Exercise Name 1  Adductor stretch bilaterally   -SW      Reps 1  2  -SW      Time 1  30 sec  -SW         Exercise 2    Exercise Name 2  Hamstring stretch bilaterally   -SW      Reps 2  2  -SW      Time 2  30 sec  -SW         Exercise 3    Exercise Name 3  piriformis stretch   -SW      Reps 3  2  -SW      Time 3  30 sec  -SW         Exercise 4    Exercise Name 4  SL clamshells with TA  -SW      Sets 4  2  -SW      Reps 4  6  -SW      Additional Comments  bilaterally   -SW         Exercise 5    Exercise  Name 5  SL hip abd   -SW      Sets 5  2  -SW      Reps 5  10  -SW      Additional Comments  bilaterally   -SW         Exercise 6    Exercise Name 6  ITB stretch bilaterally   -SW      Reps 6  3  -SW      Time 6  30 sec  -SW        User Key  (r) = Recorded By, (t) = Taken By, (c) = Cosigned By    Initials Name Provider Type    Aarti Marino, PT DPT Physical Therapist           Manual Rx (last 36 hours)      Manual Treatments     Row Name 08/13/20 1700             Manual Rx 1    Manual Rx 1 Location  Manual ITB release R and L side  -SW      Manual Rx 1 Type  MFR and cupping   -SW        User Key  (r) = Recorded By, (t) = Taken By, (c) = Cosigned By    Initials Name Provider Type    Aarti Marino, PT DPT Physical Therapist                    PT OP Goals     Row Name 08/13/20 1600          PT Short Term Goals    STG Date to Achieve  08/20/20  -     STG 1  patient to be independent and compliant with HEP as directed 2x/day  -     STG 1 Progress  Met  -     STG 2  patient to report with improved symmetry to pelvic girdle in supine position such that no MET is required x 3 consecutive days.   -SW     STG 2 Progress  Progressing  -     STG 3  patient to report with dec pain along GTB L side such that palpational tenderness is reduced and not detected along lateral ITB of LLE, having been localized to GTB  -SW     STG 3 Progress  Met  -     STG 4  patient to report with pain mild no greater than 3/10 before or during or after work.  -     STG 4 Progress  Progressing  -     STG 5  Patient to demonstrate normal positioning for sleep as to protect spine and position for hip as to dec stress upon standing with less pain.  -     STG 5 Progress  Met  -        Long Term Goals    LTG Date to Achieve  10/02/20  -     LTG 1  subjectively improve s/s by 70% overall   -     LTG 1 Progress  New  -     LTG 2  patient to rate Mod Oswestry score of 10% disability rating or less indicating improved  daily function  -SW     LTG 2 Progress  New  -SW     LTG 3  No missed days of work due to pain limiting her tolerance to daily activities.  -     LTG 3 Progress  New  -        Time Calculation    PT Goal Re-Cert Due Date  08/20/20  -       User Key  (r) = Recorded By, (t) = Taken By, (c) = Cosigned By    Initials Name Provider Type    Aarti Marino, PT DPT Physical Therapist           Therapy Education  Given: HEP  Program: Reinforced, Progressed  How Provided: Verbal, Demonstration  Provided to: Patient  Level of Understanding: Teach back education performed, Verbalized, Demonstrated              Time Calculation:   Start Time: 1615  Stop Time: 1725  Time Calculation (min): 70 min  Therapy Charges for Today     Code Description Service Date Service Provider Modifiers Qty    71199257320 HC PT ULTRASOUND EA 15 MIN 8/13/2020 Aarti Kruse, PT DPT GP 1    37380679190 HC PT MANUAL THERAPY EA 15 MIN 8/13/2020 Aarti Kruse, PT DPT GP 2    [id number]  PT THER PROC EA 15 MIN 8/13/2020 Aarti Kruse, PT DPT GP 2                    Aarti Kruse, PT DPT  8/13/2020

## 2020-08-18 ENCOUNTER — HOSPITAL ENCOUNTER (OUTPATIENT)
Facility: HOSPITAL | Age: 30
Discharge: HOME | End: 2020-08-18
Attending: OBSTETRICS & GYNECOLOGY | Admitting: OBSTETRICS & GYNECOLOGY

## 2020-08-18 VITALS
OXYGEN SATURATION: 98 % | SYSTOLIC BLOOD PRESSURE: 119 MMHG | HEART RATE: 98 BPM | RESPIRATION RATE: 20 BRPM | DIASTOLIC BLOOD PRESSURE: 70 MMHG

## 2020-08-18 PROCEDURE — G0463 HOSPITAL OUTPT CLINIC VISIT: HCPCS

## 2020-08-18 PROCEDURE — 59025 FETAL NON-STRESS TEST: CPT | Performed by: OBSTETRICS & GYNECOLOGY

## 2020-08-18 PROCEDURE — 59025 FETAL NON-STRESS TEST: CPT

## 2020-08-18 NOTE — NON STRESS TEST
Lissa Vargas, a  at 33w0d with an BOLA of 10/6/2020, by Last Menstrual Period, was seen at [facility] DEL[facility] for a nonstress test.    Chief Complaint   Patient presents with   • Decreased Fetal Movement       Patient Active Problem List   Diagnosis   • Dysthymic disorder   • History of anxiety   • History of depression   • Obesity affecting pregnancy in third trimester   • Supervision of high risk pregnancy in third trimester   • Pregnancy related hip pain in third trimester, antepartum   • Rh negative status during pregnancy in third trimester       Start Time: 0840  Stop Time: 0900    Interpretation A  Nonstress Test Interpretation A: Reactive (20 0900 : Emma Yeung, RN)  Comments A: Reviewed with Jesse Gonzalez RNC (20 09 : Emma Yeung, RN)

## 2020-08-20 ENCOUNTER — APPOINTMENT (OUTPATIENT)
Dept: PHYSICAL THERAPY | Facility: HOSPITAL | Age: 30
End: 2020-08-20

## 2020-08-26 ENCOUNTER — ROUTINE PRENATAL (OUTPATIENT)
Dept: OBSTETRICS AND GYNECOLOGY | Facility: CLINIC | Age: 30
End: 2020-08-26

## 2020-08-26 VITALS — WEIGHT: 193 LBS | SYSTOLIC BLOOD PRESSURE: 110 MMHG | BODY MASS INDEX: 34.19 KG/M2 | DIASTOLIC BLOOD PRESSURE: 72 MMHG

## 2020-08-26 DIAGNOSIS — Z3A.34 34 WEEKS GESTATION OF PREGNANCY: Primary | ICD-10-CM

## 2020-08-26 DIAGNOSIS — M25.559 PREGNANCY RELATED HIP PAIN IN THIRD TRIMESTER, ANTEPARTUM: ICD-10-CM

## 2020-08-26 DIAGNOSIS — O09.93 SUPERVISION OF HIGH RISK PREGNANCY IN THIRD TRIMESTER: ICD-10-CM

## 2020-08-26 DIAGNOSIS — O26.893 PREGNANCY RELATED HIP PAIN IN THIRD TRIMESTER, ANTEPARTUM: ICD-10-CM

## 2020-08-26 DIAGNOSIS — O99.213 OBESITY AFFECTING PREGNANCY IN THIRD TRIMESTER: ICD-10-CM

## 2020-08-26 PROCEDURE — 0502F SUBSEQUENT PRENATAL CARE: CPT | Performed by: NURSE PRACTITIONER

## 2020-08-26 NOTE — PROGRESS NOTES
CC: Prenatal visit    Lissa Vargas is a 29 y.o.  at 34w1d.  Doing well.  Denies contractions, LOF, or VB.  Reports good FM.    /72   Wt 87.5 kg (193 lb)   LMP 2019 (Exact Date)   BMI 34.19 kg/m²   SVE: Deferred  Fundal Height (cm): 34 cm  Fetal Heart Rate: 160s     Problems (from 20 to present)     Problem Noted Resolved    Rh negative status during pregnancy in third trimester 7/15/2020 by Pattie Cook APRN No    Overview Signed 7/15/2020 11:52 AM by Pattie Cook APRN     Rhogam given 7/15/2020         Pregnancy related hip pain in third trimester, antepartum 2020 by Anusha Linares APRN No    Overview Addendum 2020 12:43 PM by Pattie Cook APRN     Seeing MINOO Broussard.          Supervision of high risk pregnancy in third trimester 3/26/2020 by Vida Post MD No    Overview Addendum 2020  4:19 PM by Pattie Cook APRN     O neg / Rubella immune / GBS at 36 weeks  Dating: LMP = 1TUS (10wk)  Genetics: Declined  Tdap: given 7/15/20  Flu: Declined  Anatomy: Anatomy normal in appearance, Boy-name is a secret  1h Glucola: passed 1 hr  Rhogam: given 7/15/20  H&H/Plts: 28wk  Lab Results   Component Value Date    WBC 13.73 (H) 07/15/2020    HGB 12.8 07/15/2020    HCT 36.7 07/15/2020    MCV 83.6 07/15/2020     07/15/2020     Breast/BC undecided  Spouse: Ashish         History of anxiety 2020 by Anusha Linares APRN No    Overview Signed 3/26/2020  8:59 AM by Vida Post MD     Was on Wellbutrin XL         History of depression 2020 by Anusha Linares APRN No    Overview Signed 3/26/2020  8:58 AM by Vida Post MD     Was on Zoloft         Obesity affecting pregnancy in third trimester 2020 by Anusha Linares APRN No    Overview Signed 3/26/2020  8:58 AM by Vida Post MD     Class I obesity, PG BMI 33  Early  garrett WNL               A/P: Lissa Vargas is a 29 y.o.  at 34w1d.  - PTL and FKCs precautions given  -  Centering session # 7/: Postpartum blues, depression, psychosis; SIDs,  immunizations and the first days of  reviewed.   - GBS swab at next visit  - RTC in 2 weeks for Centering  - Reviewed COVID-19 visitation policy  - Reviewed COVID-19 precautions     Diagnosis Plan   1. 34 weeks gestation of pregnancy     2. Supervision of high risk pregnancy in third trimester     3. Obesity affecting pregnancy in third trimester     4. Pregnancy related hip pain in third trimester, antepartum       Pattie Yu, APRN  2020  16:22

## 2020-08-27 ENCOUNTER — APPOINTMENT (OUTPATIENT)
Dept: PHYSICAL THERAPY | Facility: HOSPITAL | Age: 30
End: 2020-08-27

## 2020-09-09 ENCOUNTER — ROUTINE PRENATAL (OUTPATIENT)
Dept: OBSTETRICS AND GYNECOLOGY | Facility: CLINIC | Age: 30
End: 2020-09-09

## 2020-09-09 VITALS — SYSTOLIC BLOOD PRESSURE: 128 MMHG | BODY MASS INDEX: 34.54 KG/M2 | DIASTOLIC BLOOD PRESSURE: 82 MMHG | WEIGHT: 195 LBS

## 2020-09-09 DIAGNOSIS — Z36.85 ANTENATAL SCREENING FOR STREPTOCOCCUS B: ICD-10-CM

## 2020-09-09 DIAGNOSIS — Z3A.36 36 WEEKS GESTATION OF PREGNANCY: ICD-10-CM

## 2020-09-09 DIAGNOSIS — O09.93 SUPERVISION OF HIGH RISK PREGNANCY IN THIRD TRIMESTER: Primary | ICD-10-CM

## 2020-09-09 PROCEDURE — 0502F SUBSEQUENT PRENATAL CARE: CPT | Performed by: NURSE PRACTITIONER

## 2020-09-09 PROCEDURE — 87653 STREP B DNA AMP PROBE: CPT | Performed by: NURSE PRACTITIONER

## 2020-09-09 NOTE — PROGRESS NOTES
"CC: Prenatal visit    Lissa Vargas is a 29 y.o.  at 36w1d.  Doing well.  Denies contractions, LOF, or VB.  Reports good FM. Accompanied by her spouse, Ashish at Centering session today.     /82   Wt 88.5 kg (195 lb)   LMP 2019 (Exact Date)   BMI 34.54 kg/m²   SVE: Deferred  Fundal Height (cm): 35 cm  Fetal Heart Rate: 150s     Problems (from 20 to present)     Problem Noted Resolved    Rh negative status during pregnancy in third trimester 7/15/2020 by Pattie Cook APRN No    Overview Signed 7/15/2020 11:52 AM by Pattie Cook APRN     Rhogam given 7/15/2020         Pregnancy related hip pain in third trimester, antepartum 2020 by Anusha Linares APRN No    Overview Addendum 2020 12:43 PM by Pattie Cook APRN     Seeing MINOO Broussard.          Supervision of high risk pregnancy in third trimester 3/26/2020 by Vida Post MD No    Overview Addendum 2020 10:31 AM by Pattie Cook APRN     O neg / Rubella immune / GBS at 36 weeks  Dating: LMP = 1TUS (10wk)  Genetics: Declined  Tdap: given 7/15/20  Flu: Declined  Anatomy: Anatomy normal in appearance, Boy- \"Teddy\" Name is a SECRET from family  1h Glucola: passed 1 hr  Rhogam: given 7/15/20  H&H/Plts: 28wk  Lab Results   Component Value Date    WBC 13.73 (H) 07/15/2020    HGB 12.8 07/15/2020    HCT 36.7 07/15/2020    MCV 83.6 07/15/2020     07/15/2020     Breast/BC undecided  Spouse: Ashish         History of anxiety 2020 by Anusha Linares APRN No    Overview Signed 3/26/2020  8:59 AM by Vida Post MD     Was on Wellbutrin XL         History of depression 2020 by Anusha Linares APRN No    Overview Signed 3/26/2020  8:58 AM by Vida Post MD     Was on Zoloft         Obesity affecting pregnancy in third trimester 2020 by Anusha Linares APRN No    Overview Signed 3/26/2020  " 8:58 AM by Vida Post MD     Class I obesity, PG BMI 33  Early glucola WNL               A/P: Lissa Vargas is a 29 y.o.  at 36w1d.  -Centering session #8- Breastfeeding with SALOME Mena.   - GBS swab obtained  - PTL and FKC precautions given  - RTC in 1 weeks for MARLENI appt and 2 weeks on  for Centering session #9.   - Reviewed COVID-19 visitation policy  - Reviewed COVID-19 precautions     Diagnosis Plan   1. Supervision of high risk pregnancy in third trimester     2.  screening for streptococcus B  Group B Strep (Molecular) - Swab, Vaginal/Rectum   3. 36 weeks gestation of pregnancy       Pattie Yu, ANNABELLE  2020  10:33

## 2020-09-10 LAB — GROUP B STREP, DNA: NEGATIVE

## 2020-09-17 ENCOUNTER — ROUTINE PRENATAL (OUTPATIENT)
Dept: OBSTETRICS AND GYNECOLOGY | Facility: CLINIC | Age: 30
End: 2020-09-17

## 2020-09-17 VITALS — WEIGHT: 197 LBS | SYSTOLIC BLOOD PRESSURE: 126 MMHG | BODY MASS INDEX: 34.9 KG/M2 | DIASTOLIC BLOOD PRESSURE: 70 MMHG

## 2020-09-17 DIAGNOSIS — Z3A.37 37 WEEKS GESTATION OF PREGNANCY: ICD-10-CM

## 2020-09-17 DIAGNOSIS — Z67.91 RH NEGATIVE STATUS DURING PREGNANCY IN THIRD TRIMESTER: ICD-10-CM

## 2020-09-17 DIAGNOSIS — O26.893 PREGNANCY RELATED HIP PAIN IN THIRD TRIMESTER, ANTEPARTUM: ICD-10-CM

## 2020-09-17 DIAGNOSIS — O99.213 OBESITY AFFECTING PREGNANCY IN THIRD TRIMESTER: ICD-10-CM

## 2020-09-17 DIAGNOSIS — Z86.59 HISTORY OF DEPRESSION: ICD-10-CM

## 2020-09-17 DIAGNOSIS — M25.559 PREGNANCY RELATED HIP PAIN IN THIRD TRIMESTER, ANTEPARTUM: ICD-10-CM

## 2020-09-17 DIAGNOSIS — O09.93 SUPERVISION OF HIGH RISK PREGNANCY IN THIRD TRIMESTER: Primary | ICD-10-CM

## 2020-09-17 DIAGNOSIS — O26.893 RH NEGATIVE STATUS DURING PREGNANCY IN THIRD TRIMESTER: ICD-10-CM

## 2020-09-17 DIAGNOSIS — Z86.59 HISTORY OF ANXIETY: ICD-10-CM

## 2020-09-17 PROCEDURE — 0502F SUBSEQUENT PRENATAL CARE: CPT | Performed by: OBSTETRICS & GYNECOLOGY

## 2020-09-17 NOTE — PROGRESS NOTES
"CC: Prenatal visit    Lissa Vargas is a 29 y.o.  at 37w2d.  Doing well.  Denies contractions, LOF, or VB.  Reports good FM.    /70   Wt 89.4 kg (197 lb)   LMP 2019 (Exact Date)   BMI 34.90 kg/m²   SVE: Declined  Fundal Height (cm): 36 cm  Fetal Heart Rate: 152     Problems (from 20 to present)     Problem Noted Resolved    Rh negative status during pregnancy in third trimester 7/15/2020 by Pattie Cook APRN No    Overview Signed 7/15/2020 11:52 AM by Pattie Cook APRN     Rhogam given 7/15/2020         Pregnancy related hip pain in third trimester, antepartum 2020 by Anusha Linares APRN No    Overview Addendum 2020 12:43 PM by Pattie Cook APRN     Seeing MINOO Broussard.          Supervision of high risk pregnancy in third trimester 3/26/2020 by Vida Post MD No    Overview Addendum 2020 10:31 AM by Pattie Cook APRN     O neg / Rubella immune / GBS at 36 weeks  Dating: LMP = 1TUS (10wk)  Genetics: Declined  Tdap: given 7/15/20  Flu: Declined  Anatomy: Anatomy normal in appearance, Boy- \"Teddy\" Name is a SECRET from family  1h Glucola: passed 1 hr  Rhogam: given 7/15/20  H&H/Plts: 28wk  Lab Results   Component Value Date    WBC 13.73 (H) 07/15/2020    HGB 12.8 07/15/2020    HCT 36.7 07/15/2020    MCV 83.6 07/15/2020     07/15/2020     Breast/BC undecided  Spouse: Ashish         History of anxiety 2020 by Anusha Linares APRN No    Overview Signed 3/26/2020  8:59 AM by Vida Post MD     Was on Wellbutrin XL         History of depression 2020 by Anusha Linares APRN No    Overview Signed 3/26/2020  8:58 AM by Vida Post MD     Was on Zoloft         Obesity affecting pregnancy in third trimester 2020 by Anusha Linares APRN No    Overview Signed 3/26/2020  8:58 AM by Vida Post MD     Class I " obesity, PG BMI 33  Early glucola WNL             A/P: Lissa Vargas is a 29 y.o.  at 37w2d.  - RTC in 1 week  - Discussed EIOL vs IOL for late term.  She would like expectant management until late term 41 weeks.  Reviewed that when she comes in labor, it will be the provider who is on call who will deliver her baby and that I may not be there to deliver her baby.  She voices understanding.    - Reviewed birth plan with her; encouraged her to bring a copy to L&D  - Reviewed COVID-19 visitation policy  - Reviewed COVID-19 precautions     Diagnosis Plan   1. Supervision of high risk pregnancy in third trimester     2. Rh negative status during pregnancy in third trimester     3. Pregnancy related hip pain in third trimester, antepartum     4. Obesity affecting pregnancy in third trimester     5. History of anxiety     6. History of depression     7. 37 weeks gestation of pregnancy       Vida Post MD  2020  16:55 CDT

## 2020-09-23 ENCOUNTER — ROUTINE PRENATAL (OUTPATIENT)
Dept: OBSTETRICS AND GYNECOLOGY | Facility: CLINIC | Age: 30
End: 2020-09-23

## 2020-09-23 VITALS — WEIGHT: 196 LBS | SYSTOLIC BLOOD PRESSURE: 116 MMHG | BODY MASS INDEX: 34.72 KG/M2 | DIASTOLIC BLOOD PRESSURE: 72 MMHG

## 2020-09-23 DIAGNOSIS — O26.893 RH NEGATIVE STATUS DURING PREGNANCY IN THIRD TRIMESTER: ICD-10-CM

## 2020-09-23 DIAGNOSIS — Z86.59 HISTORY OF DEPRESSION: ICD-10-CM

## 2020-09-23 DIAGNOSIS — Z86.59 HISTORY OF ANXIETY: ICD-10-CM

## 2020-09-23 DIAGNOSIS — M25.559 PREGNANCY RELATED HIP PAIN IN THIRD TRIMESTER, ANTEPARTUM: ICD-10-CM

## 2020-09-23 DIAGNOSIS — O09.93 SUPERVISION OF HIGH RISK PREGNANCY IN THIRD TRIMESTER: ICD-10-CM

## 2020-09-23 DIAGNOSIS — O99.213 OBESITY AFFECTING PREGNANCY IN THIRD TRIMESTER: ICD-10-CM

## 2020-09-23 DIAGNOSIS — Z3A.38 38 WEEKS GESTATION OF PREGNANCY: Primary | ICD-10-CM

## 2020-09-23 DIAGNOSIS — Z67.91 RH NEGATIVE STATUS DURING PREGNANCY IN THIRD TRIMESTER: ICD-10-CM

## 2020-09-23 DIAGNOSIS — O26.893 PREGNANCY RELATED HIP PAIN IN THIRD TRIMESTER, ANTEPARTUM: ICD-10-CM

## 2020-09-23 PROCEDURE — 0502F SUBSEQUENT PRENATAL CARE: CPT | Performed by: NURSE PRACTITIONER

## 2020-09-23 NOTE — PROGRESS NOTES
"CC: Prenatal visit    Lissa Vargas is a 29 y.o.  at 38w1d.  Doing well.  Denies contractions, LOF, or VB.  Reports good FM. Is having some normal pregnancy discomforts of occasional stress incontinence and pelvic pressure.     /72   Wt 88.9 kg (196 lb)   LMP 2019 (Exact Date)   BMI 34.72 kg/m²   SVE: Declined  Fundal Height (cm): 38 cm  Fetal Heart Rate: 150 us   Vertex via bedside ultrasound     Problems (from 20 to present)     Problem Noted Resolved    Rh negative status during pregnancy in third trimester 7/15/2020 by Pattie Cook APRN No    Overview Signed 7/15/2020 11:52 AM by Pattie Cook APRN     Rhogam given 7/15/2020         Pregnancy related hip pain in third trimester, antepartum 2020 by Anusha Linares APRN No    Overview Addendum 2020 12:43 PM by Pattie Cook APRN     Seeing MINOO Broussard.          Supervision of high risk pregnancy in third trimester 3/26/2020 by Vida Post MD No    Overview Addendum 2020  8:54 AM by Pattie Cook APRN     O neg / Rubella immune / GBS NEGATIVE  Dating: LMP = 1TUS (10wk)  Genetics: Declined  Tdap: given 7/15/20  Flu: Declined  Anatomy: Anatomy normal in appearance, Boy- \"Teddy\" Name is a SECRET from family  1h Glucola: passed 1 hr  Rhogam: given 7/15/20  H&H/Plts: 28wk  Lab Results   Component Value Date    WBC 13.73 (H) 07/15/2020    HGB 12.8 07/15/2020    HCT 36.7 07/15/2020    MCV 83.6 07/15/2020     07/15/2020     Breast/BC undecided  Spouse: Ashish         History of anxiety 2020 by Anusha Linares APRN No    Overview Signed 3/26/2020  8:59 AM by Vida Post MD     Was on Wellbutrin XL         History of depression 2020 by Anusha Linares, ANNABELLE No    Overview Signed 3/26/2020  8:58 AM by Vida Post MD     Was on Zoloft         Obesity affecting pregnancy in third trimester 2020 " by Anusha Linares APRN No    Overview Signed 3/26/2020  8:58 AM by Vida Post MD     Class I obesity, PG BMI 33  Early glucola WNL               A/P: Lissa Vargas is a 29 y.o.  at 38w1d.  - Reviewed Labor and FKC precautions  - RTC in 1 weeks  - Reviewed COVID-19 visitation policy  - Reviewed COVID-19 precautions     Diagnosis Plan   1. 38 weeks gestation of pregnancy     2. Pregnancy related hip pain in third trimester, antepartum     3. Supervision of high risk pregnancy in third trimester     4. Obesity affecting pregnancy in third trimester     5. History of anxiety     6. History of depression     7. Rh negative status during pregnancy in third trimester       Pattie Yu, ANNABELLE  2020  09:06 CDT

## 2020-10-01 ENCOUNTER — ROUTINE PRENATAL (OUTPATIENT)
Dept: OBSTETRICS AND GYNECOLOGY | Facility: CLINIC | Age: 30
End: 2020-10-01

## 2020-10-01 VITALS — WEIGHT: 200 LBS | DIASTOLIC BLOOD PRESSURE: 62 MMHG | SYSTOLIC BLOOD PRESSURE: 112 MMHG | BODY MASS INDEX: 35.43 KG/M2

## 2020-10-01 DIAGNOSIS — Z3A.39 39 WEEKS GESTATION OF PREGNANCY: Primary | ICD-10-CM

## 2020-10-01 DIAGNOSIS — M25.559 PREGNANCY RELATED HIP PAIN IN THIRD TRIMESTER, ANTEPARTUM: ICD-10-CM

## 2020-10-01 DIAGNOSIS — O26.893 RH NEGATIVE STATUS DURING PREGNANCY IN THIRD TRIMESTER: ICD-10-CM

## 2020-10-01 DIAGNOSIS — Z86.59 HISTORY OF DEPRESSION: ICD-10-CM

## 2020-10-01 DIAGNOSIS — O09.93 SUPERVISION OF HIGH RISK PREGNANCY IN THIRD TRIMESTER: ICD-10-CM

## 2020-10-01 DIAGNOSIS — O26.893 PREGNANCY RELATED HIP PAIN IN THIRD TRIMESTER, ANTEPARTUM: ICD-10-CM

## 2020-10-01 DIAGNOSIS — O99.213 OBESITY AFFECTING PREGNANCY IN THIRD TRIMESTER: ICD-10-CM

## 2020-10-01 DIAGNOSIS — Z86.59 HISTORY OF ANXIETY: ICD-10-CM

## 2020-10-01 DIAGNOSIS — Z67.91 RH NEGATIVE STATUS DURING PREGNANCY IN THIRD TRIMESTER: ICD-10-CM

## 2020-10-01 PROCEDURE — 0502F SUBSEQUENT PRENATAL CARE: CPT | Performed by: NURSE PRACTITIONER

## 2020-10-01 NOTE — PROGRESS NOTES
"CC: Prenatal visit    Lissa Vargas is a 29 y.o.  at 39w2d.  Doing well.  No complaints.  Denies contractions, LOF, or VB.  Pt declines cervical check today.  Reports good FM.    /62   Wt 90.7 kg (200 lb)   LMP 2019 (Exact Date)   BMI 35.43 kg/m²              Problems (from 20 to present)     Problem Noted Resolved    Rh negative status during pregnancy in third trimester 7/15/2020 by Pattie Cook APRN No    Overview Signed 7/15/2020 11:52 AM by Pattie Cook APRN     Rhogam given 7/15/2020         Pregnancy related hip pain in third trimester, antepartum 2020 by Anusha Linares APRN No    Overview Addendum 2020 12:43 PM by Pattie Cook APRN Seeing Sandi, DPT.          Supervision of high risk pregnancy in third trimester 3/26/2020 by Vida Post MD No    Overview Addendum 2020  8:54 AM by Pattie Cook APRN     O neg / Rubella immune / GBS NEGATIVE  Dating: LMP = 1TUS (10wk)  Genetics: Declined  Tdap: given 7/15/20  Flu: Declined  Anatomy: Anatomy normal in appearance, Boy- \"Teddy\" Name is a SECRET from family  1h Glucola: passed 1 hr  Rhogam: given 7/15/20  H&H/Plts: 28wk  Lab Results   Component Value Date    WBC 13.73 (H) 07/15/2020    HGB 12.8 07/15/2020    HCT 36.7 07/15/2020    MCV 83.6 07/15/2020     07/15/2020     Breast/BC undecided  Spouse: Ashish         History of anxiety 2020 by Anusha Linares APRN No    Overview Signed 3/26/2020  8:59 AM by Vida Post MD     Was on Wellbutrin XL         History of depression 2020 by Anusha Linares APRN No    Overview Signed 3/26/2020  8:58 AM by Vida Post MD     Was on Zoloft         Obesity affecting pregnancy in third trimester 2020 by Anusha Linares APRN No    Overview Signed 3/26/2020  8:58 AM by Vida Post MD     Class I obesity, PG BMI " 33  Early glucola WNL               A/P: Lissa Vargas is a 29 y.o.  at 39w2d.  - RTC in 1 weeks for MARLENI appt or sooner if needed      Diagnosis Plan   1. 39 weeks gestation of pregnancy     2. Supervision of high risk pregnancy in third trimester     3. Rh negative status during pregnancy in third trimester     4. Pregnancy related hip pain in third trimester, antepartum     5. Obesity affecting pregnancy in third trimester     6. History of anxiety     7. History of depression         Anusha Linares, ANNABELLE  10/1/2020  15:50 CDT

## 2020-10-08 ENCOUNTER — HOSPITAL ENCOUNTER (INPATIENT)
Facility: HOSPITAL | Age: 30
LOS: 3 days | Discharge: HOME | End: 2020-10-11
Attending: OBSTETRICS & GYNECOLOGY | Admitting: FAMILY MEDICINE

## 2020-10-08 ENCOUNTER — ROUTINE PRENATAL (OUTPATIENT)
Dept: OBSTETRICS AND GYNECOLOGY | Facility: CLINIC | Age: 30
End: 2020-10-08

## 2020-10-08 VITALS — SYSTOLIC BLOOD PRESSURE: 128 MMHG | WEIGHT: 199.6 LBS | BODY MASS INDEX: 35.36 KG/M2 | DIASTOLIC BLOOD PRESSURE: 78 MMHG

## 2020-10-08 DIAGNOSIS — O26.893 RH NEGATIVE STATUS DURING PREGNANCY IN THIRD TRIMESTER: ICD-10-CM

## 2020-10-08 DIAGNOSIS — Z86.59 HISTORY OF DEPRESSION: ICD-10-CM

## 2020-10-08 DIAGNOSIS — M25.559 PREGNANCY RELATED HIP PAIN IN THIRD TRIMESTER, ANTEPARTUM: ICD-10-CM

## 2020-10-08 DIAGNOSIS — Z86.59 HISTORY OF ANXIETY: ICD-10-CM

## 2020-10-08 DIAGNOSIS — O09.93 SUPERVISION OF HIGH RISK PREGNANCY IN THIRD TRIMESTER: Primary | ICD-10-CM

## 2020-10-08 DIAGNOSIS — O26.893 PREGNANCY RELATED HIP PAIN IN THIRD TRIMESTER, ANTEPARTUM: ICD-10-CM

## 2020-10-08 DIAGNOSIS — Z67.91 RH NEGATIVE STATUS DURING PREGNANCY IN THIRD TRIMESTER: ICD-10-CM

## 2020-10-08 DIAGNOSIS — O99.213 OBESITY AFFECTING PREGNANCY IN THIRD TRIMESTER: ICD-10-CM

## 2020-10-08 DIAGNOSIS — Z3A.40 40 WEEKS GESTATION OF PREGNANCY: ICD-10-CM

## 2020-10-08 PROBLEM — Z34.90 NORMAL PREGNANCY: Status: ACTIVE | Noted: 2020-10-08

## 2020-10-08 PROCEDURE — 0502F SUBSEQUENT PRENATAL CARE: CPT | Performed by: OBSTETRICS & GYNECOLOGY

## 2020-10-08 RX ORDER — SODIUM CHLORIDE, SODIUM LACTATE, POTASSIUM CHLORIDE, CALCIUM CHLORIDE 600; 310; 30; 20 MG/100ML; MG/100ML; MG/100ML; MG/100ML
INJECTION, SOLUTION INTRAVENOUS
Status: COMPLETED
Start: 2020-10-08 | End: 2020-10-08

## 2020-10-08 RX ORDER — SODIUM CHLORIDE 0.9 % (FLUSH) 0.9 %
3 SYRINGE (ML) INJECTION EVERY 12 HOURS SCHEDULED
Status: DISCONTINUED | OUTPATIENT
Start: 2020-10-09 | End: 2020-10-09 | Stop reason: HOSPADM

## 2020-10-08 RX ORDER — BUTORPHANOL TARTRATE 1 MG/ML
1 INJECTION, SOLUTION INTRAMUSCULAR; INTRAVENOUS
Status: DISCONTINUED | OUTPATIENT
Start: 2020-10-08 | End: 2020-10-09 | Stop reason: HOSPADM

## 2020-10-08 RX ORDER — PROMETHAZINE HYDROCHLORIDE 12.5 MG/1
12.5 SUPPOSITORY RECTAL EVERY 6 HOURS PRN
Status: DISCONTINUED | OUTPATIENT
Start: 2020-10-08 | End: 2020-10-09 | Stop reason: HOSPADM

## 2020-10-08 RX ORDER — SODIUM CHLORIDE, SODIUM LACTATE, POTASSIUM CHLORIDE, CALCIUM CHLORIDE 600; 310; 30; 20 MG/100ML; MG/100ML; MG/100ML; MG/100ML
125 INJECTION, SOLUTION INTRAVENOUS CONTINUOUS
Status: DISCONTINUED | OUTPATIENT
Start: 2020-10-09 | End: 2020-10-09

## 2020-10-08 RX ORDER — PROMETHAZINE HYDROCHLORIDE 12.5 MG/1
12.5 TABLET ORAL EVERY 6 HOURS PRN
Status: DISCONTINUED | OUTPATIENT
Start: 2020-10-08 | End: 2020-10-09 | Stop reason: HOSPADM

## 2020-10-08 RX ORDER — SODIUM CHLORIDE 0.9 % (FLUSH) 0.9 %
3-10 SYRINGE (ML) INJECTION AS NEEDED
Status: DISCONTINUED | OUTPATIENT
Start: 2020-10-08 | End: 2020-10-09 | Stop reason: HOSPADM

## 2020-10-08 RX ORDER — LIDOCAINE HYDROCHLORIDE 10 MG/ML
5 INJECTION, SOLUTION EPIDURAL; INFILTRATION; INTRACAUDAL; PERINEURAL AS NEEDED
Status: DISCONTINUED | OUTPATIENT
Start: 2020-10-08 | End: 2020-10-09 | Stop reason: HOSPADM

## 2020-10-08 RX ADMIN — SODIUM CHLORIDE, POTASSIUM CHLORIDE, SODIUM LACTATE AND CALCIUM CHLORIDE 125 ML/HR: 600; 310; 30; 20 INJECTION, SOLUTION INTRAVENOUS at 23:50

## 2020-10-09 ENCOUNTER — ANESTHESIA EVENT (OUTPATIENT)
Dept: LABOR AND DELIVERY | Facility: HOSPITAL | Age: 30
End: 2020-10-09

## 2020-10-09 ENCOUNTER — ANESTHESIA (OUTPATIENT)
Dept: LABOR AND DELIVERY | Facility: HOSPITAL | Age: 30
End: 2020-10-09

## 2020-10-09 PROBLEM — Z34.90 NORMAL PREGNANCY: Status: RESOLVED | Noted: 2020-10-08 | Resolved: 2020-10-09

## 2020-10-09 PROBLEM — Z3A.40 40 WEEKS GESTATION OF PREGNANCY: Status: RESOLVED | Noted: 2020-10-09 | Resolved: 2020-10-09

## 2020-10-09 PROBLEM — Z37.9 NORMAL LABOR: Status: ACTIVE | Noted: 2020-10-09

## 2020-10-09 PROBLEM — O99.213 OBESITY AFFECTING PREGNANCY IN THIRD TRIMESTER: Status: RESOLVED | Noted: 2020-02-27 | Resolved: 2020-10-09

## 2020-10-09 PROBLEM — Z3A.40 40 WEEKS GESTATION OF PREGNANCY: Status: ACTIVE | Noted: 2020-10-09

## 2020-10-09 PROBLEM — Z37.9 NORMAL LABOR: Status: RESOLVED | Noted: 2020-10-09 | Resolved: 2020-10-09

## 2020-10-09 LAB
ABO GROUP BLD: NORMAL
AMPHET+METHAMPHET UR QL: NEGATIVE
AMPHETAMINES UR QL: NEGATIVE
BARBITURATES UR QL SCN: NEGATIVE
BENZODIAZ UR QL SCN: NEGATIVE
BLD GP AB SCN SERPL QL: NEGATIVE
BUPRENORPHINE SERPL-MCNC: NEGATIVE NG/ML
CANNABINOIDS SERPL QL: NEGATIVE
COCAINE UR QL: NEGATIVE
DEPRECATED RDW RBC AUTO: 41.2 FL (ref 37–54)
ERYTHROCYTE [DISTWIDTH] IN BLOOD BY AUTOMATED COUNT: 14.3 % (ref 12.3–15.4)
FETAL BLEED: NEGATIVE
HCT VFR BLD AUTO: 35.5 % (ref 34–46.6)
HGB BLD-MCNC: 12 G/DL (ref 12–15.9)
Lab: NORMAL
MCH RBC QN AUTO: 27.3 PG (ref 26.6–33)
MCHC RBC AUTO-ENTMCNC: 33.8 G/DL (ref 31.5–35.7)
MCV RBC AUTO: 80.9 FL (ref 79–97)
METHADONE UR QL SCN: NEGATIVE
NUMBER OF DOSES: NORMAL
OPIATES UR QL: NEGATIVE
OXYCODONE UR QL SCN: NEGATIVE
PCP UR QL SCN: NEGATIVE
PLATELET # BLD AUTO: 260 10*3/MM3 (ref 140–450)
PMV BLD AUTO: 9.2 FL (ref 6–12)
PROPOXYPH UR QL: NEGATIVE
RBC # BLD AUTO: 4.39 10*6/MM3 (ref 3.77–5.28)
RH BLD: NEGATIVE
T&S EXPIRATION DATE: NORMAL
TRICYCLICS UR QL SCN: NEGATIVE
WBC # BLD AUTO: 15.73 10*3/MM3 (ref 3.4–10.8)

## 2020-10-09 PROCEDURE — 80306 DRUG TEST PRSMV INSTRMNT: CPT | Performed by: OBSTETRICS & GYNECOLOGY

## 2020-10-09 PROCEDURE — 85027 COMPLETE CBC AUTOMATED: CPT | Performed by: OBSTETRICS & GYNECOLOGY

## 2020-10-09 PROCEDURE — 51703 INSERT BLADDER CATH COMPLEX: CPT

## 2020-10-09 PROCEDURE — 59400 OBSTETRICAL CARE: CPT | Performed by: FAMILY MEDICINE

## 2020-10-09 PROCEDURE — 25010000002 ONDANSETRON PER 1 MG: Performed by: NURSE ANESTHETIST, CERTIFIED REGISTERED

## 2020-10-09 PROCEDURE — 4A1HX4Z MONITORING OF PRODUCTS OF CONCEPTION, CARDIAC ELECTRICAL ACTIVITY, EXTERNAL APPROACH: ICD-10-PCS | Performed by: FAMILY MEDICINE

## 2020-10-09 PROCEDURE — 86900 BLOOD TYPING SEROLOGIC ABO: CPT | Performed by: OBSTETRICS & GYNECOLOGY

## 2020-10-09 PROCEDURE — 85461 HEMOGLOBIN FETAL: CPT | Performed by: FAMILY MEDICINE

## 2020-10-09 PROCEDURE — 0KQM0ZZ REPAIR PERINEUM MUSCLE, OPEN APPROACH: ICD-10-PCS | Performed by: FAMILY MEDICINE

## 2020-10-09 PROCEDURE — C1755 CATHETER, INTRASPINAL: HCPCS

## 2020-10-09 PROCEDURE — 86901 BLOOD TYPING SEROLOGIC RH(D): CPT | Performed by: OBSTETRICS & GYNECOLOGY

## 2020-10-09 PROCEDURE — C1755 CATHETER, INTRASPINAL: HCPCS | Performed by: NURSE ANESTHETIST, CERTIFIED REGISTERED

## 2020-10-09 PROCEDURE — 86850 RBC ANTIBODY SCREEN: CPT | Performed by: OBSTETRICS & GYNECOLOGY

## 2020-10-09 PROCEDURE — 25010000002 FENTANYL CITRATE (PF) 100 MCG/2ML SOLUTION: Performed by: NURSE ANESTHETIST, CERTIFIED REGISTERED

## 2020-10-09 PROCEDURE — 25010000003 LIDOCAINE 1 % SOLUTION

## 2020-10-09 RX ORDER — LANOLIN 100 %
OINTMENT (GRAM) TOPICAL
Status: DISCONTINUED | OUTPATIENT
Start: 2020-10-09 | End: 2020-10-11 | Stop reason: HOSPADM

## 2020-10-09 RX ORDER — BISACODYL 10 MG
10 SUPPOSITORY, RECTAL RECTAL DAILY PRN
Status: DISCONTINUED | OUTPATIENT
Start: 2020-10-10 | End: 2020-10-11 | Stop reason: HOSPADM

## 2020-10-09 RX ORDER — MISOPROSTOL 200 UG/1
800 TABLET ORAL AS NEEDED
Status: DISCONTINUED | OUTPATIENT
Start: 2020-10-09 | End: 2020-10-09 | Stop reason: HOSPADM

## 2020-10-09 RX ORDER — METHYLERGONOVINE MALEATE 0.2 MG/ML
200 INJECTION INTRAVENOUS ONCE AS NEEDED
Status: DISCONTINUED | OUTPATIENT
Start: 2020-10-09 | End: 2020-10-09 | Stop reason: HOSPADM

## 2020-10-09 RX ORDER — OXYTOCIN/0.9 % SODIUM CHLORIDE 30/500 ML
650 PLASTIC BAG, INJECTION (ML) INTRAVENOUS ONCE
Status: COMPLETED | OUTPATIENT
Start: 2020-10-09 | End: 2020-10-09

## 2020-10-09 RX ORDER — HYDROCORTISONE 25 MG/G
1 CREAM TOPICAL AS NEEDED
Status: DISCONTINUED | OUTPATIENT
Start: 2020-10-09 | End: 2020-10-11 | Stop reason: HOSPADM

## 2020-10-09 RX ORDER — FENTANYL CITRATE 50 UG/ML
INJECTION, SOLUTION INTRAMUSCULAR; INTRAVENOUS AS NEEDED
Status: DISCONTINUED | OUTPATIENT
Start: 2020-10-09 | End: 2020-10-09 | Stop reason: SURG

## 2020-10-09 RX ORDER — ONDANSETRON 2 MG/ML
INJECTION INTRAMUSCULAR; INTRAVENOUS AS NEEDED
Status: DISCONTINUED | OUTPATIENT
Start: 2020-10-09 | End: 2020-10-09 | Stop reason: SURG

## 2020-10-09 RX ORDER — OXYTOCIN/0.9 % SODIUM CHLORIDE 30/500 ML
85 PLASTIC BAG, INJECTION (ML) INTRAVENOUS ONCE
Status: COMPLETED | OUTPATIENT
Start: 2020-10-09 | End: 2020-10-09

## 2020-10-09 RX ORDER — ONDANSETRON 4 MG/1
4 TABLET, FILM COATED ORAL EVERY 8 HOURS PRN
Status: DISCONTINUED | OUTPATIENT
Start: 2020-10-09 | End: 2020-10-11 | Stop reason: HOSPADM

## 2020-10-09 RX ORDER — LIDOCAINE HYDROCHLORIDE 10 MG/ML
5 INJECTION, SOLUTION EPIDURAL; INFILTRATION; INTRACAUDAL; PERINEURAL ONCE
Status: COMPLETED | OUTPATIENT
Start: 2020-10-09 | End: 2020-10-09

## 2020-10-09 RX ORDER — SODIUM CHLORIDE 0.9 % (FLUSH) 0.9 %
1-10 SYRINGE (ML) INJECTION AS NEEDED
Status: DISCONTINUED | OUTPATIENT
Start: 2020-10-09 | End: 2020-10-11 | Stop reason: HOSPADM

## 2020-10-09 RX ORDER — CARBOPROST TROMETHAMINE 250 UG/ML
250 INJECTION, SOLUTION INTRAMUSCULAR AS NEEDED
Status: DISCONTINUED | OUTPATIENT
Start: 2020-10-09 | End: 2020-10-09 | Stop reason: HOSPADM

## 2020-10-09 RX ORDER — PRENATAL VIT/IRON FUM/FOLIC AC 27MG-0.8MG
1 TABLET ORAL DAILY
Status: DISCONTINUED | OUTPATIENT
Start: 2020-10-09 | End: 2020-10-11 | Stop reason: HOSPADM

## 2020-10-09 RX ORDER — OXYTOCIN/0.9 % SODIUM CHLORIDE 30/500 ML
2-20 PLASTIC BAG, INJECTION (ML) INTRAVENOUS
Status: DISCONTINUED | OUTPATIENT
Start: 2020-10-09 | End: 2020-10-09 | Stop reason: HOSPADM

## 2020-10-09 RX ORDER — EPHEDRINE SULFATE 50 MG/ML
INJECTION, SOLUTION INTRAVENOUS AS NEEDED
Status: DISCONTINUED | OUTPATIENT
Start: 2020-10-09 | End: 2020-10-09 | Stop reason: SURG

## 2020-10-09 RX ORDER — MISOPROSTOL 200 UG/1
800 TABLET ORAL AS NEEDED
Status: DISCONTINUED | OUTPATIENT
Start: 2020-10-09 | End: 2020-10-09

## 2020-10-09 RX ORDER — LIDOCAINE HYDROCHLORIDE 10 MG/ML
INJECTION, SOLUTION INFILTRATION; PERINEURAL AS NEEDED
Status: DISCONTINUED | OUTPATIENT
Start: 2020-10-09 | End: 2020-10-09 | Stop reason: SURG

## 2020-10-09 RX ORDER — DOCUSATE SODIUM 100 MG/1
100 CAPSULE, LIQUID FILLED ORAL 2 TIMES DAILY
Status: DISCONTINUED | OUTPATIENT
Start: 2020-10-09 | End: 2020-10-11 | Stop reason: HOSPADM

## 2020-10-09 RX ORDER — ACETAMINOPHEN 500 MG
1000 TABLET ORAL EVERY 6 HOURS
Status: DISCONTINUED | OUTPATIENT
Start: 2020-10-09 | End: 2020-10-11 | Stop reason: HOSPADM

## 2020-10-09 RX ORDER — OXYTOCIN/0.9 % SODIUM CHLORIDE 30/500 ML
PLASTIC BAG, INJECTION (ML) INTRAVENOUS
Status: COMPLETED
Start: 2020-10-09 | End: 2020-10-09

## 2020-10-09 RX ADMIN — FENTANYL CITRATE 100 MCG: 50 INJECTION, SOLUTION INTRAMUSCULAR; INTRAVENOUS at 11:37

## 2020-10-09 RX ADMIN — Medication 2 MILLI-UNITS/MIN: at 05:05

## 2020-10-09 RX ADMIN — LIDOCAINE HYDROCHLORIDE 5 ML: 10 INJECTION, SOLUTION EPIDURAL; INFILTRATION; INTRACAUDAL; PERINEURAL at 11:36

## 2020-10-09 RX ADMIN — LIDOCAINE HYDROCHLORIDE 5 ML: 10 INJECTION, SOLUTION EPIDURAL; INFILTRATION; INTRACAUDAL; PERINEURAL at 09:28

## 2020-10-09 RX ADMIN — OXYTOCIN-SODIUM CHLORIDE 0.9% IV SOLN 30 UNIT/500ML 650 ML/HR: 30-0.9/5 SOLUTION at 13:23

## 2020-10-09 RX ADMIN — EPHEDRINE SULFATE 12.5 MG: 50 INJECTION INTRAVENOUS at 05:39

## 2020-10-09 RX ADMIN — ACETAMINOPHEN 1000 MG: 500 TABLET ORAL at 15:32

## 2020-10-09 RX ADMIN — OXYTOCIN-SODIUM CHLORIDE 0.9% IV SOLN 30 UNIT/500ML 85 ML/HR: 30-0.9/5 SOLUTION at 15:36

## 2020-10-09 RX ADMIN — LIDOCAINE HYDROCHLORIDE 8 ML: 10 INJECTION, SOLUTION EPIDURAL; INFILTRATION; INTRACAUDAL; PERINEURAL at 04:39

## 2020-10-09 RX ADMIN — ONDANSETRON HYDROCHLORIDE 4 MG: 2 INJECTION INTRAMUSCULAR; INTRAVENOUS at 05:39

## 2020-10-09 RX ADMIN — MISOPROSTOL 800 MCG: 200 TABLET ORAL at 13:51

## 2020-10-09 RX ADMIN — SODIUM CHLORIDE, POTASSIUM CHLORIDE, SODIUM LACTATE AND CALCIUM CHLORIDE 125 ML/HR: 600; 310; 30; 20 INJECTION, SOLUTION INTRAVENOUS at 04:51

## 2020-10-09 RX ADMIN — LIDOCAINE HYDROCHLORIDE 5 ML: 10 INJECTION, SOLUTION EPIDURAL; INFILTRATION; INTRACAUDAL; PERINEURAL at 13:37

## 2020-10-09 RX ADMIN — SODIUM CHLORIDE, POTASSIUM CHLORIDE, SODIUM LACTATE AND CALCIUM CHLORIDE 125 ML/HR: 600; 310; 30; 20 INJECTION, SOLUTION INTRAVENOUS at 08:33

## 2020-10-09 RX ADMIN — Medication 10 ML/HR: at 04:45

## 2020-10-09 RX ADMIN — LIDOCAINE HYDROCHLORIDE 5 ML: 10 INJECTION, SOLUTION INFILTRATION; PERINEURAL at 09:29

## 2020-10-09 RX ADMIN — DOCUSATE SODIUM 100 MG: 100 CAPSULE, LIQUID FILLED ORAL at 21:55

## 2020-10-09 RX ADMIN — OXYTOCIN-SODIUM CHLORIDE 0.9% IV SOLN 30 UNIT/500ML 2 MILLI-UNITS/MIN: 30-0.9/5 SOLUTION at 05:05

## 2020-10-09 RX ADMIN — EPHEDRINE SULFATE 12.5 MG: 50 INJECTION INTRAVENOUS at 05:35

## 2020-10-09 RX ADMIN — FENTANYL CITRATE 100 MCG: 50 INJECTION, SOLUTION INTRAMUSCULAR; INTRAVENOUS at 04:39

## 2020-10-09 RX ADMIN — ACETAMINOPHEN 1000 MG: 500 TABLET ORAL at 21:55

## 2020-10-09 NOTE — ANESTHESIA PROCEDURE NOTES
Labor Epidural      Patient reassessed immediately prior to procedure    Start Time: 10/9/2020 4:30 AM  Performed By  CRNA: Nancy Camara CRNA  Preanesthetic Checklist  Completed: patient identified, site marked, surgical consent, pre-op evaluation, timeout performed, IV checked, risks and benefits discussed and monitors and equipment checked  Additional Notes  Easy epidural  Prep:  Pt Position:sitting  Sterile Tech:cap, gloves, mask and sterile barrier  Prep:povidone-iodine 7.5% surgical scrub  Monitoring:blood pressure monitoring and continuous pulse oximetry  Epidural Block Procedure:  Approach:midline  Guidance:landmark technique  Location:L3-L4  Needle Type:Tuohy  Needle Gauge:17 G  Loss of Resistance Medium: saline  Loss of Resistance: 8cm  Cath Depth at skin:10 cm  Paresthesia: none  Aspiration:negative  Test Dose:negative  Med administered at 10/9/2020 4:34 AM  Number of Attempts: 1  Post Assessment:  Dressing:occlusive dressing applied and secured with tape  Pt Tolerance:patient tolerated the procedure well with no apparent complications  Complications:no

## 2020-10-09 NOTE — L&D DELIVERY NOTE
[facility]  Vaginal Delivery Note    Delivery     Delivery: Vaginal, Spontaneous     YOB: 2019    Time of Birth:  Gestational Age 1:16 PM   40w3d     Anesthesia: Epidural     Delivering clinician: Elizabeth Ritter    Forceps?   No   Vacuum? No    Shoulder dystocia present: No        Delivery narrative:  On 10/9/2020 Lissa Vargas at , delivered a viable Male  infant to a sterile field with Epidural  anesthesia from MORENA position. Left anterior shoulder delivered without difficulty followed by right posterior shoulder.  Body was delivered with gentle traction, infant placed on mother's abdomen, WPDS (warmed, positioned, dried, stimulated), bulb suctioned. Cord clamped and cut after 1 minute of delayed clamping. Cord blood collected. Placenta with 3 vessel cord delivered spontaneous intact. Pitocin given. Mild uterine atony noted, prophylactic Cytotec 800mcg SL given. Inspection revealed a 2nd  degree perineal laceration repaired in the usual fashion with a running suture of 3-0 Vicryl and a 1st degree right labial laceration repaired with a running suture of 3-0 Vicryl.   mL. Infants weight 6 lb 3.8 oz (2830 g) . Apgars were 8  and 9  at one and five minutes, respectively. Infant and mother to recover in room.         Infant    Findings: male  infant     Infant observations: Weight: 2830 g (6 lb 3.8 oz)   Length: 19.25  in  Observations/Comments:        Apgars: 8  @ 1 minute /    9  @ 5 minutes   Infant Name: Teddy      Placenta, Cord, and Fluid    Placenta delivered  Spontaneous  at   10/9/2020  1:23 PM     Cord: 3 vessels  present.   Nuchal Cord?  no   Cord blood obtained: Yes    Cord gases obtained:  No    Cord gas results: Venous:  No results found for: PHCVEN    Arterial:  No results found for: PHCART     Repair    Episiotomy: None     No    Lacerations: Yes  Laceration Information  Laceration Repaired?   Perineal: 2nd  Yes    Periurethral:       Labial: right  Yes     Sulcus:       Vaginal:       Cervical:         Suture used for repair: 3-0 Vicryl   Estimated Blood Loss: Est. Blood Loss (mL): 250 mL(Filed from Delivery Summary) (10/09/20 1316)           Complications  none    Disposition  Mother to Mother Baby/Postpartum  in stable condition currently.  Baby to remains with mom  in stable condition currently.      Elizabeth Ritter MD  10/09/20  14:17 CDT

## 2020-10-09 NOTE — PLAN OF CARE
Goal Outcome Evaluation:  Plan of Care Reviewed With: patient, spouse  Progress: improving  Outcome Summary: bp's wnl. elevated heart rate r/t fever after receiving Cytotec 800mcg sublingual in delivery. ff -1 light bleeding. ambulates. voiding independently. vas 0. pitocin infusing at 85 ml/hr until 1751. ice pack, tucks pads, dermabond to perineum. pericare with RN assist.

## 2020-10-09 NOTE — ANESTHESIA PREPROCEDURE EVALUATION
Anesthesia Evaluation     Patient summary reviewed and Nursing notes reviewed                Airway   Mallampati: I  TM distance: >3 FB  Neck ROM: full  No difficulty expected  Dental - normal exam     Pulmonary - negative pulmonary ROS and normal exam   Cardiovascular - negative cardio ROS and normal exam        Neuro/Psych- negative ROS  GI/Hepatic/Renal/Endo - negative ROS   (-)  obesity, morbid obesity    Musculoskeletal (-) negative ROS    Abdominal  - normal exam   Substance History - negative use     OB/GYN    (+) Pregnant,         Other                        Anesthesia Plan    ASA 2     epidural       Anesthetic plan, all risks, benefits, and alternatives have been provided, discussed and informed consent has been obtained with: patient.

## 2020-10-09 NOTE — PROGRESS NOTES
"CC: Prenatal visit    Lissa Vargas is a 29 y.o.  at 40w3d.  Doing well.  Denies contractions, LOF, or VB.  Reports good FM.    /78   Wt 90.5 kg (199 lb 9.6 oz)   LMP 2019 (Exact Date)   BMI 35.36 kg/m²   SVE: 1-2/70/-2/soft/posterior, vertex; membranes swept     Fetal Heart Rate: 152     Problems (from 20 to present)     Problem Noted Resolved    Rh negative status during pregnancy in third trimester 7/15/2020 by Pattie Cook APRN No    Overview Signed 7/15/2020 11:52 AM by Pattie Cook APRN     Rhogam given 7/15/2020         Pregnancy related hip pain in third trimester, antepartum 2020 by Anusha Linares APRN No    Overview Addendum 2020 12:43 PM by Pattie Cook APRN     Seeing MINOO Broussard.          Supervision of high risk pregnancy in third trimester 3/26/2020 by Vida Post MD No    Overview Addendum 2020  8:54 AM by Pattie Cook APRN     O neg / Rubella immune / GBS NEGATIVE  Dating: LMP = 1TUS (10wk)  Genetics: Declined  Tdap: given 7/15/20  Flu: Declined  Anatomy: Anatomy normal in appearance, Boy- \"Teddy\" Name is a SECRET from family  1h Glucola: passed 1 hr  Rhogam: given 7/15/20  H&H/Plts: 28wk  Lab Results   Component Value Date    WBC 13.73 (H) 07/15/2020    HGB 12.8 07/15/2020    HCT 36.7 07/15/2020    MCV 83.6 07/15/2020     07/15/2020     Breast/BC undecided  Spouse: Ashish         History of anxiety 2020 by Anusha Linares APRN No    Overview Signed 3/26/2020  8:59 AM by Vida Post MD     Was on Wellbutrin XL         History of depression 2020 by Anusha Linares APRN No    Overview Signed 3/26/2020  8:58 AM by Vida Post MD     Was on Zoloft         Obesity affecting pregnancy in third trimester 2020 by Anusha Linares APRN 10/9/2020 by Elizabeth Ritter MD    Overview Signed 3/26/2020  " 8:58 AM by Vida Post MD     Class I obesity, PG BMI 33  Early glucola WNL             A/P: Lissa Vargas is a 29 y.o.  at 40w3d.  - IOL scheduled for 40w6d on 10/12  - Reviewed COVID-19 visitation policy  - Reviewed COVID-19 precautions     Diagnosis Plan   1. Supervision of high risk pregnancy in third trimester     2. Rh negative status during pregnancy in third trimester     3. Pregnancy related hip pain in third trimester, antepartum     4. Obesity affecting pregnancy in third trimester     5. History of anxiety     6. History of depression     7. 40 weeks gestation of pregnancy       Vida Post MD  10/9/2020  14:43 CDT

## 2020-10-09 NOTE — H&P
[facility]  Obstetric History and Physical    Chief Complaint   Patient presents with   • Contractions     started at 3pm, average 6mins apart, have got stronger and closer, denies leaking of fluid, postive fetal movement, denies vaginal bleeding.            Patient is a 29 y.o. female  currently at 40w2d, who presents with complaint of contractions that have been getting stronger and closer together throughout the evening.  Patient states positive fetal movement denies loss of fluid or vaginal bleeding at this time.  No other concerns or complaints at this time.    Her prenatal care is complicated by Rh- status, anxiety and depression     Obstetric History   # 1 - Date: None, Sex: None, Weight: None, GA: None, Delivery: None, Apgar1: None, Apgar5: None, Living: None, Birth Comments: None       The following portions of the patients history were reviewed and updated as appropriate: current medications, allergies, past medical history, past surgical history, past family history, past social history and problem list .       Prenatal Information:  Prenatal Results     POC Urine Glucose/Protein     Test Value Reference Range Date Time    Urine Glucose        Urine Protein              Initial Prenatal Labs     Test Value Reference Range Date Time    Hemoglobin 13.2 g/dL 12.0 - 15.9 20 1635    Hematocrit 39.6 % 34.0 - 46.6 20 1635    Platelets 252 10*3/mm3 140 - 450 07/15/20 0915      291 10*3/mm3 140 - 450 20 1635    Rubella IgG Positive   20 1635    Hepatitis B SAg Non-Reactive  Non-Reactive 20 1635    Hepatitis C Ab Non-Reactive  Non-Reactive 20 1635    RPR Non-Reactive  Non-Reactive 20 1635    ABO O   20 1635    Rh Negative   20 1635    Antibody Screen Negative   20 1635    HIV Non-Reactive  Non-Reactive 20 1635    Urine Culture No growth   20 1601    Gonorrhea Negative  Negative 20 1530    Chlamydia Negative  Negative 20  1530    TSH 2.930 uIU/mL 0.270 - 4.200 02/03/20 1635          2nd and 3rd Trimester     Test Value Reference Range Date Time    Hemoglobin (repeated) 12.8 g/dL 12.0 - 15.9 07/15/20 0915    Hematocrit (repeated) 36.7 % 34.0 - 46.6 07/15/20 0915    GCT 82 mg/dL 60 - 140 07/15/20 0915      97 mg/dL 60 - 140 03/10/20 0935    Antibody Screen (repeated) Negative   07/15/20 0915    GTT Fasting        GTT 1 Hr        GTT 2 Hr        GTT 3 Hr        Group B Strep Negative  Negative 09/09/20 0958          Drug Screening     Test Value Reference Range Date Time    Amphetamine Screen Negative  Negative 02/03/20 1601    Barbiturate Screen Negative  Negative 02/03/20 1601    Benzodiazepine Screen Negative  Negative 02/03/20 1601    Methadone Screen Negative  Negative 02/03/20 1601    Phencyclidine Screen Negative  Negative 02/03/20 1601    Opiates Screen Negative  Negative 02/03/20 1601    THC Screen Negative  Negative 02/03/20 1601    Cocaine Screen Negative  Negative 02/03/20 1601    Propoxyphene Screen Negative  Negative 02/03/20 1601    Buprenorphine Screen Negative  Negative 02/03/20 1601    Methamphetamine Screen Negative  Negative 02/03/20 1601    Oxycodone Screen Negative  Negative 02/03/20 1601    Tricyclic Antidepressants Screen Negative  Negative 02/03/20 1601          Other (Risk screening)     Test Value Reference Range Date Time    Varicella IgG        Parvovirus IgG        CMV IgG        Cystic Fibrosis        Hemoglobin electrophoresis        NIPT        MSAFP-4        AFP (for NTD only)                  External Prenatal Results     Pregnancy Outside Results - Transcribed From Office Records - See Scanned Records For Details     Test Value Date Time    Hgb 12.8 g/dL 07/15/20 0915      13.2 g/dL 02/03/20 1635    Hct 36.7 % 07/15/20 0915      39.6 % 02/03/20 1635    ABO O  02/03/20 1635    Rh Negative  02/03/20 1635    Antibody Screen Negative  07/15/20 0915      Negative  02/03/20 1635    Glucose Fasting GTT        Glucose Tolerance Test 1 hour       Glucose Tolerance Test 3 hour       Gonorrhea (discrete) Negative  20 1530    Chlamydia (discrete) Negative  20 1530    RPR Non-Reactive  20 1635    VDRL       Syphilis Antibody       Rubella Positive  20 1635    HBsAg Non-Reactive  20 1635    Herpes Simplex Virus PCR       Herpes Simplex VIrus Culture       HIV Non-Reactive  20 1635    Hep C RNA Quant PCR       Hep C Antibody Non-Reactive  20 1635    AFP       Group B Strep Negative  20 0958    GBS Susceptibility to Clindamycin       GBS Susceptibility to Erythromycin       Fetal Fibronectin       Genetic Testing, Maternal Blood             Drug Screening     Test Value Date Time    Urine Drug Screen       Amphetamine Screen Negative  20 1601    Barbiturate Screen Negative  20 1601    Benzodiazepine Screen Negative  20 1601    Methadone Screen Negative  20 1601    Phencyclidine Screen Negative  20 1601    Opiates Screen Negative  20 1601    THC Screen Negative  20 1601    Cocaine Screen       Propoxyphene Screen Negative  20 1601    Buprenorphine Screen Negative  20 1601    Methamphetamine Screen       Oxycodone Screen Negative  20 1601    Tricyclic Antidepressants Screen Negative  20 1601                 Past OB History:     OB History    Para Term  AB Living   1 0 0 0 0 0   SAB TAB Ectopic Molar Multiple Live Births   0 0 0 0 0 0      # Outcome Date GA Lbr Malachi/2nd Weight Sex Delivery Anes PTL Lv   1 Current                 ALLERGIES:     Allergies   Allergen Reactions   • Ibuprofen Swelling     Facial swelling        Home Medications:     Prior to Admission medications    Medication Sig Start Date End Date Taking? Authorizing Provider   Prenatal Vit-Fe Fumarate-FA (PRENATAL, CLASSIC, VITAMIN) 28-0.8 MG tablet tablet Take  by mouth Daily.   Yes Provider, MD Cecily   omeprazole (PrilOSEC) 20 MG  capsule Take 1 capsule by mouth Daily. 3/16/20 10/8/20  Anusha Linares APRN       Past Medical History: Past Medical History:   Diagnosis Date   • Anxiety    • Depression    • Hip pain       Past Surgical History Past Surgical History:   Procedure Laterality Date   • EAR TUBES      X 3 as a child   • WISDOM TOOTH EXTRACTION        Family History: Family History   Problem Relation Age of Onset   • Cancer Mother    • Hypertension Father    • Cancer Father    • Stroke Paternal Grandfather       Social History:  reports that she has never smoked. She has never used smokeless tobacco.   reports previous alcohol use.   reports no history of drug use.        Review of Systems   Constitutional: Negative for chills, fatigue and fever.   HENT: Negative for sore throat.    Eyes: Negative for visual disturbance.   Respiratory: Negative for cough, shortness of breath and wheezing.    Cardiovascular: Negative for chest pain, palpitations and leg swelling.   Gastrointestinal: Positive for abdominal pain. Negative for diarrhea, nausea and vomiting.   Genitourinary: Positive for pelvic pain. Negative for dysuria, flank pain, frequency, vaginal bleeding, vaginal discharge and vaginal pain.   Neurological: Negative for syncope, light-headedness and headaches.   Psychiatric/Behavioral: Negative for dysphoric mood and suicidal ideas. The patient is not nervous/anxious.                                                                                                                          Objective     There were no vitals filed for this visit.    OBGyn Exam  Constitutional: Appears to be in no acute distress; Eyes: sclera normal; Endocrine system: thyroid palpate is normal; Pulmonary system: lungs clear; Cardiovascular system: heart regular rate and rhythm; Gastrointestinal system: abdomen soft nontender, active bowel sounds; Urologic system: CVA negative; Psychiatric: appropriate insight; Neurologic: gait within normal  limits category 1 fetal heart rate tracing, contractions every 3 to 5 minutes.  Cervix 3/50/-3      Last Labs  Lab Results   Component Value Date    WBC 13.73 (H) 07/15/2020    RBC 4.39 07/15/2020    HGB 12.8 07/15/2020    HCT 36.7 07/15/2020    MCV 83.6 07/15/2020    MCH 29.2 07/15/2020    MCHC 34.9 07/15/2020    RDW 13.2 07/15/2020    RDWSD 40.0 07/15/2020    MPV 10.6 07/15/2020     07/15/2020        Lab Results   Component Value Date    GLUCOSE 77 2019    BUN 12 2019    CREATININE 1.07 (H) 2019     2019    K 4.1 2019     2019    CO2 28.0 2019    CALCIUM 9.3 2019    PROTEINTOT 8.0 2019    ALBUMIN 4.00 2019    ALT 21 2019    AST 21 2019    ALKPHOS 105 2019    BILITOT 0.4 2019    EGFRIFNONA 62 (L) 2019    GLOB 4.0 (H) 2019    AGRATIO 1.0 (L) 2019    BCR 11.2 2019    ANIONGAP 11.0 2019       No results found for: HCGQUAL      Assessment/Plan:  1. 29 y.o. [id number].  Patient in active labor overall doing well at this time.  Patient making slow cervical change.  Plan to admit to labor and delivery with anticipated vaginal delivery.  Routine labor care at this time.      Lissa Vargas and I have discussed pain goals for this hospitalization after reviewing her current clinical condition, medical history and prior pain experiences.  The goal is to keep her pain level tolerable.  To help achieve this, I plan to offer IV pain medication and epidural if patient desires..           This document has been electronically signed by Samy Juarez DO on 2020 23:38 CDT

## 2020-10-09 NOTE — NURSING NOTE
Per pt request, discussed with pt option for pain control. Discussed when epidural is appropriate, what the procedure consists of and how pain can be continually controlled. IV pain medication also discussed.   Pt weighing options and will let us know what she would like to do.

## 2020-10-09 NOTE — ANESTHESIA POSTPROCEDURE EVALUATION
Patient: Lissa Vargas    Procedure Summary     Date: 10/09/20 Room / Location:     Anesthesia Start: 0425 Anesthesia Stop: 1515    Procedure: LABOR ANALGESIA Diagnosis:     Scheduled Providers:  Provider: Nancy Camara CRNA    Anesthesia Type: epidural ASA Status: 2          Anesthesia Type: epidural    Vitals  Vitals Value Taken Time   /68 10/09/20 1506   Temp 101.8 °F (38.8 °C) 10/09/20 1435   Pulse 127 10/09/20 1505   Resp     SpO2 100 % 10/09/20 0548   Vitals shown include unvalidated device data.        Post Anesthesia Care and Evaluation    Patient location during evaluation: bedside  Patient participation: complete - patient participated  Level of consciousness: awake  Pain score: 0  Pain management: adequate  Airway patency: patent  Anesthetic complications: No anesthetic complications  PONV Status: none  Cardiovascular status: acceptable  Respiratory status: acceptable  Hydration status: acceptable  Post Neuraxial Block status: No signs or symptoms of PDPH  Comments: Sensory Derm level of T10 with motor function in tact

## 2020-10-10 LAB
BASOPHILS # BLD AUTO: 0.04 10*3/MM3 (ref 0–0.2)
BASOPHILS NFR BLD AUTO: 0.3 % (ref 0–1.5)
DEPRECATED RDW RBC AUTO: 43.2 FL (ref 37–54)
EOSINOPHIL # BLD AUTO: 0.08 10*3/MM3 (ref 0–0.4)
EOSINOPHIL NFR BLD AUTO: 0.6 % (ref 0.3–6.2)
ERYTHROCYTE [DISTWIDTH] IN BLOOD BY AUTOMATED COUNT: 14.4 % (ref 12.3–15.4)
HCT VFR BLD AUTO: 29.6 % (ref 34–46.6)
HGB BLD-MCNC: 9.6 G/DL (ref 12–15.9)
IMM GRANULOCYTES # BLD AUTO: 0.04 10*3/MM3 (ref 0–0.05)
IMM GRANULOCYTES NFR BLD AUTO: 0.3 % (ref 0–0.5)
LYMPHOCYTES # BLD AUTO: 2.72 10*3/MM3 (ref 0.7–3.1)
LYMPHOCYTES NFR BLD AUTO: 20.7 % (ref 19.6–45.3)
MCH RBC QN AUTO: 27 PG (ref 26.6–33)
MCHC RBC AUTO-ENTMCNC: 32.4 G/DL (ref 31.5–35.7)
MCV RBC AUTO: 83.1 FL (ref 79–97)
MONOCYTES # BLD AUTO: 0.81 10*3/MM3 (ref 0.1–0.9)
MONOCYTES NFR BLD AUTO: 6.2 % (ref 5–12)
NEUTROPHILS NFR BLD AUTO: 71.9 % (ref 42.7–76)
NEUTROPHILS NFR BLD AUTO: 9.42 10*3/MM3 (ref 1.7–7)
NRBC BLD AUTO-RTO: 0 /100 WBC (ref 0–0.2)
PLATELET # BLD AUTO: 178 10*3/MM3 (ref 140–450)
PMV BLD AUTO: 9.4 FL (ref 6–12)
RBC # BLD AUTO: 3.56 10*6/MM3 (ref 3.77–5.28)
WBC # BLD AUTO: 13.11 10*3/MM3 (ref 3.4–10.8)

## 2020-10-10 PROCEDURE — 25010000003 RHO D IMMUNE GLOBULIN 1500 UNITS SOLUTION PREFILLED SYRINGE: Performed by: OBSTETRICS & GYNECOLOGY

## 2020-10-10 PROCEDURE — 3E0234Z INTRODUCTION OF SERUM, TOXOID AND VACCINE INTO MUSCLE, PERCUTANEOUS APPROACH: ICD-10-PCS | Performed by: FAMILY MEDICINE

## 2020-10-10 PROCEDURE — 0503F POSTPARTUM CARE VISIT: CPT | Performed by: FAMILY MEDICINE

## 2020-10-10 PROCEDURE — 85025 COMPLETE CBC W/AUTO DIFF WBC: CPT | Performed by: FAMILY MEDICINE

## 2020-10-10 RX ORDER — FERROUS SULFATE TAB EC 324 MG (65 MG FE EQUIVALENT) 324 (65 FE) MG
324 TABLET DELAYED RESPONSE ORAL
Status: DISCONTINUED | OUTPATIENT
Start: 2020-10-10 | End: 2020-10-11 | Stop reason: HOSPADM

## 2020-10-10 RX ADMIN — DOCUSATE SODIUM 100 MG: 100 CAPSULE, LIQUID FILLED ORAL at 20:24

## 2020-10-10 RX ADMIN — ACETAMINOPHEN 1000 MG: 500 TABLET ORAL at 10:25

## 2020-10-10 RX ADMIN — ACETAMINOPHEN 1000 MG: 500 TABLET ORAL at 20:24

## 2020-10-10 RX ADMIN — DOCUSATE SODIUM 100 MG: 100 CAPSULE, LIQUID FILLED ORAL at 10:25

## 2020-10-10 RX ADMIN — ACETAMINOPHEN 1000 MG: 500 TABLET ORAL at 05:02

## 2020-10-10 RX ADMIN — FERROUS SULFATE TAB EC 324 MG (65 MG FE EQUIVALENT) 324 MG: 324 (65 FE) TABLET DELAYED RESPONSE at 10:25

## 2020-10-10 RX ADMIN — RHO(D) IMMUNE GLOBULIN (HUMAN) 1500 UNITS: 1500 SOLUTION INTRAMUSCULAR at 05:16

## 2020-10-10 RX ADMIN — PRENATAL VIT W/ FE FUMARATE-FA TAB 27-0.8 MG 1 TABLET: 27-0.8 TAB at 10:25

## 2020-10-10 RX ADMIN — ACETAMINOPHEN 1000 MG: 500 TABLET ORAL at 17:00

## 2020-10-10 NOTE — PROGRESS NOTES
[facility] - Vaginal Delivery Progress Note  Hospital Day: 2  Subjective:     The patient feels well. Pain is well controlled with current medications. The baby is doing well but having difficulty with latching. The patient is ambulating well. The patient is tolerating a normal diet. Lochia moderate.   Feeding method: Breastfeed.  Birth control plan: POPs.  Denies dizziness/lightheadedness upon standing.     Meds reviewed  ROS reviewed and otherwise negative     Prenatal lab:  Lab Results   Component Value Date    RUBELLAABIGG Positive 2020    ABO O 10/09/2020    RH Negative 10/09/2020       Objective:  Temp:  [98.1 °F (36.7 °C)-103.2 °F (39.6 °C)] 98.5 °F (36.9 °C)  Heart Rate:  [] 102  Resp:  [16-18] 18  BP: (105-152)/(56-84) 139/66    General: alert, well appearing, in no apparent distress  Lochia: appropriate  Uterine Fundus: firm  Labs:  Lab Results   Component Value Date    WBC 13.11 (H) 10/10/2020    HGB 9.6 (L) 10/10/2020    HCT 29.6 (L) 10/10/2020    MCV 83.1 10/10/2020     10/10/2020    RH Negative 10/09/2020    HEPBSAG Non-Reactive 2020   ?     Assessment:  Active Hospital Problems    Diagnosis  POA   • ** (normal spontaneous vaginal delivery) [O80]  Not Applicable   • Postpartum anemia [O90.81]  No   • Second degree perineal laceration during delivery [O70.1]  No   • Rh negative status during pregnancy in third trimester [O26.893, Z67.91]  Yes   • Supervision of high risk pregnancy in third trimester [O09.93]  Not Applicable      Resolved Hospital Problems    Diagnosis Date Resolved POA   • 40 weeks gestation of pregnancy [Z3A.40] 10/09/2020 Not Applicable   • Normal labor [O80, Z37.9] 10/09/2020 Not Applicable   • Normal pregnancy [Z34.90] 10/09/2020 Not Applicable   • Obesity affecting pregnancy in third trimester [O99.213] 10/09/2020 Yes       29 y.o.  40w3d with Estimated Date of Delivery: 10/6/20 s/p  Vaginal, Spontaneous  PPD# 1. Doing  well    Plan:   Continue current care   Rh negative: needs Rh Immunoglobulin  Start daily oral iron for postpartum anemia  Continue lactation education    Anticipate discharge on PPD2     Signature  Elizabeth Ritter MD  [facility] [address], [place] [address]  Office: [phone]      This document has been electronically signed by Elizabeth Ritter MD on October 10, 2020 09:25 CDT

## 2020-10-10 NOTE — PLAN OF CARE
Goal Outcome Evaluation:  Plan of Care Reviewed With: patient, spouse  Progress: improving  Outcome Summary: VSS, voiding, ambulating in room, up in chair to breastfeed/ pump. VAS 0 this shift. anxious about infantt not latching well yet. reassured though that infant dextrostix are wnl.

## 2020-10-10 NOTE — PLAN OF CARE
Problem: Adult Inpatient Plan of Care  Goal: Plan of Care Review  10/10/2020 1734 by Dior Escobedo RN  Outcome: Ongoing, Progressing  Flowsheets  Taken 10/10/2020 1734  Plan of Care Reviewed With:   patient   spouse  Taken 10/10/2020 1731  Outcome Summary: VSS, pain improves with meds, ambulating to bathroom and in room, breastfeeding and pumping, lochia light, sitz bath given to patient

## 2020-10-11 VITALS
BODY MASS INDEX: 35.43 KG/M2 | HEART RATE: 87 BPM | RESPIRATION RATE: 18 BRPM | WEIGHT: 200 LBS | TEMPERATURE: 98.2 F | SYSTOLIC BLOOD PRESSURE: 115 MMHG | DIASTOLIC BLOOD PRESSURE: 65 MMHG | OXYGEN SATURATION: 100 %

## 2020-10-11 PROCEDURE — 0503F POSTPARTUM CARE VISIT: CPT | Performed by: FAMILY MEDICINE

## 2020-10-11 RX ORDER — ACETAMINOPHEN 500 MG
1000 TABLET ORAL EVERY 6 HOURS
Qty: 240 TABLET | Refills: 0 | Status: SHIPPED | OUTPATIENT
Start: 2020-10-11 | End: 2020-12-02

## 2020-10-11 RX ORDER — PSEUDOEPHEDRINE HCL 30 MG
100 TABLET ORAL 2 TIMES DAILY PRN
Qty: 60 EACH | Refills: 0 | Status: SHIPPED | OUTPATIENT
Start: 2020-10-11 | End: 2020-12-02

## 2020-10-11 RX ORDER — FERROUS SULFATE TAB EC 324 MG (65 MG FE EQUIVALENT) 324 (65 FE) MG
324 TABLET DELAYED RESPONSE ORAL
Qty: 30 TABLET | Refills: 0 | Status: SHIPPED | OUTPATIENT
Start: 2020-10-12 | End: 2020-12-02

## 2020-10-11 RX ORDER — LANOLIN 100 %
OINTMENT (GRAM) TOPICAL
Qty: 40 G | Refills: 0 | Status: SHIPPED | OUTPATIENT
Start: 2020-10-11 | End: 2022-01-27

## 2020-10-11 RX ADMIN — BENZOCAINE AND LEVOMENTHOL: 200; 5 SPRAY TOPICAL at 10:25

## 2020-10-11 RX ADMIN — ACETAMINOPHEN 1000 MG: 500 TABLET ORAL at 06:48

## 2020-10-11 RX ADMIN — FERROUS SULFATE TAB EC 324 MG (65 MG FE EQUIVALENT) 324 MG: 324 (65 FE) TABLET DELAYED RESPONSE at 07:57

## 2020-10-11 RX ADMIN — ACETAMINOPHEN 1000 MG: 500 TABLET ORAL at 14:07

## 2020-10-11 RX ADMIN — PRENATAL VIT W/ FE FUMARATE-FA TAB 27-0.8 MG 1 TABLET: 27-0.8 TAB at 08:01

## 2020-10-11 RX ADMIN — DOCUSATE SODIUM 100 MG: 100 CAPSULE, LIQUID FILLED ORAL at 08:01

## 2020-10-11 NOTE — PLAN OF CARE
Goal Outcome Evaluation:  Plan of Care Reviewed With: patient  Progress: improving  Outcome Summary: vss, lochia small, pain controlled, breastfeeding and pumping

## 2020-10-11 NOTE — PLAN OF CARE
Goal Outcome Evaluation:  Plan of Care Reviewed With: patient  Progress: improving   Patient is doing well, reports mild soreness, vss, I&o adequate, Attempting to breastfeed, pumping, and supplementing as needed. Patient has met all criteria for discharge at this time.

## 2020-10-11 NOTE — DISCHARGE SUMMARY
[facility] - Discharge Summary from Vaginal Delivery     Patient Name: Lissa Vargas  MRN: [id number]  : 1990  Admit Date: 10/8/2020  Discharge Date: 10/11/2020  Admitting Physician: Samy Juarez DO  Discharge Physician: Elizabeth Ritter MD     Admit/Discharge Diagnoses:  Active Hospital Problems    Diagnosis  POA   • ** (normal spontaneous vaginal delivery) [O80]  Not Applicable   • Postpartum anemia [O90.81]  No   • Second degree perineal laceration during delivery [O70.1]  No   • Rh negative status during pregnancy in third trimester [O26.893, Z67.91]  Yes   • Supervision of high risk pregnancy in third trimester [O09.93]  Not Applicable      Resolved Hospital Problems    Diagnosis Date Resolved POA   • 40 weeks gestation of pregnancy [Z3A.40] 10/09/2020 Not Applicable   • Normal labor [O80, Z37.9] 10/09/2020 Not Applicable   • Normal pregnancy [Z34.90] 10/09/2020 Not Applicable   • Obesity affecting pregnancy in third trimester [O99.213] 10/09/2020 Yes        Hospital Course:  Lissa Vargas is a  who was admitted on 10/8/2020 for labor at 40w2d, she had a spontaneous vaginal delivery complicated by 2nd degree perineal laceration which was repaired.  Postpartum course was significant for postpartum anemia managed with oral iron replacement.  She received RhoGAM on 10/10/20 for Rh negative status.  She expressed desire for discharge on PPD# 2.       Procedures Performed:   Vaginal, Spontaneous      History:     The patient had a Vaginal, Spontaneous  on 10/9/2020 . She delivered a Male  infant that weighed 6 lb 3.8 oz (2830 g) . Apgars were 8  and 9  at one and five minutes, respectively. Delivery complications included None . Lacerations: 2nd  degree perineal laceration.      Subjective:  The patient feels well. Pain is well controlled with current medications. The baby is well. The patient is ambulating well. The patient is tolerating a normal  diet. Lochia minimal.  Birth control plan: POPs.  Feeding method: Breastfeed.     Objective:  Vitals:    10/11/20 0150   BP: 103/57   Pulse: 66   Resp: 16   Temp: 97.8 °F (36.6 °C)   SpO2: 99%      General: alert, well appearing, in no apparent distress  CVS: RRR  RESP: CTAB, normal effort  Uterine Fundus: firm  Ext: good ROM and strength     Labs at Discharge:  Lab Results   Component Value Date    WBC 13.11 (H) 10/10/2020    HGB 9.6 (L) 10/10/2020    HCT 29.6 (L) 10/10/2020    MCV 83.1 10/10/2020     10/10/2020        Discharge diet:   Diet Instructions     Diet: Regular      Discharge Diet: Regular        Activity at Discharge:  Activity Instructions     Bathing Restrictions      Sitz bath is OK. Shower is OK    Type of Restriction: Bathing    Bathing Restrictions: No Tub Bath    Pelvic Rest      Nothing in the vagina for 6 weeks (no sex, douching or tampon use)    Sexual Activity Restrictions      Type of Restriction: Sex    Explain Sexual Activity Restrictions: no sex for 6 weeks          Call MD if you experience heavy vaginal bleeding, frequent passage of clots, foul odor of lochia, increased pain, fever > 100.4F or any other concerns.    Discharge condition: Stable  Disposition: Home    Discharge Meds:     Your medication list      START taking these medications      Instructions Last Dose Given Next Dose Due   acetaminophen 500 MG tablet  Commonly known as: TYLENOL      Take 2 tablets by mouth Every 6 (Six) Hours.       docusate sodium 100 MG capsule      Take 100 mg by mouth 2 (Two) Times a Day As Needed for Constipation.       ferrous sulfate 324 (65 Fe) MG tablet delayed-release EC tablet  Start taking on: October 12, 2020      Take 1 tablet by mouth Daily With Breakfast.       lanolin ointment      Apply  topically to the appropriate area as directed Every 1 (One) Hour As Needed for Dry Skin (nipple pain).          CONTINUE taking these medications      Instructions Last Dose Given Next Dose Due    prenatal (CLASSIC) vitamin  tablet  Generic drug: prenatal vitamin      Take  by mouth Daily.             Where to Get Your Medications      These medications were sent to [facility] - [place], [place] - [address] [facility] - [phone]  - [phone] FX  [address] [facility] [address]    Phone: [phone]   · acetaminophen 500 MG tablet  · docusate sodium 100 MG capsule  · ferrous sulfate 324 (65 Fe) MG tablet delayed-release EC tablet  · lanolin ointment         Follow Up:  Vida Post MD  [address] [place] [address]  2ND FLOOR  [place] [id number] [phone]    Schedule an appointment as soon as possible for a visit in 2 week(s)  Postpartum visit    Signature  Elizabeth Ritter MD  [facility]  [address] [place] [address], [place] [address]  Office: [phone]      This document has been electronically signed by Elizabeth Ritter MD on October 11, 2020 09:08 CDT

## 2020-10-12 ENCOUNTER — HOSPITAL ENCOUNTER (OUTPATIENT)
Dept: LACTATION | Facility: HOSPITAL | Age: 30
Discharge: HOME | End: 2020-10-12

## 2020-10-21 ENCOUNTER — HOSPITAL ENCOUNTER (OUTPATIENT)
Dept: LACTATION | Facility: HOSPITAL | Age: 30
Discharge: HOME | End: 2020-10-21

## 2020-10-21 ENCOUNTER — TELEPHONE (OUTPATIENT)
Dept: LACTATION | Facility: HOSPITAL | Age: 30
End: 2020-10-21

## 2020-10-21 NOTE — LACTATION NOTE
Lissa came in for OPV because of having nipple soreness. Mom has been pumping only for a few days due to pain with latching. Baby's weight today was 6lbs 14oz (3120 gram) baby transferred 30 mL in 15 minutes on right side and then 25mL on left side in 10 minutes. Mom was then able to pump almost 5 ounces from both breast. I pumped with Lissa to make sure she was pumping with the correct size, I changed her to 27ml flange size, she stated that she felt better with these flanges. Mom just needed help with deeper latch and tighter position. Mom to call to schedule another OPV at the end of the week, they were going to pediatrician office following this appointment.

## 2020-10-22 ENCOUNTER — POSTPARTUM VISIT (OUTPATIENT)
Dept: OBSTETRICS AND GYNECOLOGY | Facility: CLINIC | Age: 30
End: 2020-10-22

## 2020-10-22 VITALS
HEIGHT: 63 IN | BODY MASS INDEX: 32.43 KG/M2 | DIASTOLIC BLOOD PRESSURE: 70 MMHG | WEIGHT: 183 LBS | SYSTOLIC BLOOD PRESSURE: 114 MMHG

## 2020-10-22 PROBLEM — Z86.59 HISTORY OF DEPRESSION: Status: RESOLVED | Noted: 2020-02-27 | Resolved: 2020-10-22

## 2020-10-22 PROBLEM — Z86.59 HISTORY OF ANXIETY: Status: RESOLVED | Noted: 2020-02-27 | Resolved: 2020-10-22

## 2020-10-22 PROBLEM — F34.1 DYSTHYMIC DISORDER: Status: RESOLVED | Noted: 2017-03-29 | Resolved: 2020-10-22

## 2020-10-22 PROBLEM — O26.893 RH NEGATIVE STATUS DURING PREGNANCY IN THIRD TRIMESTER: Status: RESOLVED | Noted: 2020-07-15 | Resolved: 2020-10-22

## 2020-10-22 PROBLEM — Z67.91 RH NEGATIVE STATUS DURING PREGNANCY IN THIRD TRIMESTER: Status: RESOLVED | Noted: 2020-07-15 | Resolved: 2020-10-22

## 2020-10-22 PROBLEM — M25.559 PREGNANCY RELATED HIP PAIN IN THIRD TRIMESTER, ANTEPARTUM: Status: RESOLVED | Noted: 2020-04-23 | Resolved: 2020-10-22

## 2020-10-22 PROBLEM — O09.93 SUPERVISION OF HIGH RISK PREGNANCY IN THIRD TRIMESTER: Status: RESOLVED | Noted: 2020-03-26 | Resolved: 2020-10-22

## 2020-10-22 PROBLEM — O26.893 PREGNANCY RELATED HIP PAIN IN THIRD TRIMESTER, ANTEPARTUM: Status: RESOLVED | Noted: 2020-04-23 | Resolved: 2020-10-22

## 2020-10-22 PROCEDURE — 0503F POSTPARTUM CARE VISIT: CPT | Performed by: NURSE PRACTITIONER

## 2020-10-22 NOTE — PROGRESS NOTES
"Subjective   No chief complaint on file.    Lissa Vargas is a 29 y.o. year old  presenting to be seen for her postpartum visit.  She had a vaginal delivery.   Prenatal course was been benign.    Since delivery she has not been sexually active.  She does not have concerns about post-partum blues/depression. EPDS: 5  She is both breast and bottle feeding due to decreased milk supply.  Pt met with lactation consultant yesterday and feels like breastfeeding is going much better.    The following portions of the patient's history were reviewed and updated as appropriate:current medications and allergies    Social History    Tobacco Use      Smoking status: Never Smoker      Smokeless tobacco: Never Used    Review of Systems   Constitutional: Negative for chills, fatigue, fever and unexpected weight change.   Respiratory: Negative for apnea and shortness of breath.    Cardiovascular: Negative for chest pain and palpitations.   Gastrointestinal: Negative for constipation and diarrhea.   Genitourinary: Negative for dysuria, flank pain and urgency.   Skin: Negative for rash.   Neurological: Negative for headaches.   Psychiatric/Behavioral: Negative for sleep disturbance.         Objective   /70   Ht 160 cm (63\")   Wt 83 kg (183 lb)   LMP 2019 (Exact Date)   Breastfeeding Yes   BMI 32.42 kg/m²     General:  well developed; well nourished  no acute distress  appears stated age   Abdomen: soft, non-tender; no masses  no umbilical or inguinal hernias are present  no hepato-splenomegaly  fundus firm and non-tender  Normal findings: soft, non-tender, bowel sounds normal, no masses palpable and symmetric   Pelvis: Not performed.            Pt considering POP for PP contraception.  Continue to take ferrous sulfate for pp anemia.  RTC in 5 weeks for 6 wk pp follow up or sooner if needed.          No orders of the defined types were placed in this encounter.        This note was electronically " signed.    Anusha Linares, ANNABELLE  October 22, 2020

## 2020-12-02 ENCOUNTER — POSTPARTUM VISIT (OUTPATIENT)
Dept: OBSTETRICS AND GYNECOLOGY | Facility: CLINIC | Age: 30
End: 2020-12-02

## 2020-12-02 VITALS
HEIGHT: 63 IN | SYSTOLIC BLOOD PRESSURE: 118 MMHG | WEIGHT: 177 LBS | BODY MASS INDEX: 31.36 KG/M2 | DIASTOLIC BLOOD PRESSURE: 70 MMHG

## 2020-12-02 DIAGNOSIS — Z30.011 ENCOUNTER FOR INITIAL PRESCRIPTION OF CONTRACEPTIVE PILLS: ICD-10-CM

## 2020-12-02 DIAGNOSIS — Z23 INFLUENZA VACCINE ADMINISTERED: ICD-10-CM

## 2020-12-02 PROCEDURE — 90471 IMMUNIZATION ADMIN: CPT | Performed by: OBSTETRICS & GYNECOLOGY

## 2020-12-02 PROCEDURE — 90686 IIV4 VACC NO PRSV 0.5 ML IM: CPT | Performed by: OBSTETRICS & GYNECOLOGY

## 2020-12-02 PROCEDURE — 0503F POSTPARTUM CARE VISIT: CPT | Performed by: OBSTETRICS & GYNECOLOGY

## 2020-12-02 RX ORDER — ACETAMINOPHEN AND CODEINE PHOSPHATE 120; 12 MG/5ML; MG/5ML
1 SOLUTION ORAL DAILY
Qty: 84 TABLET | Refills: 3 | Status: SHIPPED | OUTPATIENT
Start: 2020-12-02 | End: 2021-12-09 | Stop reason: SDUPTHER

## 2020-12-03 NOTE — PROGRESS NOTES
"Postpartum visit      Lissa Vargas is a 30 y.o.  s/p Vaginal, Spontaneous on 10/9 at 40w3d secondary toEIOL who presents today for postpartum check.  The patient states she is doing very well.  Patient denies postpartum depression.  Menstrual cycles have not resumed.  Breastfeeding.  Desires POPs for contraception.  She has not resumed sexual intercourse.  Denies bowel or bladder issues.    PHYSICAL EXAM:    /70   Ht 160 cm (63\")   Wt 80.3 kg (177 lb)   LMP 2019 (Exact Date)   Breastfeeding Yes   BMI 31.35 kg/m²   Postpartum Depression Screening Questionnaire: 0, no treatment indicated.    IMPRESSION/PLAN:  30 y.o.  s/p term Vaginal, Spontaneous on 10/9.  Doing well.   - Recovered nicely from her delivery  - Contraception: POPs  - RTC for annual gynecological exam  - Return to normal physical activity  - Flu shot today    This document has been electronically signed by Vida Post MD on 2020 19:23 CST.  "

## 2021-01-20 ENCOUNTER — OFFICE VISIT (OUTPATIENT)
Dept: OBSTETRICS AND GYNECOLOGY | Facility: CLINIC | Age: 31
End: 2021-01-20

## 2021-01-20 VITALS
DIASTOLIC BLOOD PRESSURE: 70 MMHG | WEIGHT: 172.6 LBS | BODY MASS INDEX: 30.58 KG/M2 | HEART RATE: 79 BPM | SYSTOLIC BLOOD PRESSURE: 110 MMHG | HEIGHT: 63 IN

## 2021-01-20 DIAGNOSIS — N61.0 NIPPLE INFECTION IN FEMALE: Primary | ICD-10-CM

## 2021-01-20 DIAGNOSIS — O92.29 PAIN OF BREAST DURING BREASTFEEDING: ICD-10-CM

## 2021-01-20 PROCEDURE — 99213 OFFICE O/P EST LOW 20 MIN: CPT | Performed by: NURSE PRACTITIONER

## 2021-01-20 RX ORDER — CLOTRIMAZOLE AND BETAMETHASONE DIPROPIONATE 10; .64 MG/G; MG/G
CREAM TOPICAL 2 TIMES DAILY
Qty: 45 G | Refills: 0 | Status: SHIPPED | OUTPATIENT
Start: 2021-01-20 | End: 2022-01-27

## 2021-01-20 RX ORDER — FLUCONAZOLE 150 MG/1
150 TABLET ORAL ONCE
Qty: 2 TABLET | Refills: 0 | Status: SHIPPED | OUTPATIENT
Start: 2021-01-20 | End: 2021-01-20

## 2021-01-22 NOTE — PROGRESS NOTES
Subjective   Lissa Vargas is a 30 y.o. female.     History of Present Illness   Pt presents with complaints of a rash on the nipple bilaterally. Pt has been breastfeeding x almost 4 months with no issues with milk supply. She developed the rash first just on the right side. It got better but then returned to both sides. Pt had messaged and followed my instructions to apply clotrimazole ointment for the last 2 nights and allowing the skin open to air as much as possible. She states since it has started she has been exclusively pumping but even pumping is creating more pain and cracking the skin. The area is pruritic also. Denies fever, pain outside of the rash, and other S/S of mastitis.    Pt denies any S/S of thrush in the baby but is monitoring closely.     The following portions of the patient's history were reviewed and updated as appropriate: allergies, current medications, past family history, past medical history, past social history, past surgical history and problem list.    Review of Systems   Constitutional: Negative.  Negative for chills and fever.   Genitourinary: Positive for breast pain (areola only). Negative for breast discharge and breast lump.   Skin: Positive for color change, dry skin and rash.        Bilateral areolas       Objective   Physical Exam  Vitals signs reviewed.   Constitutional:       Appearance: Normal appearance.   Cardiovascular:      Rate and Rhythm: Normal rate and regular rhythm.      Heart sounds: Normal heart sounds.   Pulmonary:      Effort: Pulmonary effort is normal.      Breath sounds: Normal breath sounds.   Chest:      Breasts:         Right: Skin change and tenderness present. No swelling, bleeding, inverted nipple or mass. Nipple discharge: lactating.         Left: Skin change and tenderness present. No swelling, bleeding, inverted nipple or mass. Nipple discharge: lactating.       Neurological:      Mental Status: She is alert and oriented to person, place,  and time.           Assessment/Plan   Diagnoses and all orders for this visit:    1. Nipple infection in female (Primary)  -     fluconazole (DIFLUCAN) 150 MG tablet; Take 1 tablet by mouth 1 (One) Time for 1 dose. Repeat dose in 72 hours if still symptomatic  Dispense: 2 tablet; Refill: 0  -     clotrimazole-betamethasone (Lotrisone) 1-0.05 % cream; Apply  topically to the appropriate area as directed 2 (Two) Times a Day.  Dispense: 45 g; Refill: 0    2. Pain of breast during breastfeeding      Discussed findings are consistent with a fungal infection. No signs of mastitis at this time. I encouraged pt to monitor for any progression as she is increased risk for a secondary mastitis.  Pt feels more comfortable pumping during this time. Encouraged her to sterilize her pumping equipment regularly. Baby doesn't take a paci.  Take diflucan 150mg x 2 doses, 72 hours apart. Apply lotrisone BID after pumping and wipe clean prior to next pumping. Apply aquaphor and lanolin generously PRN between medication applications. Monitor closely and RTC if symptoms persist, worsen or recur. Pt voices understanding and agreement with this plan of care.

## 2021-03-04 ENCOUNTER — HOSPITAL ENCOUNTER (OUTPATIENT)
Dept: LACTATION | Facility: HOSPITAL | Age: 31
Discharge: HOME | End: 2021-03-04
Admitting: PEDIATRICS

## 2021-03-04 NOTE — LACTATION NOTE
Mother presents with damage to both areolas. The left side appears to be the worst. To me looks like the damage is caused from inappropriate flange size. The right side is healing but still raw in appearence. Area of damage is about 3-4cm long. On both sides.     Nipples were measured and flange size was determined using the [employer] flange sizing tool on their website.       APNO ointment was called in to apply to the damage areola.     I also assessed the breastfeeding session. Infant has been diagnosed with mild torticollis. Mother has been seeing physical therapy but states they only have him coming once a month. Infant head has a flattened side on the left where he turns his head to the left. It was noted that he rests with his tongue sticking out. I assessed inside his mouth and I believe infant has a tongue tie as well. Infant has a difficult time latching to the right breast. We had to get creative with his head placement due to him not wanting to turn his head that way. He does latch to the right breast but constantly pulls and pops off the breast. Pre wt: 6395g Post wt: 6460g      Instructions given for APNO ointment. Encouraged mother to hand express milk after breast feedings instead of using the pump until she can get the correct flange size. Recommended the flange inserts from the [employer] web site. I also recommended stopping the coconut oil just to see if this is causing added irritation.

## 2021-03-08 ENCOUNTER — TELEPHONE (OUTPATIENT)
Dept: LACTATION | Facility: HOSPITAL | Age: 31
End: 2021-03-08

## 2021-12-09 DIAGNOSIS — Z30.011 ENCOUNTER FOR INITIAL PRESCRIPTION OF CONTRACEPTIVE PILLS: ICD-10-CM

## 2021-12-13 RX ORDER — ACETAMINOPHEN AND CODEINE PHOSPHATE 120; 12 MG/5ML; MG/5ML
1 SOLUTION ORAL DAILY
Qty: 84 TABLET | Refills: 3 | Status: SHIPPED | OUTPATIENT
Start: 2021-12-13 | End: 2022-01-27

## 2022-01-13 ENCOUNTER — TELEPHONE (OUTPATIENT)
Dept: OBSTETRICS AND GYNECOLOGY | Facility: CLINIC | Age: 32
End: 2022-01-13

## 2022-01-13 NOTE — TELEPHONE ENCOUNTER
PATIENT CALLED STATING THAT SHE HAS 3 POSITIVE PREG TEST. SHE ALSO STATED THAT SHE HASN'T HAD A CYCLE SINCE SHE HAD HER BABY IN 2020. SHE'S CURRENTLY BREASTFEEDING AS WELL.. CAN YOU ORDER HER LABS IN THE [facility]? ALSO, GIVE HER A CALL BACK AT THE NUMBER LISTED IN HER CHART.  THANKS

## 2022-01-14 ENCOUNTER — LAB (OUTPATIENT)
Dept: LAB | Facility: OTHER | Age: 32
End: 2022-01-14

## 2022-01-14 DIAGNOSIS — Z32.01 POSITIVE PREGNANCY TEST: ICD-10-CM

## 2022-01-14 DIAGNOSIS — Z32.01 POSITIVE PREGNANCY TEST: Primary | ICD-10-CM

## 2022-01-14 LAB — HCG INTACT+B SERPL-ACNC: NORMAL MIU/ML

## 2022-01-14 PROCEDURE — 36415 COLL VENOUS BLD VENIPUNCTURE: CPT | Performed by: OBSTETRICS & GYNECOLOGY

## 2022-01-14 PROCEDURE — 84702 CHORIONIC GONADOTROPIN TEST: CPT | Performed by: OBSTETRICS & GYNECOLOGY

## 2022-01-14 NOTE — TELEPHONE ENCOUNTER
Returned patient call, let her know I talked to dr aragon and we are entering orders to check labs and she can go to [facility] during normal lab hours to have that drawn. Once we get results someone will contact her to discuss.  Patient verbalized understanding- orders entered.

## 2022-01-17 DIAGNOSIS — Z32.01 POSITIVE BLOOD PREGNANCY TEST: Primary | ICD-10-CM

## 2022-01-27 ENCOUNTER — INITIAL PRENATAL (OUTPATIENT)
Dept: OBSTETRICS AND GYNECOLOGY | Facility: CLINIC | Age: 32
End: 2022-01-27

## 2022-01-27 VITALS — WEIGHT: 169 LBS | SYSTOLIC BLOOD PRESSURE: 116 MMHG | BODY MASS INDEX: 29.94 KG/M2 | DIASTOLIC BLOOD PRESSURE: 74 MMHG

## 2022-01-27 DIAGNOSIS — Z67.91 RH NEGATIVE STATUS DURING PREGNANCY IN FIRST TRIMESTER: ICD-10-CM

## 2022-01-27 DIAGNOSIS — O26.891 RH NEGATIVE STATUS DURING PREGNANCY IN FIRST TRIMESTER: ICD-10-CM

## 2022-01-27 DIAGNOSIS — O09.91 SUPERVISION OF HIGH RISK PREGNANCY IN FIRST TRIMESTER: Primary | ICD-10-CM

## 2022-01-27 DIAGNOSIS — Z3A.08 8 WEEKS GESTATION OF PREGNANCY: ICD-10-CM

## 2022-01-27 DIAGNOSIS — O21.9 NAUSEA AND VOMITING DURING PREGNANCY: ICD-10-CM

## 2022-01-27 PROCEDURE — 0501F PRENATAL FLOW SHEET: CPT | Performed by: OBSTETRICS & GYNECOLOGY

## 2022-01-27 RX ORDER — ONDANSETRON 4 MG/1
4 TABLET, FILM COATED ORAL EVERY 8 HOURS PRN
Qty: 30 TABLET | Refills: 6 | Status: SHIPPED | OUTPATIENT
Start: 2022-01-27 | End: 2022-09-04 | Stop reason: HOSPADM

## 2022-01-28 NOTE — PROGRESS NOTES
[facility]  Obstetrics  Date of Service: 2022    CHIEF COMPLAINT:  New prenatal visit    HISTORY OF PRESENT ILLNESS:  Lissa Vargas is a 31 y.o. y/o  at 8w4d by LMP = 1TUS.  This was an unplanned pregnancy but she is excited and the patient is supported by her  Ashish.  Reports some nausea without vomiting.  Reports breast tenderness.  She denies any vaginal bleeding.  She has started taking a prenatal vitamin.  She is still breastfeeding her son.    She had an uncomplicated pregnancy with an uncomplicated  in 2020.    REVIEW OF SYSTEMS  Review of Systems   Constitutional: Negative.    HENT: Negative.    Eyes: Negative.    Respiratory: Negative.    Cardiovascular: Negative.    Gastrointestinal: Negative.    Endocrine: Negative.    Genitourinary: Negative.    Musculoskeletal: Negative.    Allergic/Immunologic: Negative.    Neurological: Negative.    Hematological: Negative.    Psychiatric/Behavioral: Negative.      PRENATAL RISK FACTORS   Problems (from 22 to present)     Problem Noted Resolved    Supervision of high risk pregnancy in first trimester 2022 by Vida Post MD No    Rh negative status during pregnancy in first trimester 2022 by Vida Post MD No        OBSTETRIC HISTORY:  OB History    Para Term  AB Living   2 1 1 0 0 1   SAB IAB Ectopic Molar Multiple Live Births   0 0 0 0 0 1      # Outcome Date GA Lbr Malachi/2nd Weight Sex Delivery Anes PTL Lv   2 Current            1 Term 10/09/20 40w3d 14:03 / 00:08 2830 g (6 lb 3.8 oz) M Vag-Spont EPI N MARV     GYN HISTORY:  Denies h/o sexually transmitted infections/pelvic inflammatory disease  Denies h/o abnormal pap smears  Last pap smear:   Denies h/o gynecologic surgeries, including biopsies of the cervix    PAST MEDICAL HISTORY:  Past Medical History:   Diagnosis Date   • Anxiety    • Depression    • Hip pain      PAST  SURGICAL HISTORY:  Past Surgical History:   Procedure Laterality Date   • EAR TUBES      X 3 as a child   • WISDOM TOOTH EXTRACTION       FAMILY HISTORY:  Family History   Problem Relation Age of Onset   • Cancer Mother    • Hypertension Father    • Cancer Father    • Stroke Paternal Grandfather      SOCIAL HISTORY:  Social History     Socioeconomic History   • Marital status:    Tobacco Use   • Smoking status: Never Smoker   • Smokeless tobacco: Never Used   Substance and Sexual Activity   • Alcohol use: Not Currently     Comment: Rarely   • Drug use: No   • Sexual activity: Yes     Partners: Male     Birth control/protection: Pill     GENETIC SCREENING:  Age >36 yo as of BOLA: no  Thalassemia: no  NTD: no  CHD: no  Down Syndrome/MR/Fragile X/Autism: no  Ashkenazi [employer] with Ascencion-Sachs, [place], familial dysautonomia: no  Sickle cell disease or trait: no  Hemophilia: no  Muscular dystrophy: no  Cystic fibrosis: no  Adriana's chorea: no  Birth defects: no  Genetic/chromosomal disorders: no    INFECTION HISTORY:  TB exposure: no  HSV: no  Illness since LMP: no  Prior GBS infected child: no  STIs: no    ALLERGIES:  Allergies   Allergen Reactions   • Ibuprofen Swelling     Facial swelling     MEDICATIONS:  Prior to Admission medications    Medication Sig Start Date End Date Taking? Authorizing Provider   Prenatal Vit-Fe Fumarate-FA (PRENATAL, CLASSIC, VITAMIN) 28-0.8 MG tablet tablet Take  by mouth Daily.   Yes Provider, Cecily, MD     PHYSICAL EXAM:   /74   Wt 76.7 kg (169 lb)   LMP  (LMP Unknown)   BMI 29.94 kg/m²   General: Alert, healthy, no distress, well nourished and well developed.  Neurologic: Alert, oriented to person, place, and time.  Gait normal.  Cranial nerves II-XII grossly intact.  HEENT: Normocephalic, atraumatic.  Extraocular muscles intact, pupils equal and reactive x2.    Teeth: Normal hygiene.  Neck: Supple, no adenopathy, thyroid normal size, non-tender, without nodularity,  trachea midline.  Lungs: Normal respiratory effort.  Clear to auscultation bilaterally.  No wheezes, rhonci, or rales.  Heart: Regular rate and rhythm.  No murmer, rub or gallop.  Abdomen: Soft, non-tender, non-distended,no masses, no hepatosplenomegaly, no hernia.  Skin: No rash, no lesions.  Extremities: No cyanosis, clubbing or edema.    Bedside ultrasound performed by myself which shows the findings below:  Viable SIUP, FHT 150s bpm    IMPRESSION:  Lissa Vargas is a 31 y.o.  at 8w4d for a new prenatal visit.    PLAN:  1.  IUP at 8w4d  - Options counseling performed and patient desires continuation of pregnancy to term   - Prenatal labs ordered  - Genetic testing, including cystic fibrosis, was discussed and patient desires; will do NIPT at next visit  - Continue prenatal vitamins  - Weight gain counseling performed.   - Pregravid BMI 25-29.9: Recommend 15-25 lb  - Return to clinic in 4 weeks for return prenatal visit  - Reviewed COVID-19 visitation policy  - Reviewed COVID-19 precautions     Diagnosis Plan   1. Supervision of high risk pregnancy in first trimester     2. Rh negative status during pregnancy in first trimester     3. Nausea and vomiting during pregnancy  ondansetron (Zofran) 4 MG tablet   4. 8 weeks gestation of pregnancy       Vida Post MD  2022  18:06 CST

## 2022-02-24 ENCOUNTER — ROUTINE PRENATAL (OUTPATIENT)
Dept: OBSTETRICS AND GYNECOLOGY | Facility: CLINIC | Age: 32
End: 2022-02-24

## 2022-02-24 ENCOUNTER — LAB (OUTPATIENT)
Dept: LAB | Facility: OTHER | Age: 32
End: 2022-02-24

## 2022-02-24 VITALS — WEIGHT: 162.2 LBS | BODY MASS INDEX: 28.73 KG/M2 | SYSTOLIC BLOOD PRESSURE: 102 MMHG | DIASTOLIC BLOOD PRESSURE: 56 MMHG

## 2022-02-24 DIAGNOSIS — O09.91 SUPERVISION OF HIGH RISK PREGNANCY IN FIRST TRIMESTER: Primary | ICD-10-CM

## 2022-02-24 DIAGNOSIS — Z3A.12 12 WEEKS GESTATION OF PREGNANCY: ICD-10-CM

## 2022-02-24 DIAGNOSIS — O26.891 RH NEGATIVE STATUS DURING PREGNANCY IN FIRST TRIMESTER: ICD-10-CM

## 2022-02-24 DIAGNOSIS — Z67.91 RH NEGATIVE STATUS DURING PREGNANCY IN FIRST TRIMESTER: ICD-10-CM

## 2022-02-24 PROCEDURE — 0502F SUBSEQUENT PRENATAL CARE: CPT | Performed by: OBSTETRICS & GYNECOLOGY

## 2022-02-24 NOTE — PROGRESS NOTES
CC: Prenatal visit    Lissa Vargas is a 31 y.o.  at 12w4d.  Doing well.  Denies contractions, LOF, or VB.  Zofran helps w/ nausea.    /56   Wt 73.6 kg (162 lb 3.2 oz)   LMP  (LMP Unknown)   BMI 28.73 kg/m²   Fetal Heart Rate: 150s     Problems (from 22 to present)     Problem Noted Resolved    Supervision of high risk pregnancy in first trimester 2022 by Vida Post MD No    Rh negative status during pregnancy in first trimester 2022 by Vida Post MD No        A/P: Lissa Vargas is a 31 y.o.  at 12w4d.  - RTC in 4 weeks  - NIPT today  - Reviewed COVID-19 visitation policy  - Reviewed COVID-19 precautions     Diagnosis Plan   1. Supervision of high risk pregnancy in first trimester  OB Panel With HIV    Urine Drug Screen - Urine, Clean Catch    Chlamydia trachomatis, Neisseria gonorrhoeae, Trichomonas vaginalis, PCR - Urine, Urine, Clean Catch   2. Rh negative status during pregnancy in first trimester     3. 12 weeks gestation of pregnancy       Vida Post MD  2022  15:07 CST

## 2022-03-09 DIAGNOSIS — O09.91 SUPERVISION OF HIGH RISK PREGNANCY IN FIRST TRIMESTER: ICD-10-CM

## 2022-03-24 ENCOUNTER — ROUTINE PRENATAL (OUTPATIENT)
Dept: OBSTETRICS AND GYNECOLOGY | Facility: CLINIC | Age: 32
End: 2022-03-24

## 2022-03-24 VITALS — SYSTOLIC BLOOD PRESSURE: 102 MMHG | DIASTOLIC BLOOD PRESSURE: 62 MMHG | WEIGHT: 163.8 LBS | BODY MASS INDEX: 29.02 KG/M2

## 2022-03-24 DIAGNOSIS — O26.892 RH NEGATIVE STATUS DURING PREGNANCY IN SECOND TRIMESTER: ICD-10-CM

## 2022-03-24 DIAGNOSIS — Z3A.16 16 WEEKS GESTATION OF PREGNANCY: ICD-10-CM

## 2022-03-24 DIAGNOSIS — Z36.89 ENCOUNTER FOR FETAL ANATOMIC SURVEY: ICD-10-CM

## 2022-03-24 DIAGNOSIS — O21.9 NAUSEA AND VOMITING DURING PREGNANCY: ICD-10-CM

## 2022-03-24 DIAGNOSIS — Z67.91 RH NEGATIVE STATUS DURING PREGNANCY IN SECOND TRIMESTER: ICD-10-CM

## 2022-03-24 DIAGNOSIS — O09.92 SUPERVISION OF HIGH RISK PREGNANCY IN SECOND TRIMESTER: Primary | ICD-10-CM

## 2022-03-24 PROCEDURE — 0502F SUBSEQUENT PRENATAL CARE: CPT | Performed by: OBSTETRICS & GYNECOLOGY

## 2022-03-24 RX ORDER — METOCLOPRAMIDE 10 MG/1
10 TABLET ORAL EVERY 6 HOURS PRN
Qty: 30 TABLET | Refills: 3 | Status: SHIPPED | OUTPATIENT
Start: 2022-03-24 | End: 2022-09-04 | Stop reason: HOSPADM

## 2022-03-24 NOTE — PROGRESS NOTES
CC: Prenatal visit    Lissa Vargas is a 31 y.o.  at 16w4d.  Doing well.  Denies contractions, LOF, or VB.  Has some nausea but doesn't like to take the Zofran because it will constipate her.    /62   Wt 74.3 kg (163 lb 12.8 oz)   LMP  (LMP Unknown)   BMI 29.02 kg/m²   Fetal Heart Rate: 142     Problems (from 22 to present)     Problem Noted Resolved    Supervision of high risk pregnancy in second trimester 2022 by Vida Post MD No    Rh negative status during pregnancy in second trimester 2022 by Vida Post MD No        A/P: Lissa Vargas is a 31 y.o.  at 16w4d.  - RTC in 4 weeks w/ anatomy US  - Will send in Reglan PRN  - Reviewed COVID-19 visitation policy  - Reviewed COVID-19 precautions     Diagnosis Plan   1. Supervision of high risk pregnancy in second trimester     2. Rh negative status during pregnancy in second trimester     3. Encounter for fetal anatomic survey  US Ob 14 + Weeks Single or First Gestation   4. 16 weeks gestation of pregnancy     5. Nausea and vomiting during pregnancy  metoclopramide (Reglan) 10 MG tablet     Vida Post MD  3/24/2022  14:30 CDT

## 2022-04-22 ENCOUNTER — TELEPHONE (OUTPATIENT)
Dept: OBSTETRICS AND GYNECOLOGY | Facility: CLINIC | Age: 32
End: 2022-04-22

## 2022-04-22 NOTE — TELEPHONE ENCOUNTER
PATIENT WAS SCHEDULED ON THE WRONG ULTRASOUND SCHEDULE FOR AN ANATOMY SCAN   SHE NEEDS TO RESCHEDULE HER APPT  I CALLED THE PT AND GOT A VM LEFT HER A MSG

## 2022-04-28 ENCOUNTER — ROUTINE PRENATAL (OUTPATIENT)
Dept: OBSTETRICS AND GYNECOLOGY | Facility: CLINIC | Age: 32
End: 2022-04-28

## 2022-04-28 VITALS — DIASTOLIC BLOOD PRESSURE: 70 MMHG | SYSTOLIC BLOOD PRESSURE: 120 MMHG | BODY MASS INDEX: 29.94 KG/M2 | WEIGHT: 169 LBS

## 2022-04-28 DIAGNOSIS — O26.892 RH NEGATIVE STATUS DURING PREGNANCY IN SECOND TRIMESTER: ICD-10-CM

## 2022-04-28 DIAGNOSIS — O09.92 SUPERVISION OF HIGH RISK PREGNANCY IN SECOND TRIMESTER: ICD-10-CM

## 2022-04-28 DIAGNOSIS — Z67.91 RH NEGATIVE STATUS DURING PREGNANCY IN SECOND TRIMESTER: ICD-10-CM

## 2022-04-28 DIAGNOSIS — Z3A.21 21 WEEKS GESTATION OF PREGNANCY: Primary | ICD-10-CM

## 2022-04-28 PROCEDURE — 0502F SUBSEQUENT PRENATAL CARE: CPT | Performed by: NURSE PRACTITIONER

## 2022-04-28 NOTE — PROGRESS NOTES
CC: Prenatal visit    Lissa Vargas is a 31 y.o.  at 21w4d.  Doing well.  No complaints.  Denies contractions, LOF, or VB.  Reports +FM.    /70   Wt 76.7 kg (169 lb)   LMP  (LMP Unknown)   BMI 29.94 kg/m²   Reviewed prelim fetal anatomy scan report- fetus vertex, placenta posterior with no previa, HC 70%tile, AC 65%tile, all anatomy seen, bilateral ovaries wnl, CL 4.41cm            Problems (from 22 to present)     Problem Noted Resolved    Supervision of high risk pregnancy in second trimester 2022 by Vida Post MD No    Rh negative status during pregnancy in second trimester 2022 by Vida Post MD No          A/P: Lissa Vargas is a 31 y.o.  at 21w4d.  - RTC in 4 weeks     Diagnosis Plan   1. 21 weeks gestation of pregnancy     2. Supervision of high risk pregnancy in second trimester     3. Rh negative status during pregnancy in second trimester         ANNABELLE Tyson  2022  13:45 CDT

## 2022-05-26 ENCOUNTER — ROUTINE PRENATAL (OUTPATIENT)
Dept: OBSTETRICS AND GYNECOLOGY | Facility: CLINIC | Age: 32
End: 2022-05-26

## 2022-05-26 VITALS — WEIGHT: 175 LBS | DIASTOLIC BLOOD PRESSURE: 64 MMHG | SYSTOLIC BLOOD PRESSURE: 118 MMHG | BODY MASS INDEX: 31 KG/M2

## 2022-05-26 DIAGNOSIS — O09.92 SUPERVISION OF HIGH RISK PREGNANCY IN SECOND TRIMESTER: Primary | ICD-10-CM

## 2022-05-26 DIAGNOSIS — O26.892 RH NEGATIVE STATUS DURING PREGNANCY IN SECOND TRIMESTER: ICD-10-CM

## 2022-05-26 DIAGNOSIS — Z67.91 RH NEGATIVE STATUS DURING PREGNANCY IN SECOND TRIMESTER: ICD-10-CM

## 2022-05-26 DIAGNOSIS — Z3A.25 25 WEEKS GESTATION OF PREGNANCY: ICD-10-CM

## 2022-05-26 PROCEDURE — 0502F SUBSEQUENT PRENATAL CARE: CPT | Performed by: OBSTETRICS & GYNECOLOGY

## 2022-05-26 NOTE — PROGRESS NOTES
"CC: Prenatal visit    Lissa Vargas is a 31 y.o.  at 25w4d.  Doing well.  Denies contractions, LOF, or VB.  Reports good FM.  Has had intermittent vaginal pains and feels like \"a band is about to snap.\"    /64   Wt 79.4 kg (175 lb)   LMP  (LMP Unknown)   BMI 31.00 kg/m²   Fundal Height (cm): 25 cm  Fetal Heart Rate: 124     Problems (from 22 to present)     Problem Noted Resolved    Supervision of high risk pregnancy in second trimester 2022 by Vida Post MD No    Rh negative status during pregnancy in second trimester 2022 by Vida Post MD No        A/P: Lissa Vargas is a 31 y.o.  at 25w4d.  - RTC in 4 weeks w/ 3T labs, Tdap, RhoGAM, breastpump script  - Advised pregnancy support belt  - Reviewed COVID-19 visitation policy  - Reviewed COVID-19 precautions     Diagnosis Plan   1. Supervision of high risk pregnancy in second trimester  CBC (No Diff)    Glucose, Post 50 Gm Glucola   2. Rh negative status during pregnancy in second trimester     3. 25 weeks gestation of pregnancy       Vida Post MD  2022  15:36 CDT  "

## 2022-06-21 ENCOUNTER — LAB (OUTPATIENT)
Dept: LAB | Facility: HOSPITAL | Age: 32
End: 2022-06-21

## 2022-06-21 ENCOUNTER — ROUTINE PRENATAL (OUTPATIENT)
Dept: OBSTETRICS AND GYNECOLOGY | Facility: CLINIC | Age: 32
End: 2022-06-21

## 2022-06-21 VITALS — BODY MASS INDEX: 31.89 KG/M2 | WEIGHT: 180 LBS | DIASTOLIC BLOOD PRESSURE: 70 MMHG | SYSTOLIC BLOOD PRESSURE: 112 MMHG

## 2022-06-21 DIAGNOSIS — O26.893 RH NEGATIVE STATUS DURING PREGNANCY IN THIRD TRIMESTER: ICD-10-CM

## 2022-06-21 DIAGNOSIS — O09.92 SUPERVISION OF HIGH RISK PREGNANCY IN SECOND TRIMESTER: ICD-10-CM

## 2022-06-21 DIAGNOSIS — Z67.91 RH NEGATIVE STATUS DURING PREGNANCY IN THIRD TRIMESTER: ICD-10-CM

## 2022-06-21 DIAGNOSIS — Z23 NEED FOR TDAP VACCINATION: ICD-10-CM

## 2022-06-21 DIAGNOSIS — O09.93 SUPERVISION OF HIGH RISK PREGNANCY IN THIRD TRIMESTER: Primary | ICD-10-CM

## 2022-06-21 DIAGNOSIS — Z3A.29 29 WEEKS GESTATION OF PREGNANCY: ICD-10-CM

## 2022-06-21 DIAGNOSIS — O09.91 SUPERVISION OF HIGH RISK PREGNANCY IN FIRST TRIMESTER: ICD-10-CM

## 2022-06-21 LAB
ABO GROUP BLD: NORMAL
AMPHET+METHAMPHET UR QL: NEGATIVE
AMPHETAMINES UR QL: NEGATIVE
BARBITURATES UR QL SCN: NEGATIVE
BASOPHILS # BLD AUTO: 0.04 10*3/MM3 (ref 0–0.2)
BASOPHILS NFR BLD AUTO: 0.3 % (ref 0–1.5)
BENZODIAZ UR QL SCN: NEGATIVE
BLD GP AB SCN SERPL QL: NEGATIVE
BUPRENORPHINE SERPL-MCNC: NEGATIVE NG/ML
CANNABINOIDS SERPL QL: NEGATIVE
COCAINE UR QL: NEGATIVE
DEPRECATED RDW RBC AUTO: 40.4 FL (ref 37–54)
EOSINOPHIL # BLD AUTO: 0.1 10*3/MM3 (ref 0–0.4)
EOSINOPHIL NFR BLD AUTO: 0.8 % (ref 0.3–6.2)
ERYTHROCYTE [DISTWIDTH] IN BLOOD BY AUTOMATED COUNT: 13.3 % (ref 12.3–15.4)
GLUCOSE 1H P 100 G GLC PO SERPL-MCNC: 68 MG/DL (ref 65–139)
HBV SURFACE AG SERPL QL IA: NORMAL
HCT VFR BLD AUTO: 34.2 % (ref 34–46.6)
HCV AB SER DONR QL: NORMAL
HGB BLD-MCNC: 11.4 G/DL (ref 12–15.9)
HIV1+2 AB SER QL: NORMAL
IMM GRANULOCYTES # BLD AUTO: 0.11 10*3/MM3 (ref 0–0.05)
IMM GRANULOCYTES NFR BLD AUTO: 0.8 % (ref 0–0.5)
LYMPHOCYTES # BLD AUTO: 2.57 10*3/MM3 (ref 0.7–3.1)
LYMPHOCYTES NFR BLD AUTO: 19.8 % (ref 19.6–45.3)
Lab: NORMAL
MCH RBC QN AUTO: 28.1 PG (ref 26.6–33)
MCHC RBC AUTO-ENTMCNC: 33.3 G/DL (ref 31.5–35.7)
MCV RBC AUTO: 84.2 FL (ref 79–97)
METHADONE UR QL SCN: NEGATIVE
MONOCYTES # BLD AUTO: 0.65 10*3/MM3 (ref 0.1–0.9)
MONOCYTES NFR BLD AUTO: 5 % (ref 5–12)
NEUTROPHILS NFR BLD AUTO: 73.3 % (ref 42.7–76)
NEUTROPHILS NFR BLD AUTO: 9.48 10*3/MM3 (ref 1.7–7)
NRBC BLD AUTO-RTO: 0 /100 WBC (ref 0–0.2)
OPIATES UR QL: NEGATIVE
OXYCODONE UR QL SCN: NEGATIVE
PCP UR QL SCN: NEGATIVE
PLATELET # BLD AUTO: 216 10*3/MM3 (ref 140–450)
PMV BLD AUTO: 9.8 FL (ref 6–12)
PROPOXYPH UR QL: NEGATIVE
RBC # BLD AUTO: 4.06 10*6/MM3 (ref 3.77–5.28)
RH BLD: NEGATIVE
TRICYCLICS UR QL SCN: NEGATIVE
WBC NRBC COR # BLD: 12.95 10*3/MM3 (ref 3.4–10.8)

## 2022-06-21 PROCEDURE — 87491 CHLMYD TRACH DNA AMP PROBE: CPT | Performed by: OBSTETRICS & GYNECOLOGY

## 2022-06-21 PROCEDURE — 86900 BLOOD TYPING SEROLOGIC ABO: CPT

## 2022-06-21 PROCEDURE — 80306 DRUG TEST PRSMV INSTRMNT: CPT

## 2022-06-21 PROCEDURE — 80081 OBSTETRIC PANEL INC HIV TSTG: CPT

## 2022-06-21 PROCEDURE — 96372 THER/PROPH/DIAG INJ SC/IM: CPT | Performed by: OBSTETRICS & GYNECOLOGY

## 2022-06-21 PROCEDURE — 36415 COLL VENOUS BLD VENIPUNCTURE: CPT

## 2022-06-21 PROCEDURE — 90715 TDAP VACCINE 7 YRS/> IM: CPT | Performed by: OBSTETRICS & GYNECOLOGY

## 2022-06-21 PROCEDURE — 86803 HEPATITIS C AB TEST: CPT

## 2022-06-21 PROCEDURE — G0432 EIA HIV-1/HIV-2 SCREEN: HCPCS

## 2022-06-21 PROCEDURE — 85025 COMPLETE CBC W/AUTO DIFF WBC: CPT

## 2022-06-21 PROCEDURE — 87340 HEPATITIS B SURFACE AG IA: CPT

## 2022-06-21 PROCEDURE — 90471 IMMUNIZATION ADMIN: CPT | Performed by: OBSTETRICS & GYNECOLOGY

## 2022-06-21 PROCEDURE — 86901 BLOOD TYPING SEROLOGIC RH(D): CPT

## 2022-06-21 PROCEDURE — 86850 RBC ANTIBODY SCREEN: CPT

## 2022-06-21 PROCEDURE — 87661 TRICHOMONAS VAGINALIS AMPLIF: CPT | Performed by: OBSTETRICS & GYNECOLOGY

## 2022-06-21 PROCEDURE — 82950 GLUCOSE TEST: CPT

## 2022-06-21 PROCEDURE — 87591 N.GONORRHOEAE DNA AMP PROB: CPT | Performed by: OBSTETRICS & GYNECOLOGY

## 2022-06-21 PROCEDURE — 0502F SUBSEQUENT PRENATAL CARE: CPT | Performed by: OBSTETRICS & GYNECOLOGY

## 2022-06-21 NOTE — PROGRESS NOTES
CC: Prenatal visit    Lissa Vargas is a 31 y.o.  at 29w2d.  Doing well.  Denies contractions, LOF, or VB.  Reports good FM.    /70   Wt 81.6 kg (180 lb)   LMP  (LMP Unknown)   BMI 31.89 kg/m²   Fundal Height (cm): 27 cm  Fetal Heart Rate: 156     Problems (from 22 to present)     Problem Noted Resolved    Supervision of high risk pregnancy in third trimester 2022 by Vida Post MD No    Rh negative status during pregnancy in third trimester 2022 by Vida Post MD No        A/P: Lissa Vargas is a 31 y.o.  at 29w2d.  - RTC in 2 weeks  - 3T labs today  - RhoGAM today  - Tdap today  - Breastpump script sent today  - Reviewed COVID-19 visitation policy  - Reviewed COVID-19 precautions     Diagnosis Plan   1. Supervision of high risk pregnancy in third trimester     2. Rh negative status during pregnancy in third trimester  Rhogam Immune Globulin Immunization   3. Need for Tdap vaccination  Tdap Vaccine Greater Than or Equal To 8yo IM   4. 29 weeks gestation of pregnancy       Vida Post MD  2022  09:34 CDT

## 2022-06-22 LAB
C TRACH RRNA CVX QL NAA+PROBE: NEGATIVE
N GONORRHOEA RRNA SPEC QL NAA+PROBE: NEGATIVE
RPR SER QL: NORMAL
RUBV IGG SERPL IA-ACNC: 2.98 INDEX
TRICHOMONAS VAGINALIS PCR: NEGATIVE

## 2022-07-05 ENCOUNTER — ROUTINE PRENATAL (OUTPATIENT)
Dept: OBSTETRICS AND GYNECOLOGY | Facility: CLINIC | Age: 32
End: 2022-07-05

## 2022-07-05 VITALS — DIASTOLIC BLOOD PRESSURE: 76 MMHG | SYSTOLIC BLOOD PRESSURE: 118 MMHG | WEIGHT: 182.2 LBS | BODY MASS INDEX: 32.28 KG/M2

## 2022-07-05 DIAGNOSIS — Z3A.31 31 WEEKS GESTATION OF PREGNANCY: ICD-10-CM

## 2022-07-05 DIAGNOSIS — O26.893 RH NEGATIVE STATUS DURING PREGNANCY IN THIRD TRIMESTER: ICD-10-CM

## 2022-07-05 DIAGNOSIS — Z67.91 RH NEGATIVE STATUS DURING PREGNANCY IN THIRD TRIMESTER: ICD-10-CM

## 2022-07-05 DIAGNOSIS — O09.93 SUPERVISION OF HIGH RISK PREGNANCY IN THIRD TRIMESTER: Primary | ICD-10-CM

## 2022-07-05 PROCEDURE — 0502F SUBSEQUENT PRENATAL CARE: CPT | Performed by: OBSTETRICS & GYNECOLOGY

## 2022-07-05 NOTE — PROGRESS NOTES
CC: Prenatal visit    Lissa Vargas is a 31 y.o.  at 31w2d.  Doing well.  Denies contractions, LOF, or VB.  Reports good FM.    /76   Wt 82.6 kg (182 lb 3.2 oz)   LMP  (LMP Unknown)   BMI 32.28 kg/m²   Fundal Height (cm): 32 cm  Fetal Heart Rate: 158     Problems (from 22 to present)     Problem Noted Resolved    Supervision of high risk pregnancy in third trimester 2022 by Vida Post MD No    Rh negative status during pregnancy in third trimester 2022 by Vida Post MD No        A/P: Lissa Vargas is a 31 y.o.  at 31w2d.  - RTC in 2 weeks in [place], 4 weeks in [place]  - Discussed delivery planning; EIOL at 39 wks to IOL for late term gestation at 41 wks  - Reviewed COVID-19 visitation policy  - Reviewed COVID-19 precautions     Diagnosis Plan   1. Supervision of high risk pregnancy in third trimester     2. Rh negative status during pregnancy in third trimester     3. 31 weeks gestation of pregnancy       Vida Post MD  2022  10:46 CDT

## 2022-07-19 ENCOUNTER — ROUTINE PRENATAL (OUTPATIENT)
Dept: OBSTETRICS AND GYNECOLOGY | Facility: CLINIC | Age: 32
End: 2022-07-19

## 2022-07-19 VITALS — BODY MASS INDEX: 32.35 KG/M2 | WEIGHT: 182.6 LBS | SYSTOLIC BLOOD PRESSURE: 110 MMHG | DIASTOLIC BLOOD PRESSURE: 68 MMHG

## 2022-07-19 DIAGNOSIS — O26.893 RH NEGATIVE STATUS DURING PREGNANCY IN THIRD TRIMESTER: ICD-10-CM

## 2022-07-19 DIAGNOSIS — Z67.91 RH NEGATIVE STATUS DURING PREGNANCY IN THIRD TRIMESTER: ICD-10-CM

## 2022-07-19 DIAGNOSIS — O09.93 SUPERVISION OF HIGH RISK PREGNANCY IN THIRD TRIMESTER: Primary | ICD-10-CM

## 2022-07-19 PROCEDURE — 0502F SUBSEQUENT PRENATAL CARE: CPT | Performed by: NURSE PRACTITIONER

## 2022-07-19 NOTE — PROGRESS NOTES
CC: Prenatal visit    Lissa Vargas is a 31 y.o.  at 33w2d.  Doing well.  Denies contractions, LOF, or VB.  Reports good FM.    /68   Wt 82.8 kg (182 lb 9.6 oz)   LMP  (LMP Unknown)   BMI 32.35 kg/m²     Fundal Height (cm): 34 cm  Fetal Heart Rate: 149     Problems (from 22 to present)     Problem Noted Resolved    Supervision of high risk pregnancy in third trimester 2022 by Vida Post MD No    Rh negative status during pregnancy in third trimester 2022 by Vida Post MD No          A/P: Lissa Vargas is a 31 y.o.  at 33w2d.  - RTC in 2 weeks scheduled already with Dr. Post   -  Rhogam . Passed 1H. Hgb 11.4  - Reviewed COVID-19 visitation policy  - Reviewed COVID-19 precautions     Diagnosis Plan   1. Supervision of high risk pregnancy in third trimester     2. Rh negative status during pregnancy in third trimester       Anya Camarena, ANNABELLE  2022  10:58 CDT

## 2022-08-02 ENCOUNTER — ROUTINE PRENATAL (OUTPATIENT)
Dept: OBSTETRICS AND GYNECOLOGY | Facility: CLINIC | Age: 32
End: 2022-08-02

## 2022-08-02 VITALS — WEIGHT: 182.6 LBS | BODY MASS INDEX: 32.35 KG/M2 | SYSTOLIC BLOOD PRESSURE: 116 MMHG | DIASTOLIC BLOOD PRESSURE: 72 MMHG

## 2022-08-02 DIAGNOSIS — O26.893 RH NEGATIVE STATUS DURING PREGNANCY IN THIRD TRIMESTER: ICD-10-CM

## 2022-08-02 DIAGNOSIS — Z67.91 RH NEGATIVE STATUS DURING PREGNANCY IN THIRD TRIMESTER: ICD-10-CM

## 2022-08-02 DIAGNOSIS — Z3A.36 36 WEEKS GESTATION OF PREGNANCY: ICD-10-CM

## 2022-08-02 DIAGNOSIS — O09.93 SUPERVISION OF HIGH RISK PREGNANCY IN THIRD TRIMESTER: Primary | ICD-10-CM

## 2022-08-02 PROCEDURE — 0502F SUBSEQUENT PRENATAL CARE: CPT | Performed by: OBSTETRICS & GYNECOLOGY

## 2022-08-02 PROCEDURE — 87653 STREP B DNA AMP PROBE: CPT | Performed by: OBSTETRICS & GYNECOLOGY

## 2022-08-02 NOTE — PROGRESS NOTES
CC: Prenatal visit    Lissa Vargas is a 31 y.o.  at 35w2d.  Doing well.  Denies contractions, LOF, or VB.  Reports good FM.    /72   Wt 82.8 kg (182 lb 9.6 oz)   LMP  (LMP Unknown)   Breastfeeding No   BMI 32.35 kg/m²   SVE: Declined     Fetal Heart Rate: 130s     Problems (from 22 to present)     Problem Noted Resolved    Supervision of high risk pregnancy in third trimester 2022 by Vida Post MD No    Rh negative status during pregnancy in third trimester 2022 by Vida Post MD No        A/P: Lissa Vargas is a 31 y.o.  at 35w2d.  - RTC in 1 week  - GBS today  - Declines EIOL  - Discussed call schedule and availability for delivery.  Voices understanding.  - Reviewed COVID-19 visitation policy  - Reviewed COVID-19 precautions     Diagnosis Plan   1. Supervision of high risk pregnancy in third trimester     2. Rh negative status during pregnancy in third trimester     3. 36 weeks gestation of pregnancy  Group B Strep (Molecular) - Swab, Vaginal/Rectum     Vida Post MD  2022  15:48 CDT

## 2022-08-03 LAB — GROUP B STREP, DNA: NEGATIVE

## 2022-08-09 ENCOUNTER — ROUTINE PRENATAL (OUTPATIENT)
Dept: OBSTETRICS AND GYNECOLOGY | Facility: CLINIC | Age: 32
End: 2022-08-09

## 2022-08-09 VITALS — BODY MASS INDEX: 32.95 KG/M2 | DIASTOLIC BLOOD PRESSURE: 66 MMHG | SYSTOLIC BLOOD PRESSURE: 118 MMHG | WEIGHT: 186 LBS

## 2022-08-09 DIAGNOSIS — Z3A.36 36 WEEKS GESTATION OF PREGNANCY: ICD-10-CM

## 2022-08-09 DIAGNOSIS — O09.93 SUPERVISION OF HIGH RISK PREGNANCY IN THIRD TRIMESTER: Primary | ICD-10-CM

## 2022-08-09 DIAGNOSIS — O26.893 RH NEGATIVE STATUS DURING PREGNANCY IN THIRD TRIMESTER: ICD-10-CM

## 2022-08-09 DIAGNOSIS — Z67.91 RH NEGATIVE STATUS DURING PREGNANCY IN THIRD TRIMESTER: ICD-10-CM

## 2022-08-09 PROCEDURE — 0502F SUBSEQUENT PRENATAL CARE: CPT | Performed by: NURSE PRACTITIONER

## 2022-08-09 NOTE — PROGRESS NOTES
CC: Prenatal visit    Lissa Vargas is a 31 y.o.  at 36w2d.  Doing well.  Denies dysuria, abnormal vaginal d/c, headaches, heartburn, constipation, cramping, regular contractions, LOF, or VB.  Reports good FM.    /66   Wt 84.4 kg (186 lb)   LMP  (LMP Unknown)   BMI 32.95 kg/m²   SVE: NA  GBS negative result discussed.  Fundal Height (cm): 36 cm  Fetal Heart Rate: 138     Problems (from 22 to present)     Problem Noted Resolved    Supervision of high risk pregnancy in third trimester 2022 by Vida Post MD No    Rh negative status during pregnancy in third trimester 2022 by Vida Post MD No          A/P: Lissa Vargas is a 31 y.o.  at 36w2d.  - RTC in 1 week  - Reviewed COVID-19 visitation policy  - Reviewed COVID-19 precautions     Diagnosis Plan   1. Supervision of high risk pregnancy in third trimester     2. Rh negative status during pregnancy in third trimester     3. 36 weeks gestation of pregnancy       ANNABELLE Almanza  2022  10:07 CDT

## 2022-08-18 ENCOUNTER — ROUTINE PRENATAL (OUTPATIENT)
Dept: OBSTETRICS AND GYNECOLOGY | Facility: CLINIC | Age: 32
End: 2022-08-18

## 2022-08-18 VITALS — SYSTOLIC BLOOD PRESSURE: 116 MMHG | BODY MASS INDEX: 32.59 KG/M2 | DIASTOLIC BLOOD PRESSURE: 70 MMHG | WEIGHT: 184 LBS

## 2022-08-18 DIAGNOSIS — O09.93 SUPERVISION OF HIGH RISK PREGNANCY IN THIRD TRIMESTER: Primary | ICD-10-CM

## 2022-08-18 DIAGNOSIS — Z67.91 RH NEGATIVE STATUS DURING PREGNANCY IN THIRD TRIMESTER: ICD-10-CM

## 2022-08-18 DIAGNOSIS — Z3A.37 37 WEEKS GESTATION OF PREGNANCY: ICD-10-CM

## 2022-08-18 DIAGNOSIS — O26.893 RH NEGATIVE STATUS DURING PREGNANCY IN THIRD TRIMESTER: ICD-10-CM

## 2022-08-18 PROCEDURE — 0502F SUBSEQUENT PRENATAL CARE: CPT | Performed by: OBSTETRICS & GYNECOLOGY

## 2022-08-19 RX ORDER — LIDOCAINE HYDROCHLORIDE 10 MG/ML
5 INJECTION, SOLUTION EPIDURAL; INFILTRATION; INTRACAUDAL; PERINEURAL AS NEEDED
Status: CANCELLED | OUTPATIENT
Start: 2022-08-19

## 2022-08-19 RX ORDER — METHYLERGONOVINE MALEATE 0.2 MG/ML
200 INJECTION INTRAVENOUS ONCE AS NEEDED
Status: CANCELLED | OUTPATIENT
Start: 2022-08-19

## 2022-08-19 RX ORDER — BUTORPHANOL TARTRATE 1 MG/ML
2 INJECTION, SOLUTION INTRAMUSCULAR; INTRAVENOUS
Status: CANCELLED | OUTPATIENT
Start: 2022-08-19

## 2022-08-19 RX ORDER — ONDANSETRON 2 MG/ML
4 INJECTION INTRAMUSCULAR; INTRAVENOUS EVERY 6 HOURS PRN
Status: CANCELLED | OUTPATIENT
Start: 2022-08-19

## 2022-08-19 RX ORDER — ACETAMINOPHEN 325 MG/1
1000 TABLET ORAL EVERY 6 HOURS
Status: CANCELLED | OUTPATIENT
Start: 2022-08-19

## 2022-08-19 RX ORDER — MISOPROSTOL 100 UG/1
800 TABLET ORAL AS NEEDED
Status: CANCELLED | OUTPATIENT
Start: 2022-08-19

## 2022-08-19 RX ORDER — PROMETHAZINE HYDROCHLORIDE 25 MG/1
12.5 SUPPOSITORY RECTAL EVERY 6 HOURS PRN
Status: CANCELLED | OUTPATIENT
Start: 2022-08-19

## 2022-08-19 RX ORDER — SODIUM CHLORIDE 0.9 % (FLUSH) 0.9 %
3-10 SYRINGE (ML) INJECTION AS NEEDED
Status: CANCELLED | OUTPATIENT
Start: 2022-08-19

## 2022-08-19 RX ORDER — SODIUM CHLORIDE, SODIUM LACTATE, POTASSIUM CHLORIDE, CALCIUM CHLORIDE 600; 310; 30; 20 MG/100ML; MG/100ML; MG/100ML; MG/100ML
125 INJECTION, SOLUTION INTRAVENOUS CONTINUOUS
Status: CANCELLED | OUTPATIENT
Start: 2022-08-19

## 2022-08-19 RX ORDER — OXYTOCIN 10 [USP'U]/ML
85 INJECTION, SOLUTION INTRAMUSCULAR; INTRAVENOUS ONCE
Status: CANCELLED | OUTPATIENT
Start: 2022-08-19 | End: 2022-08-19

## 2022-08-19 RX ORDER — ONDANSETRON 4 MG/1
4 TABLET, FILM COATED ORAL EVERY 6 HOURS PRN
Status: CANCELLED | OUTPATIENT
Start: 2022-08-19

## 2022-08-19 RX ORDER — OXYTOCIN 10 [USP'U]/ML
2-20 INJECTION, SOLUTION INTRAMUSCULAR; INTRAVENOUS
Status: CANCELLED | OUTPATIENT
Start: 2022-08-19

## 2022-08-19 RX ORDER — CARBOPROST TROMETHAMINE 250 UG/ML
250 INJECTION, SOLUTION INTRAMUSCULAR AS NEEDED
Status: CANCELLED | OUTPATIENT
Start: 2022-08-19

## 2022-08-19 RX ORDER — OXYTOCIN 10 [USP'U]/ML
650 INJECTION, SOLUTION INTRAMUSCULAR; INTRAVENOUS ONCE
Status: CANCELLED | OUTPATIENT
Start: 2022-08-19 | End: 2022-08-19

## 2022-08-19 RX ORDER — PROMETHAZINE HYDROCHLORIDE 25 MG/1
25 TABLET ORAL EVERY 6 HOURS PRN
Status: CANCELLED | OUTPATIENT
Start: 2022-08-19

## 2022-08-19 RX ORDER — BUTORPHANOL TARTRATE 1 MG/ML
1 INJECTION, SOLUTION INTRAMUSCULAR; INTRAVENOUS
Status: CANCELLED | OUTPATIENT
Start: 2022-08-19

## 2022-08-19 RX ORDER — SODIUM CHLORIDE 0.9 % (FLUSH) 0.9 %
3 SYRINGE (ML) INJECTION EVERY 12 HOURS SCHEDULED
Status: CANCELLED | OUTPATIENT
Start: 2022-08-19

## 2022-08-20 NOTE — H&P
[facility]  HISTORY & PHYSICAL - Obstetrics    Name: Lissa Vargas  MRN: [id number]  Location: Room/bed info not found  Date: 2022  [facility]: [phone]      CHIEF COMPLAINT:  EIOL at 40w6d    HISTORY OF PRESENT ILLNESS  Lissa Vargas is a 31 y.o.  at 37w4d who presents today for RPN.  She desires to wait as long as possible for IOL; scheduled for IOL at 40w6d on 9/10.  Denies contractions, LOF, or VB.  Reports good FM.    Patient denies any chest pain, palpitations, headaches, lightheadedness, shortness of breath, cough, nausea, vomiting, diarrhea, constipation, fever, or chills.    ROS  Review of Systems   Constitutional: Negative.    HENT: Negative.    Eyes: Negative.    Respiratory: Negative.    Cardiovascular: Negative.    Gastrointestinal: Negative.    Endocrine: Negative.    Genitourinary: Negative.    Musculoskeletal: Negative.    Skin: Negative.    Allergic/Immunologic: Negative.    Neurological: Negative.    Hematological: Negative.    Psychiatric/Behavioral: Negative.      PRENATAL LAB RESULTS  Prenatal labs reviewed  External Prenatal Results     Pregnancy Outside Results - Transcribed From Office Records - See Scanned Records For Details     Test Value Date Time    ABO  O  22 1031    Rh  Negative  22 1031    Antibody Screen  Negative  22 1031    Varicella IgG       Rubella  2.98 index 22 1031    Hgb  11.4 g/dL 22 1031    Hct  34.2 % 22 1031    Glucose Fasting GTT       Glucose Tolerance Test 1 hour       Glucose Tolerance Test 3 hour       Gonorrhea (discrete)  Negative  22 1031    Chlamydia (discrete)  Negative  22 1031    RPR  Non-Reactive  22 1031    VDRL       Syphilis Antibody       HBsAg  Non-Reactive  22 1031    Herpes Simplex Virus PCR       Herpes Simplex VIrus Culture       HIV  Non-Reactive  22 1031    Hep C RNA Quant PCR       Hep C Antibody  Non-Reactive  22 1031    AFP        Group B Strep  Negative  22 1540    GBS Susceptibility to Clindamycin       GBS Susceptibility to Erythromycin       Fetal Fibronectin       Genetic Testing, Maternal Blood             Drug Screening     Test Value Date Time    Urine Drug Screen       Amphetamine Screen  Negative  22 1031    Barbiturate Screen  Negative  22 1031    Benzodiazepine Screen  Negative  22 1031    Methadone Screen  Negative  22 1031    Phencyclidine Screen  Negative  22 1031    Opiates Screen  Negative  22 1031    THC Screen  Negative  22 1031    Cocaine Screen       Propoxyphene Screen  Negative  22 1031    Buprenorphine Screen  Negative  22 1031    Methamphetamine Screen       Oxycodone Screen  Negative  22 1031    Tricyclic Antidepressants Screen  Negative  22 1031          Legend    ^: Historical                      PRENATAL RISK FACTORS   Problems (from 22 to present)     Problem Noted Resolved    Supervision of high risk pregnancy in third trimester 2022 by Vida Post MD No    Rh negative status during pregnancy in third trimester 2022 by Vida Post MD No        OB HISTORY  OB History    Para Term  AB Living   2 1 1 0 0 1   SAB IAB Ectopic Molar Multiple Live Births   0 0 0 0 0 1      # Outcome Date GA Lbr Malachi/2nd Weight Sex Delivery Anes PTL Lv   2 Current            1 Term 10/09/20 40w3d 14:03 / 00:08 2830 g (6 lb 3.8 oz) M Vag-Spont EPI N MARV     GYN HISTORY  Denies h/o sexually transmitted infections/pelvic inflammatory disease  Denies h/o gynecologic surgeries, including biopsies of the cervix    PAST MEDICAL HISTORY  Past Medical History:   Diagnosis Date   • Anxiety    • Depression    • Hip pain      PAST SURGICAL HISTORY  Past Surgical History:   Procedure Laterality Date   • EAR TUBES      X 3 as a child   • WISDOM TOOTH EXTRACTION       FAMILY HISTORY  Family History    Problem Relation Age of Onset   • Cancer Mother    • Hypertension Father    • Cancer Father    • Stroke Paternal Grandfather      SOCIAL HISTORY  Social History     Socioeconomic History   • Marital status:    Tobacco Use   • Smoking status: Never Smoker   • Smokeless tobacco: Never Used   Substance and Sexual Activity   • Alcohol use: Not Currently     Comment: Rarely   • Drug use: No   • Sexual activity: Yes     Partners: Male     Birth control/protection: Pill     ALLERGIES  Allergies   Allergen Reactions   • Ibuprofen Swelling     Facial swelling     HOME MEDICATIONS  Prior to Admission medications    Medication Sig Start Date End Date Taking? Authorizing Provider   metoclopramide (Reglan) 10 MG tablet Take 1 tablet by mouth Every 6 (Six) Hours As Needed (nausea). 3/24/22  Yes Vida Post MD   ondansetron (Zofran) 4 MG tablet Take 1 tablet by mouth Every 8 (Eight) Hours As Needed for Nausea or Vomiting. 22 Yes Vida Post MD   Prenatal Vit-Fe Fumarate-FA (PRENATAL, CLASSIC, VITAMIN) 28-0.8 MG tablet tablet Take  by mouth Daily.   Yes Provider, MD Cecily     PHYSICAL EXAM  /70   Wt 83.5 kg (184 lb)   LMP  (LMP Unknown)   BMI 32.59 kg/m²   General: No acute distress. Well developed, well nourished. Pleasant.  Heart: Regular rate and rhythm. No murmurs, rubs, or gallops  Lungs: Clear to auscultation bilaterally. No wheezes, rales, or rhonchi.  Abdomen: Soft, nontender to palpation, enlarged by gravid uterus.    IMPRESSION  Lissa Vargas is a 31 y.o.  scheduled for EIOL at 40w6d.    PLAN  1.  IUP at 40w6d with IOL  - Admit: Labor and Delivery  - Attending: Dr. Post  - Condition: Stable  - Vitals: per protocol  - Activity: ad nora  - Nursing: Continuous electronic fetal monitoring, as per protocol  - Diet: Clears  - IV fluids:  mL/hr  - Meds: Pitocin  - Allergies: Ibuprofen  - Labs: CBC, T&S, UDS  - GBS: neg.  Antibiotics:  N/A  - Lissa Vargas and I have discussed pain goals for this hospitalization after reviewing her current clinical condition, medical history and prior pain experiences.  The goal is to keep her pain level appropriate.  Patient does desire an epidural.  - Anticipate     This document has been electronically signed by Vida Post MD on 2022 19:05 CDT.

## 2022-08-20 NOTE — PROGRESS NOTES
CC: Prenatal visit    Lissa Vargas is a 31 y.o.  at 37w4d.  Doing well.  Denies contractions, LOF, or VB.  Reports good FM.    /70   Wt 83.5 kg (184 lb)   LMP  (LMP Unknown)   BMI 32.59 kg/m²   SVE: Declined  Fundal Height (cm): 36 cm  Fetal Heart Rate: 147     Problems (from 22 to present)     Problem Noted Resolved    Supervision of high risk pregnancy in third trimester 2022 by Vida Post MD No    Rh negative status during pregnancy in third trimester 2022 by Vida Post MD No        A/P: Lissa Vargas is a 31 y.o.  at 37w4d.  - RTC in 1 weeks  - EIOL scheduled for  at 40w6d  - Reviewed COVID-19 visitation policy  - Reviewed COVID-19 precautions     Diagnosis Plan   1. Supervision of high risk pregnancy in third trimester     2. Rh negative status during pregnancy in third trimester     3. 37 weeks gestation of pregnancy       Vida Post MD  2022  19:02 CDT

## 2022-08-25 ENCOUNTER — ROUTINE PRENATAL (OUTPATIENT)
Dept: OBSTETRICS AND GYNECOLOGY | Facility: CLINIC | Age: 32
End: 2022-08-25

## 2022-08-25 VITALS — SYSTOLIC BLOOD PRESSURE: 118 MMHG | BODY MASS INDEX: 32.77 KG/M2 | DIASTOLIC BLOOD PRESSURE: 64 MMHG | WEIGHT: 185 LBS

## 2022-08-25 DIAGNOSIS — O36.8130 DECREASED FETAL MOVEMENTS IN THIRD TRIMESTER, SINGLE OR UNSPECIFIED FETUS: ICD-10-CM

## 2022-08-25 DIAGNOSIS — Z67.91 RH NEGATIVE STATUS DURING PREGNANCY IN THIRD TRIMESTER: ICD-10-CM

## 2022-08-25 DIAGNOSIS — Z3A.38 38 WEEKS GESTATION OF PREGNANCY: Primary | ICD-10-CM

## 2022-08-25 DIAGNOSIS — O26.893 RH NEGATIVE STATUS DURING PREGNANCY IN THIRD TRIMESTER: ICD-10-CM

## 2022-08-25 DIAGNOSIS — O09.93 SUPERVISION OF HIGH RISK PREGNANCY IN THIRD TRIMESTER: ICD-10-CM

## 2022-08-25 PROCEDURE — 59025 FETAL NON-STRESS TEST: CPT | Performed by: NURSE PRACTITIONER

## 2022-08-25 PROCEDURE — 0502F SUBSEQUENT PRENATAL CARE: CPT | Performed by: NURSE PRACTITIONER

## 2022-08-25 NOTE — PROGRESS NOTES
CC: Prenatal visit    Lissa Vargas is a 31 y.o.  at 38w4d.  Doing well.  No complaints.  Denies contractions, LOF, or VB.  Reports decrease in FM over the past few days.    /64   Wt 83.9 kg (185 lb)   LMP  (LMP Unknown)   BMI 32.77 kg/m²   SVE: 4      NST: 21 minutes, reactive, moderate variability, accelerations present, decelerations absent     Problems (from 22 to present)     Problem Noted Resolved    Supervision of high risk pregnancy in third trimester 2022 by Vida Post MD No    Rh negative status during pregnancy in third trimester 2022 by Vida Post MD No          A/P: Lissa Vargas is a 31 y.o.  at 38w4d.  - RTC in 1 weeks  Educated on FKCs, pt v/u     Diagnosis Plan   1. 38 weeks gestation of pregnancy     2. Supervision of high risk pregnancy in third trimester     3. Rh negative status during pregnancy in third trimester         ANNABELLE Tyson  2022  15:10 CDT

## 2022-09-01 ENCOUNTER — ROUTINE PRENATAL (OUTPATIENT)
Dept: OBSTETRICS AND GYNECOLOGY | Facility: CLINIC | Age: 32
End: 2022-09-01

## 2022-09-01 VITALS — DIASTOLIC BLOOD PRESSURE: 68 MMHG | SYSTOLIC BLOOD PRESSURE: 108 MMHG | BODY MASS INDEX: 32.95 KG/M2 | WEIGHT: 186 LBS

## 2022-09-01 DIAGNOSIS — O09.93 SUPERVISION OF HIGH RISK PREGNANCY IN THIRD TRIMESTER: ICD-10-CM

## 2022-09-01 DIAGNOSIS — O26.893 RH NEGATIVE STATUS DURING PREGNANCY IN THIRD TRIMESTER: ICD-10-CM

## 2022-09-01 DIAGNOSIS — Z3A.38 38 WEEKS GESTATION OF PREGNANCY: ICD-10-CM

## 2022-09-01 DIAGNOSIS — O36.8130 DECREASED FETAL MOVEMENTS IN THIRD TRIMESTER, SINGLE OR UNSPECIFIED FETUS: ICD-10-CM

## 2022-09-01 DIAGNOSIS — Z67.91 RH NEGATIVE STATUS DURING PREGNANCY IN THIRD TRIMESTER: ICD-10-CM

## 2022-09-01 DIAGNOSIS — Z3A.39 39 WEEKS GESTATION OF PREGNANCY: Primary | ICD-10-CM

## 2022-09-01 PROCEDURE — 0502F SUBSEQUENT PRENATAL CARE: CPT | Performed by: NURSE PRACTITIONER

## 2022-09-01 NOTE — PROGRESS NOTES
CC: Prenatal visit    Lissa Varags is a 31 y.o.  at 39w4d.  Doing well.  Patient reports pain in her tail bone that is stretching like in nature.  Denies contractions, LOF, or VB.  Reports good FM.    /68   Wt 84.4 kg (186 lb)   LMP  (LMP Unknown)   BMI 32.95 kg/m²   SVE: pt declines   Fundal Height (cm): 39 cm  Fetal Heart Rate: 145     Problems (from 22 to present)     Problem Noted Resolved    Supervision of high risk pregnancy in third trimester 2022 by Vida Post MD No    Rh negative status during pregnancy in third trimester 2022 by Vida Post MD No          A/P: Lissa Vargas is a 31 y.o.  at 39w4d.  Pt brought in [place] paperwork, she desires to start leave on 2022.  Note added to her paperwork of leave start date.    Discussed tail bone pain normal for her gestational age  Called L/D, they are able to store a couple bags of frozen breast milk in NICU freezer.    - RT L/D for IOL next week     Diagnosis Plan   1. 39 weeks gestation of pregnancy     2. Supervision of high risk pregnancy in third trimester     3. Rh negative status during pregnancy in third trimester         ANNABELLE Tyson  2022  16:14 CDT

## 2022-09-03 ENCOUNTER — ANESTHESIA (OUTPATIENT)
Dept: LABOR AND DELIVERY | Facility: HOSPITAL | Age: 32
End: 2022-09-03

## 2022-09-03 ENCOUNTER — ANESTHESIA EVENT (OUTPATIENT)
Dept: LABOR AND DELIVERY | Facility: HOSPITAL | Age: 32
End: 2022-09-03

## 2022-09-03 ENCOUNTER — HOSPITAL ENCOUNTER (INPATIENT)
Facility: HOSPITAL | Age: 32
LOS: 1 days | Discharge: HOME | End: 2022-09-04
Attending: OBSTETRICS & GYNECOLOGY | Admitting: OBSTETRICS & GYNECOLOGY

## 2022-09-03 DIAGNOSIS — O09.93 SUPERVISION OF HIGH RISK PREGNANCY IN THIRD TRIMESTER: ICD-10-CM

## 2022-09-03 PROBLEM — Z3A.39 39 WEEKS GESTATION OF PREGNANCY: Status: ACTIVE | Noted: 2022-09-03

## 2022-09-03 PROBLEM — O48.0 POST-TERM PREGNANCY, 40-42 WEEKS OF GESTATION: Status: ACTIVE | Noted: 2022-09-03

## 2022-09-03 PROBLEM — O48.0 POST-TERM PREGNANCY, 40-42 WEEKS OF GESTATION: Status: RESOLVED | Noted: 2022-09-03 | Resolved: 2022-09-03

## 2022-09-03 PROBLEM — Z37.9 NORMAL LABOR: Status: ACTIVE | Noted: 2022-09-03

## 2022-09-03 LAB
ABO GROUP BLD: NORMAL
AMPHET+METHAMPHET UR QL: NEGATIVE
AMPHETAMINES UR QL: NEGATIVE
BARBITURATES UR QL SCN: NEGATIVE
BENZODIAZ UR QL SCN: NEGATIVE
BLD GP AB SCN SERPL QL: NEGATIVE
BUPRENORPHINE SERPL-MCNC: NEGATIVE NG/ML
CANNABINOIDS SERPL QL: NEGATIVE
COCAINE UR QL: NEGATIVE
DEPRECATED RDW RBC AUTO: 38.9 FL (ref 37–54)
ERYTHROCYTE [DISTWIDTH] IN BLOOD BY AUTOMATED COUNT: 14.3 % (ref 12.3–15.4)
FETAL BLEED: NEGATIVE
HCT VFR BLD AUTO: 34.6 % (ref 34–46.6)
HGB BLD-MCNC: 11.2 G/DL (ref 12–15.9)
Lab: NORMAL
MCH RBC QN AUTO: 24.8 PG (ref 26.6–33)
MCHC RBC AUTO-ENTMCNC: 32.4 G/DL (ref 31.5–35.7)
MCV RBC AUTO: 76.7 FL (ref 79–97)
METHADONE UR QL SCN: NEGATIVE
NUMBER OF DOSES: NORMAL
OPIATES UR QL: NEGATIVE
OXYCODONE UR QL SCN: NEGATIVE
PCP UR QL SCN: NEGATIVE
PLATELET # BLD AUTO: 226 10*3/MM3 (ref 140–450)
PMV BLD AUTO: 10 FL (ref 6–12)
PROPOXYPH UR QL: NEGATIVE
RBC # BLD AUTO: 4.51 10*6/MM3 (ref 3.77–5.28)
RH BLD: NEGATIVE
T&S EXPIRATION DATE: NORMAL
TRICYCLICS UR QL SCN: NEGATIVE
WBC NRBC COR # BLD: 16.05 10*3/MM3 (ref 3.4–10.8)

## 2022-09-03 PROCEDURE — 86900 BLOOD TYPING SEROLOGIC ABO: CPT | Performed by: OBSTETRICS & GYNECOLOGY

## 2022-09-03 PROCEDURE — 25010000002 RHO D IMMUNE GLOBULIN 1500 UNITS SOLUTION PREFILLED SYRINGE: Performed by: OBSTETRICS & GYNECOLOGY

## 2022-09-03 PROCEDURE — C1755 CATHETER, INTRASPINAL: HCPCS

## 2022-09-03 PROCEDURE — 0KQM0ZZ REPAIR PERINEUM MUSCLE, OPEN APPROACH: ICD-10-PCS | Performed by: OBSTETRICS & GYNECOLOGY

## 2022-09-03 PROCEDURE — C1755 CATHETER, INTRASPINAL: HCPCS | Performed by: NURSE ANESTHETIST, CERTIFIED REGISTERED

## 2022-09-03 PROCEDURE — 86901 BLOOD TYPING SEROLOGIC RH(D): CPT | Performed by: OBSTETRICS & GYNECOLOGY

## 2022-09-03 PROCEDURE — 86850 RBC ANTIBODY SCREEN: CPT | Performed by: OBSTETRICS & GYNECOLOGY

## 2022-09-03 PROCEDURE — 80306 DRUG TEST PRSMV INSTRMNT: CPT | Performed by: OBSTETRICS & GYNECOLOGY

## 2022-09-03 PROCEDURE — 59400 OBSTETRICAL CARE: CPT | Performed by: OBSTETRICS & GYNECOLOGY

## 2022-09-03 PROCEDURE — 51703 INSERT BLADDER CATH COMPLEX: CPT

## 2022-09-03 PROCEDURE — 25010000002 FENTANYL CITRATE (PF) 100 MCG/2ML SOLUTION: Performed by: NURSE ANESTHETIST, CERTIFIED REGISTERED

## 2022-09-03 PROCEDURE — 85461 HEMOGLOBIN FETAL: CPT | Performed by: OBSTETRICS & GYNECOLOGY

## 2022-09-03 PROCEDURE — 85027 COMPLETE CBC AUTOMATED: CPT | Performed by: OBSTETRICS & GYNECOLOGY

## 2022-09-03 PROCEDURE — 25010000002 FENTANYL CITRATE (PF) 250 MCG/5ML SOLUTION: Performed by: NURSE ANESTHETIST, CERTIFIED REGISTERED

## 2022-09-03 RX ORDER — DIPHENHYDRAMINE HCL 25 MG
25 CAPSULE ORAL NIGHTLY PRN
Status: DISCONTINUED | OUTPATIENT
Start: 2022-09-03 | End: 2022-09-04 | Stop reason: HOSPADM

## 2022-09-03 RX ORDER — ONDANSETRON 2 MG/ML
4 INJECTION INTRAMUSCULAR; INTRAVENOUS EVERY 6 HOURS PRN
Status: DISCONTINUED | OUTPATIENT
Start: 2022-09-03 | End: 2022-09-03

## 2022-09-03 RX ORDER — ONDANSETRON 4 MG/1
4 TABLET, FILM COATED ORAL EVERY 6 HOURS PRN
Status: DISCONTINUED | OUTPATIENT
Start: 2022-09-03 | End: 2022-09-03

## 2022-09-03 RX ORDER — BUPIVACAINE HYDROCHLORIDE 2.5 MG/ML
INJECTION, SOLUTION EPIDURAL; INFILTRATION; INTRACAUDAL AS NEEDED
Status: DISCONTINUED | OUTPATIENT
Start: 2022-09-03 | End: 2022-09-03 | Stop reason: SURG

## 2022-09-03 RX ORDER — PRENATAL VIT/IRON FUM/FOLIC AC 27MG-0.8MG
1 TABLET ORAL DAILY
Status: DISCONTINUED | OUTPATIENT
Start: 2022-09-03 | End: 2022-09-04 | Stop reason: HOSPADM

## 2022-09-03 RX ORDER — HYDROCORTISONE 25 MG/G
1 CREAM TOPICAL AS NEEDED
Status: DISCONTINUED | OUTPATIENT
Start: 2022-09-03 | End: 2022-09-04 | Stop reason: HOSPADM

## 2022-09-03 RX ORDER — OXYTOCIN/0.9 % SODIUM CHLORIDE 30/500 ML
2-20 PLASTIC BAG, INJECTION (ML) INTRAVENOUS
Status: DISCONTINUED | OUTPATIENT
Start: 2022-09-03 | End: 2022-09-03

## 2022-09-03 RX ORDER — OXYTOCIN/0.9 % SODIUM CHLORIDE 30/500 ML
85 PLASTIC BAG, INJECTION (ML) INTRAVENOUS ONCE
Status: DISCONTINUED | OUTPATIENT
Start: 2022-09-03 | End: 2022-09-04 | Stop reason: HOSPADM

## 2022-09-03 RX ORDER — CARBOPROST TROMETHAMINE 250 UG/ML
250 INJECTION, SOLUTION INTRAMUSCULAR ONCE
Status: DISCONTINUED | OUTPATIENT
Start: 2022-09-03 | End: 2022-09-04 | Stop reason: HOSPADM

## 2022-09-03 RX ORDER — ONDANSETRON 4 MG/1
4 TABLET, FILM COATED ORAL EVERY 8 HOURS PRN
Status: DISCONTINUED | OUTPATIENT
Start: 2022-09-03 | End: 2022-09-04 | Stop reason: HOSPADM

## 2022-09-03 RX ORDER — OXYTOCIN/0.9 % SODIUM CHLORIDE 30/500 ML
650 PLASTIC BAG, INJECTION (ML) INTRAVENOUS ONCE
Status: DISCONTINUED | OUTPATIENT
Start: 2022-09-03 | End: 2022-09-03

## 2022-09-03 RX ORDER — FENTANYL CITRATE 50 UG/ML
INJECTION, SOLUTION INTRAMUSCULAR; INTRAVENOUS AS NEEDED
Status: DISCONTINUED | OUTPATIENT
Start: 2022-09-03 | End: 2022-09-03 | Stop reason: SURG

## 2022-09-03 RX ORDER — LIDOCAINE HYDROCHLORIDE AND EPINEPHRINE 15; 5 MG/ML; UG/ML
INJECTION, SOLUTION EPIDURAL AS NEEDED
Status: DISCONTINUED | OUTPATIENT
Start: 2022-09-03 | End: 2022-09-03 | Stop reason: SURG

## 2022-09-03 RX ORDER — PROMETHAZINE HYDROCHLORIDE 25 MG/1
25 TABLET ORAL EVERY 6 HOURS PRN
Status: DISCONTINUED | OUTPATIENT
Start: 2022-09-03 | End: 2022-09-03

## 2022-09-03 RX ORDER — HYDROCODONE BITARTRATE AND ACETAMINOPHEN 7.5; 325 MG/1; MG/1
1 TABLET ORAL EVERY 4 HOURS PRN
Status: DISCONTINUED | OUTPATIENT
Start: 2022-09-03 | End: 2022-09-04 | Stop reason: HOSPADM

## 2022-09-03 RX ORDER — OXYTOCIN/0.9 % SODIUM CHLORIDE 30/500 ML
85 PLASTIC BAG, INJECTION (ML) INTRAVENOUS ONCE
Status: DISCONTINUED | OUTPATIENT
Start: 2022-09-03 | End: 2022-09-03

## 2022-09-03 RX ORDER — OXYTOCIN/0.9 % SODIUM CHLORIDE 30/500 ML
650 PLASTIC BAG, INJECTION (ML) INTRAVENOUS ONCE
Status: COMPLETED | OUTPATIENT
Start: 2022-09-03 | End: 2022-09-04

## 2022-09-03 RX ORDER — MISOPROSTOL 200 UG/1
600 TABLET ORAL ONCE
Status: DISCONTINUED | OUTPATIENT
Start: 2022-09-03 | End: 2022-09-04 | Stop reason: HOSPADM

## 2022-09-03 RX ORDER — ACETAMINOPHEN 500 MG
1000 TABLET ORAL EVERY 6 HOURS
Status: DISCONTINUED | OUTPATIENT
Start: 2022-09-03 | End: 2022-09-03 | Stop reason: HOSPADM

## 2022-09-03 RX ORDER — DOCUSATE SODIUM 100 MG/1
100 CAPSULE, LIQUID FILLED ORAL 2 TIMES DAILY
Status: DISCONTINUED | OUTPATIENT
Start: 2022-09-03 | End: 2022-09-04 | Stop reason: HOSPADM

## 2022-09-03 RX ORDER — MISOPROSTOL 200 UG/1
800 TABLET ORAL AS NEEDED
Status: DISCONTINUED | OUTPATIENT
Start: 2022-09-03 | End: 2022-09-03 | Stop reason: HOSPADM

## 2022-09-03 RX ORDER — SODIUM CHLORIDE 0.9 % (FLUSH) 0.9 %
3 SYRINGE (ML) INJECTION EVERY 12 HOURS SCHEDULED
Status: DISCONTINUED | OUTPATIENT
Start: 2022-09-03 | End: 2022-09-03

## 2022-09-03 RX ORDER — LIDOCAINE HYDROCHLORIDE 10 MG/ML
5 INJECTION, SOLUTION EPIDURAL; INFILTRATION; INTRACAUDAL; PERINEURAL AS NEEDED
Status: DISCONTINUED | OUTPATIENT
Start: 2022-09-03 | End: 2022-09-03

## 2022-09-03 RX ORDER — SODIUM CHLORIDE 0.9 % (FLUSH) 0.9 %
3-10 SYRINGE (ML) INJECTION AS NEEDED
Status: DISCONTINUED | OUTPATIENT
Start: 2022-09-03 | End: 2022-09-03

## 2022-09-03 RX ORDER — BISACODYL 10 MG
10 SUPPOSITORY, RECTAL RECTAL DAILY PRN
Status: DISCONTINUED | OUTPATIENT
Start: 2022-09-04 | End: 2022-09-04 | Stop reason: HOSPADM

## 2022-09-03 RX ORDER — ACETAMINOPHEN 500 MG
1000 TABLET ORAL EVERY 6 HOURS PRN
Status: DISCONTINUED | OUTPATIENT
Start: 2022-09-03 | End: 2022-09-04 | Stop reason: HOSPADM

## 2022-09-03 RX ORDER — BUTORPHANOL TARTRATE 1 MG/ML
1 INJECTION, SOLUTION INTRAMUSCULAR; INTRAVENOUS
Status: DISCONTINUED | OUTPATIENT
Start: 2022-09-03 | End: 2022-09-03

## 2022-09-03 RX ORDER — METOCLOPRAMIDE 10 MG/1
10 TABLET ORAL EVERY 6 HOURS PRN
Status: DISCONTINUED | OUTPATIENT
Start: 2022-09-03 | End: 2022-09-04 | Stop reason: HOSPADM

## 2022-09-03 RX ORDER — METHYLERGONOVINE MALEATE 0.2 MG/ML
200 INJECTION INTRAVENOUS ONCE AS NEEDED
Status: DISCONTINUED | OUTPATIENT
Start: 2022-09-03 | End: 2022-09-03 | Stop reason: HOSPADM

## 2022-09-03 RX ORDER — SODIUM CHLORIDE, SODIUM LACTATE, POTASSIUM CHLORIDE, CALCIUM CHLORIDE 600; 310; 30; 20 MG/100ML; MG/100ML; MG/100ML; MG/100ML
INJECTION, SOLUTION INTRAVENOUS
Status: COMPLETED
Start: 2022-09-03 | End: 2022-09-03

## 2022-09-03 RX ORDER — CARBOPROST TROMETHAMINE 250 UG/ML
250 INJECTION, SOLUTION INTRAMUSCULAR AS NEEDED
Status: DISCONTINUED | OUTPATIENT
Start: 2022-09-03 | End: 2022-09-03 | Stop reason: HOSPADM

## 2022-09-03 RX ORDER — OXYTOCIN/0.9 % SODIUM CHLORIDE 30/500 ML
650 PLASTIC BAG, INJECTION (ML) INTRAVENOUS ONCE
Status: DISCONTINUED | OUTPATIENT
Start: 2022-09-03 | End: 2022-09-04 | Stop reason: HOSPADM

## 2022-09-03 RX ORDER — SODIUM CHLORIDE 0.9 % (FLUSH) 0.9 %
1-10 SYRINGE (ML) INJECTION AS NEEDED
Status: DISCONTINUED | OUTPATIENT
Start: 2022-09-03 | End: 2022-09-04 | Stop reason: HOSPADM

## 2022-09-03 RX ORDER — LIDOCAINE HYDROCHLORIDE 20 MG/ML
INJECTION, SOLUTION EPIDURAL; INFILTRATION; INTRACAUDAL; PERINEURAL AS NEEDED
Status: DISCONTINUED | OUTPATIENT
Start: 2022-09-03 | End: 2022-09-03 | Stop reason: SURG

## 2022-09-03 RX ORDER — PROMETHAZINE HYDROCHLORIDE 12.5 MG/1
12.5 SUPPOSITORY RECTAL EVERY 6 HOURS PRN
Status: DISCONTINUED | OUTPATIENT
Start: 2022-09-03 | End: 2022-09-03

## 2022-09-03 RX ORDER — SODIUM CHLORIDE, SODIUM LACTATE, POTASSIUM CHLORIDE, CALCIUM CHLORIDE 600; 310; 30; 20 MG/100ML; MG/100ML; MG/100ML; MG/100ML
125 INJECTION, SOLUTION INTRAVENOUS CONTINUOUS
Status: DISCONTINUED | OUTPATIENT
Start: 2022-09-03 | End: 2022-09-03

## 2022-09-03 RX ADMIN — SODIUM CHLORIDE, POTASSIUM CHLORIDE, SODIUM LACTATE AND CALCIUM CHLORIDE 125 ML/HR: 600; 310; 30; 20 INJECTION, SOLUTION INTRAVENOUS at 04:43

## 2022-09-03 RX ADMIN — SODIUM CHLORIDE, POTASSIUM CHLORIDE, SODIUM LACTATE AND CALCIUM CHLORIDE 125 ML/HR: 600; 310; 30; 20 INJECTION, SOLUTION INTRAVENOUS at 10:44

## 2022-09-03 RX ADMIN — OXYTOCIN-SODIUM CHLORIDE 0.9% IV SOLN 30 UNIT/500ML 650 ML/HR: 30-0.9/5 SOLUTION at 11:27

## 2022-09-03 RX ADMIN — BUPIVACAINE HYDROCHLORIDE 10 ML: 2.5 INJECTION, SOLUTION EPIDURAL; INFILTRATION; INTRACAUDAL; PERINEURAL at 08:44

## 2022-09-03 RX ADMIN — ACETAMINOPHEN 1000 MG: 500 TABLET, FILM COATED ORAL at 23:43

## 2022-09-03 RX ADMIN — LIDOCAINE HYDROCHLORIDE AND EPINEPHRINE 3 ML: 15; 5 INJECTION, SOLUTION EPIDURAL at 05:04

## 2022-09-03 RX ADMIN — FENTANYL CITRATE 250 MCG: 50 INJECTION, SOLUTION INTRAMUSCULAR; INTRAVENOUS at 05:09

## 2022-09-03 RX ADMIN — BUPIVACAINE HYDROCHLORIDE 5 ML: 2.5 INJECTION, SOLUTION EPIDURAL; INFILTRATION; INTRACAUDAL; PERINEURAL at 11:02

## 2022-09-03 RX ADMIN — RHO(D) IMMUNE GLOBULIN (HUMAN) 1500 UNITS: 1500 SOLUTION INTRAMUSCULAR at 20:47

## 2022-09-03 RX ADMIN — LIDOCAINE HYDROCHLORIDE 5 ML: 20 INJECTION, SOLUTION EPIDURAL; INFILTRATION; INTRACAUDAL at 05:21

## 2022-09-03 RX ADMIN — ACETAMINOPHEN 1000 MG: 500 TABLET, FILM COATED ORAL at 17:31

## 2022-09-03 RX ADMIN — DOCUSATE SODIUM 100 MG: 100 CAPSULE, LIQUID FILLED ORAL at 20:47

## 2022-09-03 RX ADMIN — FENTANYL CITRATE 100 MCG: 50 INJECTION, SOLUTION INTRAMUSCULAR; INTRAVENOUS at 11:02

## 2022-09-03 RX ADMIN — Medication: at 21:03

## 2022-09-03 RX ADMIN — OXYTOCIN-SODIUM CHLORIDE 0.9% IV SOLN 30 UNIT/500ML 85 ML/HR: 30-0.9/5 SOLUTION at 12:30

## 2022-09-03 NOTE — ANESTHESIA PROCEDURE NOTES
Labor Epidural      Patient location during procedure: OB  Performed By  CRNA/CAA: Eddie Ndiaye CRNA  Preanesthetic Checklist  Completed: patient identified, IV checked, site marked, risks and benefits discussed, surgical consent, monitors and equipment checked, pre-op evaluation and timeout performed  Prep:  Pt Position:sitting  Sterile Tech:gloves, cap, gown, mask and sterile barrier  Prep:DuraPrep  Monitoring:blood pressure monitoring and continuous pulse oximetry  Epidural Block Procedure:  Approach:midline  Guidance:landmark technique and palpation technique  Location:L3-L4  Needle Type:Tuohy  Needle Gauge:17 G  Loss of Resistance Medium: air  Loss of Resistance: 7cm  Cath Depth at skin:12 cm  Paresthesia: none  Aspiration:negative  Test Dose:negative  Number of Attempts: 1  Post Assessment:  Dressing:occlusive dressing applied and secured with tape  Pt Tolerance:patient tolerated the procedure well with no apparent complications  Complications:no

## 2022-09-03 NOTE — L&D DELIVERY NOTE
[facility]   Vaginal Delivery Note    Patient Name: Lissa Vargas  : 1990  MRN: [id number]    Date of Delivery: 9/3/2022     Diagnosis     Pre & Post-Delivery:  Intrauterine pregnancy at 39w6d  Labor status: Active spontaneous labor     Supervision of high risk pregnancy in third trimester    Rh negative status during pregnancy in third trimester    39 weeks gestation of pregnancy    Normal labor    Shoulder dystocia during labor and delivery             Problem List    Transfer to Postpartum     Review the Delivery Report for details.     Delivery     Delivery: Vaginal, Spontaneous     YOB: 2021    Time of Birth:  Gestational Age 11:17 AM   39w6d     Anesthesia: Epidural     Delivering clinician: Guilherme Diaz    Forceps?   No   Vacuum? No    Shoulder dystocia present: Yes Shoulder Dystocia Details  Dystocia Present? Yes   Time head delivered: 9/3/2022 11:16 AM   Gentle attempt at traction, assisted by maternal expulsive forces:  Yes  If no, why:    Anterior shoulder:    Time recognized: 9/3/2022 11:16 AM     Time help called:  16 Called by:  MELANIA Diaz   Time provider arrived:   Provider who arrived:     Time NICU arrived:  na NICU staff:     Time additional staff arrived:   Additional staff:            Delivery narrative: Patient progressed well in second stage of labor with large amount of encouragement by nursing staff and myself.  Patient was progressing well there were some changes in fetal heart rate noted but it was felt is progressing well there was best interest of mother and baby to proceed with vaginal delivery.  Patient delivered spontaneously vaginally over intact perineum.  On delivery of head there was some resistance of shoulders to gentle traction.  This responded to acute flexion of legs at hips (Vicente maneuver) in the shoulders then came with very gentle traction.  Infant placed on maternal abdomen cord clamped and cut by father at maternal request  infant was taken to warmer for evaluation by nursing staff.  Placenta delivered spontaneously appeared intact.  There was a second-degree perineal laceration that was repaired with best possible approximation of anatomy      Infant     Findings: male  infant     Infant observations: Weight: 3610 g (7 lb 15.3 oz)   Length: 20.5  in  Observations/Comments:        Apgars: 8  @ 1 minute /    8  @ 5 minutes          Placenta & Cord         Placenta delivered  Spontaneous  at   9/3/2022 11:26 AM     Cord: 3 vessels  present.   Nuchal Cord?  no   Cord blood obtained: Yes    Cord gases obtained:  No    Cord gas results: Venous:  No results found for: PHCVEN    Arterial:  No results found for: PHCART     Repair      Episiotomy: None     No    Lacerations: Yes  Laceration Information  Laceration Repaired?   Perineal: 2nd  Yes    Periurethral:       Labial:       Sulcus:       Vaginal:       Cervical:         Suture used for repair: 2-0 chromic gut   Estimated Blood Loss: Est. Blood Loss (mL): 200 mL (Filed from Delivery Summary) (09/03/22 1117)     Quantitative Blood Loss:200cc          Complications       Supervision of high risk pregnancy in third trimester    Rh negative status during pregnancy in third trimester    39 weeks gestation of pregnancy    Normal labor    Shoulder dystocia during labor and delivery        Disposition     Mother to Mother Baby/Postpartum  in stable condition currently.  Baby to remains with mom  in stable condition currently.    Guilherme Diaz MD  09/03/22  13:16 CDT

## 2022-09-03 NOTE — ANESTHESIA PREPROCEDURE EVALUATION
Anesthesia Evaluation     NPO Solid Status: > 8 hours  NPO Liquid Status: < 2 hours           Airway   Mallampati: II  TM distance: >3 FB  Neck ROM: full  no difficulty expected  Dental - normal exam     Pulmonary - normal exam   Cardiovascular - normal exam        Neuro/Psych  GI/Hepatic/Renal/Endo      Musculoskeletal     Abdominal    Substance History      OB/GYN    (+) Pregnant,         Other                        Anesthesia Plan    ASA 2     epidural       Anesthetic plan, risks, benefits, and alternatives have been provided, discussed and informed consent has been obtained with: patient.        CODE STATUS:    Code Status (Patient has no pulse and is not breathing): CPR (Attempt to Resuscitate)  Medical Interventions (Patient has pulse or is breathing): Full

## 2022-09-03 NOTE — H&P
[facility]  Obstetric History and Physical    Chief Complaint   Patient presents with   • Contractions     Patient stated UC started about 1230.  -vb -lof +fm           Patient is a 31 y.o. female  currently at 39w6d, who presents with uterine contractions starting about 1230 this morning.  Described as intermittent pain over the uterus at worst 6/10.  Radiating the back denies associated rupture membranes vaginal bleeding 4 cm having regular contractions felt to be in active spontaneous labor.    Her prenatal care is through [facility] records have been reviewed.  Patient denies any problems this pregnancy     Obstetric History   # 1 - Date: 10/09/20, Sex: Male, Weight: 2830 g (6 lb 3.8 oz), GA: 40w3d, Delivery: Vaginal, Spontaneous, Apgar1: 8, Apgar5: 9, Living: Living, Birth Comments: None    # 2 - Date: None, Sex: None, Weight: None, GA: None, Delivery: None, Apgar1: None, Apgar5: None, Living: None, Birth Comments: None       The following portions of the patients history were reviewed and updated as appropriate: current medications, allergies, past medical history, past surgical history, past family history, past social history and problem list .       Prenatal Information:  Prenatal Results     POC Urine Glucose/Protein     Test Value Reference Range Date Time    Urine Glucose        Urine Protein              Initial Prenatal Labs     Test Value Reference Range Date Time    Hemoglobin        Hematocrit        Platelets        Rubella IgG  2.98 index Immune >0.99 22 1031    Hepatitis B SAg  Non-Reactive  Non-Reactive 22 1031    Hepatitis C Ab  Non-Reactive  Non-Reactive 22 1031    RPR  Non-Reactive  Non-Reactive 22 1031    ABO  O   22 1031    Rh  Negative   22 1031    Antibody Screen        HIV  Non-Reactive  Non-Reactive 22 1031    Urine Culture  No growth   20 1601    Gonorrhea  Negative  Negative 22 1031     Chlamydia  Negative  Negative 06/21/22 1031    TSH  2.930 uIU/mL 0.270 - 4.200 02/03/20 1635    HgB A1c               2nd and 3rd Trimester     Test Value Reference Range Date Time    Hemoglobin (repeated)  11.2 g/dL 12.0 - 15.9 09/03/22 0442       11.4 g/dL 12.0 - 15.9 06/21/22 1031    Hematocrit (repeated)  34.6 % 34.0 - 46.6 09/03/22 0442       34.2 % 34.0 - 46.6 06/21/22 1031    Platelets   226 10*3/mm3 140 - 450 09/03/22 0442       216 10*3/mm3 140 - 450 06/21/22 1031    GCT  68 mg/dL 65 - 139 06/21/22 1031    Antibody Screen (repeated)  Negative   06/21/22 1031    GTT Fasting        GTT 1 Hr        GTT 2 Hr        GTT 3 Hr        Group B Strep  Negative  Negative 08/02/22 1540          Drug Screening     Test Value Reference Range Date Time    Amphetamine Screen  Negative  Negative 09/03/22 0449       Negative  Negative 06/21/22 1031    Barbiturate Screen  Negative  Negative 09/03/22 0449       Negative  Negative 06/21/22 1031    Benzodiazepine Screen  Negative  Negative 09/03/22 0449       Negative  Negative 06/21/22 1031    Methadone Screen  Negative  Negative 09/03/22 0449       Negative  Negative 06/21/22 1031    Phencyclidine Screen  Negative  Negative 09/03/22 0449       Negative  Negative 06/21/22 1031    Opiates Screen  Negative  Negative 09/03/22 0449       Negative  Negative 06/21/22 1031    THC Screen  Negative  Negative 09/03/22 0449       Negative  Negative 06/21/22 1031    Cocaine Screen  Negative  Negative 09/03/22 0449       Negative  Negative 06/21/22 1031    Propoxyphene Screen  Negative  Negative 09/03/22 0449       Negative  Negative 06/21/22 1031    Buprenorphine Screen  Negative  Negative 09/03/22 0449       Negative  Negative 06/21/22 1031    Methamphetamine Screen  Negative  Negative 09/03/22 0449       Negative  Negative 06/21/22 1031    Oxycodone Screen  Negative  Negative 09/03/22 0449       Negative  Negative 06/21/22 1031    Tricyclic Antidepressants Screen  Negative  Negative  09/03/22 0449       Negative  Negative 06/21/22 1031          Other (Risk screening)     Test Value Reference Range Date Time    Varicella IgG        Parvovirus IgG        CMV IgG        Cystic Fibrosis        Hemoglobin electrophoresis        NIPT        MSAFP-4        AFP (for NTD only)              Legend    ^: Historical                      External Prenatal Results     Pregnancy Outside Results - Transcribed From Office Records - See Scanned Records For Details     Test Value Date Time    ABO  O  06/21/22 1031    Rh  Negative  06/21/22 1031    Antibody Screen  Negative  06/21/22 1031    Varicella IgG       Rubella  2.98 index 06/21/22 1031    Hgb  11.2 g/dL 09/03/22 0442       11.4 g/dL 06/21/22 1031    Hct  34.6 % 09/03/22 0442       34.2 % 06/21/22 1031    Glucose Fasting GTT       Glucose Tolerance Test 1 hour       Glucose Tolerance Test 3 hour       Gonorrhea (discrete)  Negative  06/21/22 1031    Chlamydia (discrete)  Negative  06/21/22 1031    RPR  Non-Reactive  06/21/22 1031    VDRL       Syphilis Antibody       HBsAg  Non-Reactive  06/21/22 1031    Herpes Simplex Virus PCR       Herpes Simplex VIrus Culture       HIV  Non-Reactive  06/21/22 1031    Hep C RNA Quant PCR       Hep C Antibody  Non-Reactive  06/21/22 1031    AFP       Group B Strep  Negative  08/02/22 1540    GBS Susceptibility to Clindamycin       GBS Susceptibility to Erythromycin       Fetal Fibronectin       Genetic Testing, Maternal Blood             Drug Screening     Test Value Date Time    Urine Drug Screen       Amphetamine Screen  Negative  09/03/22 0449       Negative  06/21/22 1031    Barbiturate Screen  Negative  09/03/22 0449       Negative  06/21/22 1031    Benzodiazepine Screen  Negative  09/03/22 0449       Negative  06/21/22 1031    Methadone Screen  Negative  09/03/22 0449       Negative  06/21/22 1031    Phencyclidine Screen  Negative  09/03/22 0449       Negative  06/21/22 1031    Opiates Screen  Negative  09/03/22  0449       Negative  22 1031    THC Screen  Negative  22 0449       Negative  22 1031    Cocaine Screen       Propoxyphene Screen  Negative  22 0449       Negative  22 1031    Buprenorphine Screen  Negative  22 0449       Negative  22 1031    Methamphetamine Screen       Oxycodone Screen  Negative  22 0449       Negative  22 1031    Tricyclic Antidepressants Screen  Negative  22 0449       Negative  22 1031          Legend    ^: Historical                         Past OB History:     OB History    Para Term  AB Living   2 1 1 0 0 1   SAB IAB Ectopic Molar Multiple Live Births   0 0 0 0 0 1      # Outcome Date GA Lbr Malachi/2nd Weight Sex Delivery Anes PTL Lv   2 Current            1 Term 10/09/20 40w3d 14:03 / 00:08 2830 g (6 lb 3.8 oz) M Vag-Spont EPI N MARV      Name: TRISHA MELENDEZ      Apgar1: 8  Apgar5: 9        ALLERGIES:     Allergies   Allergen Reactions   • Ibuprofen Swelling     Facial swelling        Home Medications:     Prior to Admission medications    Medication Sig Start Date End Date Taking? Authorizing Provider   Prenatal Vit-Fe Fumarate-FA (PRENATAL, CLASSIC, VITAMIN) 28-0.8 MG tablet tablet Take  by mouth Daily.   Yes Provider, MD Cecily   metoclopramide (Reglan) 10 MG tablet Take 1 tablet by mouth Every 6 (Six) Hours As Needed (nausea). 3/24/22   Vida Post MD   ondansetron (Zofran) 4 MG tablet Take 1 tablet by mouth Every 8 (Eight) Hours As Needed for Nausea or Vomiting. 22  Vida Post MD       Past Medical History: Past Medical History:   Diagnosis Date   • Anxiety    • Depression    • Hip pain       Past Surgical History Past Surgical History:   Procedure Laterality Date   • EAR TUBES      X 3 as a child   • WISDOM TOOTH EXTRACTION        Family History: Family History   Problem Relation Age of Onset   • Cancer Mother    • Hypertension Father    • Cancer  Father    • Stroke Paternal Grandfather       Social History:  reports that she has never smoked. She has never used smokeless tobacco.   reports previous alcohol use.   reports no history of drug use.        Review of Systems                                                                                                                      Constitutional: Denies night sweats    HENT: No hearing changes, denies ear pain    Eye: No eye pain; no foreign body in eye    Pulmonary: No hemoptysis    Cardiovascular: No claudication    GI: No hematemesis    Musculoskeletal: No arthralgias, no joint swelling    Endocrine: No polydipsia or polyuria    Hematologic: Denies any free bleeding    Psychiatric: Denies any delusions      Objective     Documented Vitals    09/03/22 0500   BP: 123/56   Pulse: 105   Resp: 18   SpO2: 97%          OBGyn Exam  Constitutional: Appears to be in no acute distress; Eyes: sclera normal; Endocrine system: thyroid palpate is normal; Pulmonary system: lungs clear; Cardiovascular system: heart regular rate and rhythm; Gastrointestinal system: abdomen soft nontender, active bowel sounds; Urologic system: CVA negative; Psychiatric: appropriate insight; Neurologic: gait within normal limits       Last Labs  Lab Results   Component Value Date    WBC 16.05 (H) 09/03/2022    RBC 4.51 09/03/2022    HGB 11.2 (L) 09/03/2022    HCT 34.6 09/03/2022    MCV 76.7 (L) 09/03/2022    MCH 24.8 (L) 09/03/2022    MCHC 32.4 09/03/2022    RDW 14.3 09/03/2022    RDWSD 38.9 09/03/2022    MPV 10.0 09/03/2022     09/03/2022        Lab Results   Component Value Date    GLUCOSE 77 06/19/2019    BUN 12 06/19/2019    CREATININE 1.07 (H) 06/19/2019     06/19/2019    K 4.1 06/19/2019     06/19/2019    CO2 28.0 06/19/2019    CALCIUM 9.3 06/19/2019    PROTEINTOT 8.0 06/19/2019    ALBUMIN 4.00 06/19/2019    ALT 21 06/19/2019    AST 21 06/19/2019    ALKPHOS 105 06/19/2019    BILITOT 0.4 06/19/2019    EGFRIFNONA 62  (L) 2019    GLOB 4.0 (H) 2019    AGRATIO 1.0 (L) 2019    BCR 11.2 2019    ANIONGAP 11.0 2019       No results found for: HCGQUAL      Assessment/Plan:  1. 31 y.o. [id number].  Patient presents in active spontaneous labor 4 cm.  Her prenatal records have been reviewed.  She is requested an an epidural has been placed    Lissa Vargas and I have discussed pain goals for this hospitalization after reviewing her current clinical condition, medical history and prior pain experiences.  The goal is to keep her pain level 3.  To help achieve this, I plan to multi modal pain management.       This document has been electronically signed by Guilherme Diaz MD on September 3, 2022 05:54 CDT\    Please note that portions of this note were completed with a voice recognition program.

## 2022-09-03 NOTE — ANESTHESIA POSTPROCEDURE EVALUATION
Patient: Lissa Vargas    Procedure Summary     Date: 09/03/22 Room / Location:     Anesthesia Start: 0458 Anesthesia Stop: 1117    Procedure: LABOR ANALGESIA Diagnosis:     Scheduled Providers:  Provider: Eddie Ndiaye CRNA    Anesthesia Type: epidural ASA Status: 2          Anesthesia Type: epidural    Vitals  Vitals Value Taken Time   /59 09/03/22 1237   Temp 99.2 °F (37.3 °C) 09/03/22 1137   Pulse 89 09/03/22 1237   Resp 18 09/03/22 0500   SpO2 99 % 09/03/22 0510   Vitals shown include unvalidated device data.        Post Anesthesia Care and Evaluation    Patient location during evaluation: bedside  Patient participation: complete - patient participated  Level of consciousness: awake and alert  Pain score: 0  Pain management: adequate    Airway patency: patent  Anesthetic complications: No anesthetic complications  PONV Status: none  Cardiovascular status: acceptable  Respiratory status: acceptable  Hydration status: acceptable  Post Neuraxial Block status: Motor and sensory function returned to baseline and No signs or symptoms of PDPH  Comments: --------------------            09/03/22               1237     --------------------   BP:       122/59     Pulse:      89       Resp:                Temp:                SpO2:               --------------------

## 2022-09-04 VITALS
SYSTOLIC BLOOD PRESSURE: 120 MMHG | WEIGHT: 189 LBS | DIASTOLIC BLOOD PRESSURE: 70 MMHG | BODY MASS INDEX: 33.49 KG/M2 | TEMPERATURE: 98.3 F | HEIGHT: 63 IN | OXYGEN SATURATION: 99 % | RESPIRATION RATE: 16 BRPM | HEART RATE: 93 BPM

## 2022-09-04 LAB
BASOPHILS # BLD AUTO: 0.05 10*3/MM3 (ref 0–0.2)
BASOPHILS NFR BLD AUTO: 0.4 % (ref 0–1.5)
DEPRECATED RDW RBC AUTO: 41.6 FL (ref 37–54)
EOSINOPHIL # BLD AUTO: 0.09 10*3/MM3 (ref 0–0.4)
EOSINOPHIL NFR BLD AUTO: 0.7 % (ref 0.3–6.2)
ERYTHROCYTE [DISTWIDTH] IN BLOOD BY AUTOMATED COUNT: 14.5 % (ref 12.3–15.4)
HCT VFR BLD AUTO: 29 % (ref 34–46.6)
HGB BLD-MCNC: 9.1 G/DL (ref 12–15.9)
IMM GRANULOCYTES # BLD AUTO: 0.08 10*3/MM3 (ref 0–0.05)
IMM GRANULOCYTES NFR BLD AUTO: 0.7 % (ref 0–0.5)
LYMPHOCYTES # BLD AUTO: 3.07 10*3/MM3 (ref 0.7–3.1)
LYMPHOCYTES NFR BLD AUTO: 25.2 % (ref 19.6–45.3)
MCH RBC QN AUTO: 24.9 PG (ref 26.6–33)
MCHC RBC AUTO-ENTMCNC: 31.4 G/DL (ref 31.5–35.7)
MCV RBC AUTO: 79.2 FL (ref 79–97)
MONOCYTES # BLD AUTO: 0.68 10*3/MM3 (ref 0.1–0.9)
MONOCYTES NFR BLD AUTO: 5.6 % (ref 5–12)
NEUTROPHILS NFR BLD AUTO: 67.4 % (ref 42.7–76)
NEUTROPHILS NFR BLD AUTO: 8.19 10*3/MM3 (ref 1.7–7)
NRBC BLD AUTO-RTO: 0 /100 WBC (ref 0–0.2)
PLATELET # BLD AUTO: 183 10*3/MM3 (ref 140–450)
PMV BLD AUTO: 10.2 FL (ref 6–12)
RBC # BLD AUTO: 3.66 10*6/MM3 (ref 3.77–5.28)
WBC NRBC COR # BLD: 12.16 10*3/MM3 (ref 3.4–10.8)

## 2022-09-04 PROCEDURE — 0503F POSTPARTUM CARE VISIT: CPT | Performed by: OBSTETRICS & GYNECOLOGY

## 2022-09-04 PROCEDURE — 85025 COMPLETE CBC W/AUTO DIFF WBC: CPT | Performed by: OBSTETRICS & GYNECOLOGY

## 2022-09-04 RX ORDER — ACETAMINOPHEN 500 MG
1000 TABLET ORAL EVERY 6 HOURS PRN
Qty: 60 TABLET | Refills: 2 | Status: SHIPPED | OUTPATIENT
Start: 2022-09-04 | End: 2022-10-18

## 2022-09-04 RX ADMIN — ACETAMINOPHEN 1000 MG: 500 TABLET, FILM COATED ORAL at 05:57

## 2022-09-04 RX ADMIN — DOCUSATE SODIUM 100 MG: 100 CAPSULE, LIQUID FILLED ORAL at 08:38

## 2022-09-04 RX ADMIN — HYDROCODONE BITARTRATE AND ACETAMINOPHEN 1 TABLET: 7.5; 325 TABLET ORAL at 18:39

## 2022-09-04 RX ADMIN — ACETAMINOPHEN 1000 MG: 500 TABLET, FILM COATED ORAL at 12:48

## 2022-09-04 NOTE — PLAN OF CARE
Problem: Adult Inpatient Plan of Care  Goal: Plan of Care Review  Outcome: Ongoing, Progressing  Flowsheets (Taken 9/3/2022 1931)  Progress: improving  Plan of Care Reviewed With: patient  Outcome Evaluation: vss, stood to pivot to wheelchair to go to bathroom. Right leg still numb, urinated without difficulty, breastfeeding well.   Goal Outcome Evaluation:  Plan of Care Reviewed With: patient        Progress: improving  Outcome Evaluation: vss, stood to pivot to wheelchair to go to bathroom. Right leg still numb, urinated without difficulty, breastfeeding well.

## 2022-09-04 NOTE — PLAN OF CARE
Problem: Adult Inpatient Plan of Care  Goal: Plan of Care Review  9/4/2022 1832 by Clarita Breen RN  Outcome: Ongoing, Progressing  Flowsheets (Taken 9/4/2022 1832)  Progress: improving  Plan of Care Reviewed With: patient  Outcome Evaluation: VSS, AMBULATING IN ROOM, BREASTFEEDING WELL, BONDING WELL WITH INFANT, READY FOR D/C   Goal Outcome Evaluation:  Plan of Care Reviewed With: patient        Progress: improving  Outcome Evaluation: VSS, AMBULATING IN ROOM, BREASTFEEDING WELL, BONDING WELL WITH INFANT, READY FOR D/C

## 2022-09-04 NOTE — DISCHARGE INSTR - APPOINTMENTS
[facility] after hours [facility] for [facility] to call you to schedule a postpartum follow up appointment to see ANNABELLE Tyson in 2 weeks. If they do not call you in an appropriate amount of time please call their office to schedule.

## 2022-09-04 NOTE — DISCHARGE INSTR - ACTIVITY
"Notify Dr of... heavy bleeding, passing clots, foul odor to your discharge, temperature above 100.4, burning when urinating, gapping or drainage from incision or episiotomy, or for pain not relieved by taking pain medication, redness or streaking in breasts, pain or redness in legs.    Take all medications as prescribed.  Continue taking iron or prenatal vitamin while breastfeeding or until you run out.  Take rest periods several times during the day.  \"Baby Blues\" are normal and may be present around the 3rd-4th day after delivery. If they last longer than 2-3 days, please let your Dr know.  Pelvic rest for 6 weeks. No douching, tampons, or intercourse  No driving for 2 weeks  No lifting anything heavier than the baby  Wear a good supportive bra 24 hours/day to prevent engorgement     "

## 2022-09-04 NOTE — PROGRESS NOTES
[facility]  Lissa Vargas  : 1990  MRN: [id number]  CSN: [id number]    Postpartum Day #1  Subjective   The patient has no complaints      Objective     Min/max vitals past 24 hours:   Temp  Min: 97.5 °F (36.4 °C)  Max: 98.4 °F (36.9 °C)  BP  Min: 101/56  Max: 115/57  Pulse  Min: 76  Max: 104  Pulse  Min: 76  Max: 104        Abdomen: soft, nontender; bowel sounds normal; no masses   fundus firm and nontender   Calves: Nontender, no cords palpable   Pelvic: deferred     Lab Results   Component Value Date    WBC 12.16 (H) 2022    HGB 9.1 (L) 2022    HCT 29.0 (L) 2022    MCV 79.2 2022     2022    RH Negative 2022    HEPBSAG Non-Reactive 2022        Assessment   1. Postpartum Day #1 S/P vaginal delivery     Plan   1. Continue routine postpartum care          This document has been electronically signed by Guilherme Diaz MD on 2022 13:24 CDT

## 2022-09-04 NOTE — DISCHARGE SUMMARY
"[facility]  Discharge Summary  Patient Name: Lissa Vargas  : 1990  MRN: [id number]  [facility]: [id number]    Discharge Summary    Date of Admission: 9/3/2022   Date of Discharge: 2022    Principle Discharge Dx: Active Hospital Problems    Diagnosis  POA    39 weeks gestation of pregnancy [Z3A.39]  Not Applicable    Normal labor [O80, Z37.9]  Not Applicable    Shoulder dystocia during labor and delivery [O66.0]  Unknown     Mild shoulder dystocia responded to Vicente maneuver      Rh negative status during pregnancy in third trimester [O26.893, Z67.91]  Yes    Supervision of high risk pregnancy in third trimester [O09.93]  Not Applicable      Procedures Performed:    Brief History: Patient is a 31 y.o. now  who presented to labor and delivery at 39w6d.   Hospital Course: Patient presented on 9/3/22 .  She had a .  Her postpartum course was unremarkable.  On PPD #1 she expressed the desire for discharge.  She had passed gas and was urinating normally.  She was eating a regular diet without difficulty.  She was ambulating well.  Discharge instructions were given.  All questions were answered   Condition:  Discharge Activity:  Discharge Diet: Stable  Activity Instructions       Pelvic Rest     Additional Activity Instructions:    Notify Dr of... heavy bleeding, passing clots, foul odor to your discharge, temperature above 100.4, burning when urinating, gapping or drainage from incision or episiotomy, or for pain not relieved by taking pain medication, redness or streaking in breasts, pain or redness in legs.    Take all medications as prescribed.  Continue taking iron or prenatal vitamin while breastfeeding or until you run out.  Take rest periods several times during the day.  \"Baby Blues\" are normal and may be present around the 3rd-4th day after delivery. If they last longer than 2-3 days, please let your Dr know.  Pelvic rest for 6 weeks. No douching, " tampons, or intercourse  No driving for 2 weeks  No lifting anything heavier than the baby  Wear a good supportive bra 24 hours/day to prevent engorgement          Regular   Discharge Medications:    Your medication list        START taking these medications        Instructions Last Dose Given Next Dose Due   acetaminophen 500 MG tablet  Commonly known as: TYLENOL      Take 2 tablets by mouth Every 6 (Six) Hours As Needed for Mild Pain.       magnesium hydroxide 2400 MG/10ML suspension suspension  Commonly known as: MILK OF MAGNESIA      Take 10 mL by mouth Daily As Needed (bowel dysfunction).              CONTINUE taking these medications        Instructions Last Dose Given Next Dose Due   prenatal (CLASSIC) vitamin  tablet  Generic drug: prenatal vitamin      Take  by mouth Daily.              STOP taking these medications      metoclopramide 10 MG tablet  Commonly known as: Reglan        ondansetron 4 MG tablet  Commonly known as: Zofran                  Where to Get Your Medications        These medications were sent to [facility]  [address] [place] [address]      Hours: Monday through Friday 7:00am to 5:00pm Phone: [phone]   acetaminophen 500 MG tablet       Information about where to get these medications is not yet available    Ask your nurse or doctor about these medications  magnesium hydroxide 2400 MG/10ML suspension suspension        Discharge Disposition: Home   Follow-up: Future Appointments   Date Time Provider Department Center   9/8/2022  5:00 AM MAD LD INDUCTION ROOM [facility] MAD LDP MAD     2 week PP visit (telephone)  6 week PP visit     <30 minutes was spent with the patient on the day of discharge.          This document has been electronically signed by Patricia De Souza MD on September 4, 2022 10:10 CDT  I have seen and evaluated the patient.  I have discussed the case with the resident. I have reviewed the notes, assessment and plan,  and/or procedures performed by the resident. I concur with the resident’s documentation.        This document has been electronically signed by Guilherme Diaz MD on September 4, 2022 13:46 CDT

## 2022-09-04 NOTE — PLAN OF CARE
Problem: Adult Inpatient Plan of Care  Goal: Plan of Care Review  Outcome: Ongoing, Progressing  Flowsheets  Taken 9/4/2022 0318 by Veronica Mullen, RN  Outcome Evaluation: VSS, ambulating in room and voiding without difficulty. Pain controlled with PRN Acetaminophen. Using personal breast pump.  Taken 9/3/2022 1931 by Clarita Breen, RN  Progress: improving  Plan of Care Reviewed With: patient   Goal Outcome Evaluation:              Outcome Evaluation: VSS, ambulating in room and voiding without difficulty. Pain controlled with PRN Acetaminophen. Using personal breast pump.

## 2022-09-04 NOTE — PLAN OF CARE
Problem: Adult Inpatient Plan of Care  Goal: Plan of Care Review  Outcome: Ongoing, Progressing  Flowsheets (Taken 9/4/2022 6168)  Progress: improving  Plan of Care Reviewed With: patient  Outcome Evaluation:   ELEVATED BLOOD PRESSURES THIS MORE   PHYSICIAN ADDED ON PROCARDIA XL 30 MG, BLOOD PRESSURES STABLE AT THIS TIME, PUMPING, HOME PUMP GIVEN, WANTS ABD BINDER, AMBULATING TO NICU, REFUSED RHOGAM AFTER FURTHER EDUCATING WITH PHYSICIAN AND NURSES, PAIN DOING WELL   Goal Outcome Evaluation:  Plan of Care Reviewed With: patient        Progress: improving  Outcome Evaluation: ELEVATED BLOOD PRESSURES THIS MORE; PHYSICIAN ADDED ON PROCARDIA XL 30 MG, BLOOD PRESSURES STABLE AT THIS TIME, PUMPING, HOME PUMP GIVEN, WANTS ABD BINDER, AMBULATING TO NICU, REFUSED RHOGAM AFTER FURTHER EDUCATING WITH PHYSICIAN AND NURSES, PAIN DOING WELL

## 2022-09-20 ENCOUNTER — POSTPARTUM VISIT (OUTPATIENT)
Dept: OBSTETRICS AND GYNECOLOGY | Facility: CLINIC | Age: 32
End: 2022-09-20

## 2022-09-20 VITALS
SYSTOLIC BLOOD PRESSURE: 116 MMHG | DIASTOLIC BLOOD PRESSURE: 68 MMHG | WEIGHT: 168 LBS | BODY MASS INDEX: 29.77 KG/M2 | HEIGHT: 63 IN

## 2022-09-20 PROBLEM — Z3A.39 39 WEEKS GESTATION OF PREGNANCY: Status: RESOLVED | Noted: 2022-09-03 | Resolved: 2022-09-20

## 2022-09-20 PROCEDURE — 0503F POSTPARTUM CARE VISIT: CPT | Performed by: NURSE PRACTITIONER

## 2022-09-20 NOTE — PROGRESS NOTES
"Subjective   Chief Complaint   Patient presents with   • Postpartum Care     Lissa Vargas is a 31 y.o. year old  presenting to be seen for her postpartum visit.  She had a vaginal delivery.   Prenatal course was been benign.    Since delivery she has not been sexually active.  She does not have concerns about post-partum blues/depression.   She is exclusively breastfeeding and plans to continue.    The following portions of the patient's history were reviewed and updated as appropriate:current medications and allergies    Social History    Tobacco Use      Smoking status: Never Smoker      Smokeless tobacco: Never Used    Review of Systems   Constitutional: Negative for chills, diaphoresis, fatigue, fever and unexpected weight change.   Respiratory: Negative for apnea, chest tightness and shortness of breath.    Cardiovascular: Negative for chest pain and palpitations.   Gastrointestinal: Negative for abdominal distention, abdominal pain, constipation and diarrhea.   Genitourinary: Negative for decreased urine volume, difficulty urinating, dysuria, enuresis, flank pain, frequency, genital sores, hematuria, pelvic pain, urgency, vaginal bleeding, vaginal discharge and vaginal pain.   Skin: Negative for rash.   Neurological: Negative for headaches.   Psychiatric/Behavioral: Negative for sleep disturbance and suicidal ideas.         Objective   /68   Ht 160 cm (63\")   Wt 76.2 kg (168 lb)   LMP  (LMP Unknown)   BMI 29.76 kg/m²     General:  well developed; well nourished  no acute distress   Abdomen: soft, non-tender; no masses  no umbilical or inguinal hernias are present  no hepato-splenomegaly  fundus firm and non-tender  Normal findings: soft, non-tender and bowel sounds normal   Pelvis: Not performed.            Diagnoses and all orders for this visit:    Postpartum follow-up      Desires POP for contraception- will send in at 6 wk pp.  RTC for 6 wk pp follow up.  No orders of the defined " types were placed in this encounter.        This note was electronically signed.    ANNABELLE Tyson  September 20, 2022

## 2022-10-17 PROBLEM — Z67.91 RH NEGATIVE STATUS DURING PREGNANCY IN THIRD TRIMESTER: Status: RESOLVED | Noted: 2022-01-27 | Resolved: 2022-09-03

## 2022-10-17 PROBLEM — O26.893 RH NEGATIVE STATUS DURING PREGNANCY IN THIRD TRIMESTER: Status: RESOLVED | Noted: 2022-01-27 | Resolved: 2022-09-03

## 2022-10-17 PROBLEM — O09.93 SUPERVISION OF HIGH RISK PREGNANCY IN THIRD TRIMESTER: Status: RESOLVED | Noted: 2022-01-27 | Resolved: 2022-09-03

## 2022-10-18 ENCOUNTER — POSTPARTUM VISIT (OUTPATIENT)
Dept: OBSTETRICS AND GYNECOLOGY | Facility: CLINIC | Age: 32
End: 2022-10-18

## 2022-10-18 VITALS — DIASTOLIC BLOOD PRESSURE: 64 MMHG | BODY MASS INDEX: 29.72 KG/M2 | SYSTOLIC BLOOD PRESSURE: 118 MMHG | WEIGHT: 167.8 LBS

## 2022-10-18 PROCEDURE — 0503F POSTPARTUM CARE VISIT: CPT | Performed by: OBSTETRICS & GYNECOLOGY

## 2022-10-18 RX ORDER — ACETAMINOPHEN AND CODEINE PHOSPHATE 120; 12 MG/5ML; MG/5ML
1 SOLUTION ORAL DAILY
Qty: 28 TABLET | Refills: 12 | Status: SHIPPED | OUTPATIENT
Start: 2022-10-18 | End: 2023-10-18

## 2022-10-20 PROBLEM — Z37.9 NORMAL LABOR: Status: RESOLVED | Noted: 2022-09-03 | Resolved: 2022-10-20

## 2022-10-20 NOTE — PROGRESS NOTES
[facility]  Obstetrics  Date of Service: 10/18/2022    CC: Postpartum visit      Lissa Vargas is a 31 y.o.  s/p Vaginal, Spontaneous on 9/3/2022 at 39w6d secondary to term labor who presents today for postpartum check.  Delivery complicated by shoulder dystocia.  The patient states she is doing well.  Patient denies postpartum depression.  Menstrual cycles have not resumed.  Breastfeeding.  She has not resumed sexual intercourse.  Denies bowel or bladder issues.    PHYSICAL EXAM:    /64 (BP Location: Left arm, Patient Position: Sitting, Cuff Size: Adult)   Wt 76.1 kg (167 lb 12.8 oz)   LMP  (LMP Unknown)   Breastfeeding Yes   BMI 29.72 kg/m²   Abdomen: +BS, benign, no masses, soft, non-tender.  Extremities: No deep calf tenderness.  Postpartum Depression Screening Questionnaire: 0    IMPRESSION/PLAN: Lissa Vargas is a 31 y.o.  s/p Vaginal, Spontaneous on 9/3.  Doing well.  - Recovered nicely from her delivery  - Contraception: POPs  - Restrictions lifted  - RTC for annual visit w/ pap smear    This document has been electronically signed by Vida Post MD on 2022 08:14 CDT.

## 2023-01-13 ENCOUNTER — OFFICE VISIT (OUTPATIENT)
Dept: OBSTETRICS AND GYNECOLOGY | Facility: CLINIC | Age: 33
End: 2023-01-13
Payer: COMMERCIAL

## 2023-01-13 VITALS
DIASTOLIC BLOOD PRESSURE: 80 MMHG | SYSTOLIC BLOOD PRESSURE: 122 MMHG | HEIGHT: 62 IN | WEIGHT: 159 LBS | BODY MASS INDEX: 29.26 KG/M2

## 2023-01-13 DIAGNOSIS — F41.9 ANXIETY: ICD-10-CM

## 2023-01-13 DIAGNOSIS — N61.0 ACUTE MASTITIS: Primary | ICD-10-CM

## 2023-01-13 PROCEDURE — 99214 OFFICE O/P EST MOD 30 MIN: CPT | Performed by: OBSTETRICS & GYNECOLOGY

## 2023-01-13 RX ORDER — ONDANSETRON 4 MG/1
4 TABLET, FILM COATED ORAL EVERY 8 HOURS PRN
Qty: 30 TABLET | Refills: 1 | Status: SHIPPED | OUTPATIENT
Start: 2023-01-13 | End: 2023-02-24

## 2023-01-13 RX ORDER — BUPROPION HYDROCHLORIDE 150 MG/1
150 TABLET ORAL EVERY MORNING
Qty: 30 TABLET | Refills: 6 | Status: SHIPPED | OUTPATIENT
Start: 2023-01-13 | End: 2023-02-07

## 2023-01-13 NOTE — PROGRESS NOTES
[facility]  Gynecology  Date of Service: 2023    CC: Mastitis    HPI  Lissa Vargas is a 32 y.o.  premenopausal female who presents with complaints of mastitis.      She states that earlier this week she began to have fevers.  She went to an Urgent Care and was negative for the flu.  She then called on Tuesday stating that her breast looked red; she was told that she would have ABX called in.  She called yesterday and dicloxacillin was called in.  She was not able to pick it up until yesterday afternoon because of the pharmacy not initially having it.  She states that she has gotten less and less milk out; she states that last night she got 1 oz out and this morning she has only gotten 1 drop out.  She states that the redness has worsened on the right breast.  Her highest fever was 104; her most recent fever was 102 overnight.    Upon arrival, she was noticeably upset and tearful.  She states that she has had a lot of anxiety but this week has been pretty terrible for her.  Denies SI/HI.    ROS  Review of Systems   Constitutional: Positive for chills and fever.   HENT: Negative.    Eyes: Negative.    Respiratory: Negative.    Cardiovascular: Negative.    Gastrointestinal: Negative.    Endocrine: Negative.    Genitourinary: Negative.    Musculoskeletal: Negative.    Skin: Negative.    Allergic/Immunologic: Negative.    Neurological: Negative.    Hematological: Negative.    Psychiatric/Behavioral: The patient is nervous/anxious.      GYN HISTORY  Menarche: age 12  History of STIs: None  Last pap smear: 2019  Abnormal pap smear history: None  Contraception: POPs     OB HISTORY  OB History    Para Term  AB Living   2 2 2 0 0 2   SAB IAB Ectopic Molar Multiple Live Births   0 0 0 0 0 2      # Outcome Date GA Lbr Malachi/2nd Weight Sex Delivery Anes PTL Lv   2 Term 22 39w6d 06:58 / 00:13 3610 g (7 lb 15.3 oz) M Vag-Spont EPI N MARV      Complications: Shoulder  "Dystocia   1 Term 10/09/20 40w3d 14:03 / 00:08 2830 g (6 lb 3.8 oz) M Vag-Spont EPI N MARV     PAST MEDICAL HISTORY  Past Medical History:   Diagnosis Date   • Anxiety    • Depression    • Hip pain      PAST SURGICAL HISTORY  Past Surgical History:   Procedure Laterality Date   • EAR TUBES      X 3 as a child   • WISDOM TOOTH EXTRACTION       FAMILY HISTORY  Family History   Problem Relation Age of Onset   • Cancer Mother    • Hypertension Father    • Cancer Father    • Stroke Paternal Grandfather      SOCIAL HISTORY  Social History     Socioeconomic History   • Marital status:    Tobacco Use   • Smoking status: Never   • Smokeless tobacco: Never   Substance and Sexual Activity   • Alcohol use: Not Currently     Comment: Rarely   • Drug use: No   • Sexual activity: Yes     Partners: Male     Birth control/protection: Pill     ALLERGIES  Allergies   Allergen Reactions   • Ibuprofen Swelling     Facial swelling     HOME MEDICATIONS  Prior to Admission medications    Medication Sig Start Date End Date Taking? Authorizing Provider   dicloxacillin (DYNAPEN) 500 MG capsule Take 1 capsule by mouth 4 (Four) Times a Day. 1/12/23   Carola Gipson DO   norethindrone (MICRONOR) 0.35 MG tablet Take 1 tablet by mouth Daily. 10/18/22 10/18/23  Vida Post MD   Prenatal Vit-Fe Fumarate-FA (PRENATAL, CLASSIC, VITAMIN) 28-0.8 MG tablet tablet Take  by mouth Daily.    Provider, MD CHARITY Tony  /80 (BP Location: Left arm, Patient Position: Sitting, Cuff Size: Adult)   Ht 157.5 cm (62\")   Wt 72.1 kg (159 lb)   Breastfeeding Yes   BMI 29.08 kg/m²        General: Alert, healthy, no distress, well nourished and well developed.  Neurologic: Alert, oriented to person, place, and time.  Gait normal.  Cranial nerves II-XII grossly intact.  HEENT: Normocephalic, atraumatic.  Extraocular muscles intact, pupils equal and reactive times two.    Neck: Supple, no adenopathy, thyroid normal size, non-tender, " without nodularity, trachea midline.  Breasts: Left breast normal; no masses, skin dimpling, skin retraction, nipple discharge, or asymmetry bilaterally.  Right breast erythematous from nipple to mid-breast, edematous, firm, tender to palpation.  Lungs: Normal respiratory effort.  Clear to auscultation bilaterally.  No wheezes, rhonci, or rales.  Heart: Regular rate and rhythm.  No murmer, rub or gallop.  Abdomen: Soft, non-tender, non-distended,no masses, no hepatosplenomegaly, no hernia.  Skin: No rash, no lesions.  Extremities: No cyanosis, clubbing or edema.    EPDS 7    Prelim US- No well-defined abnormality seen in the breast.  2 lymph nodes in the axilla, largest 1.87 cm.    IMPRESSION  Lissa Vargas is a 32 y.o.  presenting with acute mastitis, within 24h of initiation of ABX therapy.    PLAN    1. Acute mastitis  US obtained to r/o abscess; none seen.  Continue dicloxacillin course.  If no improvement by Monday or Tuesday, patient instructed to call as ABX therapy may need to be changed.  Encouraged warm compresses, massage of the breast, breastpumping/feeding despite it being uncomfortable.  Patient voices understanding.  - US Breast Right Limited; Future  - ondansetron (Zofran) 4 MG tablet; Take 1 tablet by mouth Every 8 (Eight) Hours As Needed for Nausea or Vomiting.  Dispense: 30 tablet; Refill: 1    2. Anxiety  Discussed PP anxiety.  She states that prior to being pregnant with her 1st child, she was on medications (on chart review, Wellbutrin XL 150mg daily, Zoloft 25mg daily).  She would like to restart a medication.  Offered counseling, she will let us know.  She is scheduled to see Anya on  for her annual/pap so will have her follow-up and see if she needs additional medication as Anya had initially prescribed those medications back in 2018.  - buPROPion XL (Wellbutrin XL) 150 MG 24 hr tablet; Take 1 tablet by mouth Every Morning.  Dispense: 30 tablet; Refill: 6        This  document has been electronically signed by Vida Post MD on January 13, 2023 16:10 CST.

## 2023-01-16 DIAGNOSIS — N61.0 ACUTE MASTITIS: Primary | ICD-10-CM

## 2023-01-16 RX ORDER — CEPHALEXIN 500 MG/1
500 CAPSULE ORAL 4 TIMES DAILY
Qty: 40 CAPSULE | Refills: 0 | Status: SHIPPED | OUTPATIENT
Start: 2023-01-16 | End: 2023-01-26

## 2023-01-18 DIAGNOSIS — N61.0 ACUTE MASTITIS: Primary | ICD-10-CM

## 2023-01-19 ENCOUNTER — TELEPHONE (OUTPATIENT)
Dept: OBSTETRICS AND GYNECOLOGY | Facility: CLINIC | Age: 33
End: 2023-01-19

## 2023-01-19 NOTE — TELEPHONE ENCOUNTER
PATIENT CALLED AND SAID SHE MISSED A CALL IN REGARDS TO SURGERY WITH DR FERGUSON? HER NUMBER [phone].          THANKS,        MARTY

## 2023-01-20 ENCOUNTER — OFFICE VISIT (OUTPATIENT)
Dept: SURGERY | Facility: CLINIC | Age: 33
End: 2023-01-20
Payer: COMMERCIAL

## 2023-01-20 VITALS
DIASTOLIC BLOOD PRESSURE: 68 MMHG | HEIGHT: 63 IN | WEIGHT: 159 LBS | BODY MASS INDEX: 28.17 KG/M2 | SYSTOLIC BLOOD PRESSURE: 108 MMHG | HEART RATE: 82 BPM | TEMPERATURE: 96.9 F

## 2023-01-20 DIAGNOSIS — N61.0 MASTITIS: Primary | ICD-10-CM

## 2023-01-20 PROCEDURE — 99203 OFFICE O/P NEW LOW 30 MIN: CPT | Performed by: SURGERY

## 2023-01-20 NOTE — PROGRESS NOTES
Subjective   Lissa Vargas is a 32 y.o. female     Chief Complaint: Mastitis possible abscess right breast    History of Present Illness referred from OB with concern patient may have an abscess of the right breast.  Patient is currently breast-feeding and has been for the past 4 months.  Last week she had some flulike symptoms and was seen and she noticed a red spot on her right breast above the nipple.  She was seen and started on oral antibiotics and currently she says she has had no fever and overall she feels much better.  Somewhat confusing but she has had multiple breast ultrasounds over the last few days the one that I have able to look at was done yesterday and there is some question that she had 1 done in [place] earlier today.  Ultrasound from yesterday does not show any obvious abscess.  There may be a very superficial fluid collection that may be 3 mm in thickness.  Review of Systems   Constitutional: Negative.    HENT: Negative.    Eyes: Negative.    Respiratory: Negative.    Cardiovascular: Negative.    Gastrointestinal: Negative.    Endocrine: Negative.    Genitourinary: Negative.    Musculoskeletal: Negative.    Skin: Negative.    Allergic/Immunologic: Negative.    Neurological: Negative.    Hematological: Negative.    Psychiatric/Behavioral: Negative.      Past Medical History:   Diagnosis Date   • Anxiety    • Depression    • Hip pain      Past Surgical History:   Procedure Laterality Date   • EAR TUBES      X 3 as a child   • WISDOM TOOTH EXTRACTION       Family History   Problem Relation Age of Onset   • Cancer Mother    • Hypertension Father    • Cancer Father    • Stroke Paternal Grandfather      Social History     Socioeconomic History   • Marital status:    Tobacco Use   • Smoking status: Never   • Smokeless tobacco: Never   Vaping Use   • Vaping Use: Never used   Substance and Sexual Activity   • Alcohol use: Not Currently     Comment: Rarely   • Drug use: No   • Sexual  activity: Yes     Partners: Male     Birth control/protection: Pill     Allergies   Allergen Reactions   • Ibuprofen Swelling     Facial swelling     Vitals:    01/20/23 1305   BP: 108/68   Pulse: 82   Temp: 96.9 °F (36.1 °C)       Home Medications:  Prior to Admission medications    Medication Sig Start Date End Date Taking? Authorizing Provider   buPROPion XL (Wellbutrin XL) 150 MG 24 hr tablet Take 1 tablet by mouth Every Morning. 1/13/23 1/13/24 Yes Vida Post MD   cephalexin (Keflex) 500 MG capsule Take 1 capsule by mouth 4 (Four) Times a Day for 10 days. 1/16/23 1/26/23 Yes Vida Post MD   dicloxacillin (DYNAPEN) 500 MG capsule Take 1 capsule by mouth 4 (Four) Times a Day. 1/12/23  Yes Carola Gipson DO   norethindrone (MICRONOR) 0.35 MG tablet Take 1 tablet by mouth Daily. 10/18/22 10/18/23 Yes Vida Post MD   ondansetron (Zofran) 4 MG tablet Take 1 tablet by mouth Every 8 (Eight) Hours As Needed for Nausea or Vomiting. 1/13/23 1/13/24 Yes Vida Post MD   Prenatal Vit-Fe Fumarate-FA (PRENATAL, CLASSIC, VITAMIN) 28-0.8 MG tablet tablet Take  by mouth Daily.   Yes Provider, MD Cecily       Objective   Physical Exam nontoxic-appearing  Slight erythematous area above the right nipple areolar complex.  No fluctuance no obvious cellulitis no mass appreciated    Assessment & Plan May very well have mild mastitis.  Agree with antibiotics.  She does not have anything that should be aspirated at this point.  If she were to develop an abscess I would recommend aspiration at least once maybe 2 or 3 times prior to considering any type of surgery.  Strongly recommend that she keep her breast from getting engorged if possible.  Baby can feed from that breast.  She can pump at breast as well but should keep it from becoming engorged if at all possible patient will follow up with us next week if she is not improving or worse      The encounter  diagnosis was Mastitis.                     This document has been electronically signed by Obi Figueroa MD on January 20, 2023 15:02 CST

## 2023-01-20 NOTE — LETTER
January 25, 2023     Patient: Lissa Vargas   YOB: 1990   Date of Visit: 1/20/2023       To Whom It May Concern:        Mrs. Vargas was seen by me on Friday 1/20/23. Please excuse her from work.           Sincerely,            This document has been electronically signed by Obi Figueroa MD on January 25, 2023 09:59 CST      Obi Figueroa MD    CC: No Recipients

## 2023-01-29 ENCOUNTER — PATIENT MESSAGE (OUTPATIENT)
Dept: OBSTETRICS AND GYNECOLOGY | Facility: CLINIC | Age: 33
End: 2023-01-29
Payer: COMMERCIAL

## 2023-01-31 NOTE — TELEPHONE ENCOUNTER
From: Lissa Vargas  To: Anya Camarena  Sent: 1/29/2023 6:38 PM CST  Subject: Question    Raj Joyner. I am not sure who I need to see for this but I am having some really bad pain with maybe a hemorrhoid or something. Is that something I would see you or Vida for? I have never had this issue and it’s extremely painful. Not sure what to do or who I need to go to for it.

## 2023-01-31 NOTE — TELEPHONE ENCOUNTER
I called pt today. She has having rectal spasms, blood in the toilet and severe rectal pain with bowel movements. She saw PCP yesterday who did not see external hemorrhoids. Fissure or internal hemorrhoids suspected. She started stool softeners a couple of days ago and I encouraged dermaplast and hydration and preparation H. Pt will monitor for 2 days and if still occurring, I will set her up with EBONI Arellano on Friday. Pt agrees with this plan.

## 2023-02-01 RX ORDER — HYDROCORTISONE ACETATE 25 MG/1
25 SUPPOSITORY RECTAL 2 TIMES DAILY
Qty: 24 EACH | Refills: 0 | Status: SHIPPED | OUTPATIENT
Start: 2023-02-01 | End: 2023-02-24

## 2023-02-01 RX ORDER — LIDOCAINE HYDROCHLORIDE 20 MG/ML
5 SOLUTION OROPHARYNGEAL AS NEEDED
Qty: 100 ML | Refills: 0 | Status: SHIPPED | OUTPATIENT
Start: 2023-02-01 | End: 2023-02-07 | Stop reason: ALTCHOICE

## 2023-02-01 NOTE — PROGRESS NOTES
Pt has severe rectal pain with bowel movements, profuse rectal bleeding with bowel movements. She saw PCP this week who examined and saw now external hemorrhoids. She was instructed to do OTC hemorrhoid creams, stool softener, fiber, hydration. Unchanged in 3 days. I suspect fissure. Will initiate annusol HC and lidocaine. If not improved on Friday, EBONI Arellano with GI will see her Monday.

## 2023-02-07 ENCOUNTER — LAB (OUTPATIENT)
Dept: LAB | Facility: OTHER | Age: 33
End: 2023-02-07
Payer: COMMERCIAL

## 2023-02-07 ENCOUNTER — OFFICE VISIT (OUTPATIENT)
Dept: OBSTETRICS AND GYNECOLOGY | Facility: CLINIC | Age: 33
End: 2023-02-07
Payer: COMMERCIAL

## 2023-02-07 VITALS
DIASTOLIC BLOOD PRESSURE: 72 MMHG | SYSTOLIC BLOOD PRESSURE: 102 MMHG | HEART RATE: 91 BPM | HEIGHT: 63 IN | BODY MASS INDEX: 27.71 KG/M2 | WEIGHT: 156.4 LBS

## 2023-02-07 DIAGNOSIS — F41.9 ANXIETY: ICD-10-CM

## 2023-02-07 DIAGNOSIS — Z87.19 HISTORY OF ANAL FISSURES: ICD-10-CM

## 2023-02-07 DIAGNOSIS — Z01.419 ENCOUNTER FOR GYNECOLOGICAL EXAMINATION WITH PAPANICOLAOU SMEAR OF CERVIX: Primary | ICD-10-CM

## 2023-02-07 PROCEDURE — 99213 OFFICE O/P EST LOW 20 MIN: CPT | Performed by: NURSE PRACTITIONER

## 2023-02-07 PROCEDURE — 99395 PREV VISIT EST AGE 18-39: CPT | Performed by: NURSE PRACTITIONER

## 2023-02-07 RX ORDER — BUSPIRONE HYDROCHLORIDE 5 MG/1
5 TABLET ORAL 3 TIMES DAILY
Qty: 90 TABLET | Refills: 1 | Status: SHIPPED | OUTPATIENT
Start: 2023-02-07

## 2023-02-07 RX ORDER — FLUCONAZOLE 150 MG/1
150 TABLET ORAL ONCE
Qty: 2 TABLET | Refills: 0 | Status: SHIPPED | OUTPATIENT
Start: 2023-02-07 | End: 2023-02-07

## 2023-02-07 RX ORDER — LIDOCAINE HYDROCHLORIDE 20 MG/ML
JELLY TOPICAL
COMMUNITY
Start: 2023-02-01

## 2023-02-08 NOTE — PROGRESS NOTES
Subjective   Chief Complaint   Patient presents with   • Gynecologic Exam     WWNIA Vargas is a 32 y.o. year old  presenting to be seen for her annual exam.  Today she has concerns about taking wellbutrin. She stopped taking it after not seeing improvement and noticing worsening anxiety. She took zoloft in the past but had low libido concerns. She has never tried buspar. Would like to try this.     She had , boy Arnoldo, 5 months ago. Is exclusively breastfeeding. On Micronor. Has her first period since delivery last week. Is taking her OCP daily as prescribed and voices understanding of the need to take it at the same time every day and when to use back up protection.     Recently has mastitis. Was given dicloxacillin 3.5 weeks ago. Completed the full course and saw general surgery. Since no abscess was noted, no I&D was necessary. She has had improvement but continues to have some pain and redness in the inner quadrants of the right breast and enlarged lymph nodes in the axilla.     Last week dealt with rectal bleeding and pain. We discussed over the phone and mychart and suspected fissure occurred. This has now resolved with use of lidocaine and anusol HC. She will continue to soften stool with colace and use miralax PRN. Increase fiber and hydration to prevent hard stools. If recurring, we will refer to GI. She has not concerns with it today    Patient's last menstrual period was 2023 (approximate).    OB History        2    Para   2    Term   2       0    AB   0    Living   2       SAB   0    IAB   0    Ectopic   0    Molar   0    Multiple   0    Live Births   2                Current birth control method: OCP (estrogen/progesterone). Needs refills.     She is sexually active.  In the past 12 months there has not been new sexual partners.  Condoms are not typically used.  She would not like to be screened for STD's at today's exam.     Past 6 month menstrual  "history:    Cycle Frequency: irregular  infrequent   Menstrual cycle character: flow is typically light   Cycle Duration: 5   Number of heavy days of flows: 0   Dysmenorrhea: none   PMS: none   Intermenstrual bleeding present: no   Post-coital bleeding present: no     She exercises regularly: yes.  She wears her seat belt:yes.  She has concerns about domestic violence: no.  Last colonoscopy: Never  Last DEXA: Never  Last MMG: Never  Last pap: 12/6/19  History of abnormal PAP: No    The following portions of the patient's history were reviewed and updated as appropriate:problem list, current medications, allergies, past family history, past medical history, past social history and past surgical history.    Social History    Tobacco Use      Smoking status: Never      Smokeless tobacco: Never    Social History     Substance and Sexual Activity   Alcohol Use Not Currently    Comment: Rarely      Review of Systems   Constitutional: Negative.  Negative for chills and fever.   Respiratory: Negative.    Cardiovascular: Negative.    Gastrointestinal: Positive for anal bleeding, constipation and rectal pain. Negative for abdominal distention, abdominal pain, blood in stool, diarrhea, nausea and vomiting.        Now all resolved, see HPI   Endocrine: Negative.  Negative for cold intolerance, heat intolerance, polydipsia, polyphagia and polyuria.   Genitourinary: Negative for dyspareunia, dysuria, frequency, genital sores, menstrual problem, pelvic pain, urgency, vaginal discharge and vaginal pain.        Right breast tender, mildly red, axillary lymph nodes palpable.    Skin: Negative.    Neurological: Negative for dizziness, syncope, light-headedness and headaches.   Psychiatric/Behavioral: Negative for suicidal ideas. The patient is nervous/anxious.         Denies depression, just having racing thoughts and anxiousness         Objective   /72   Pulse 91   Ht 160 cm (63\")   Wt 70.9 kg (156 lb 6.4 oz)   LMP " 01/30/2023 (Approximate) Comment: 1st period since before last pregnancy  Breastfeeding Yes   BMI 27.71 kg/m²     General:  well developed; well nourished  no acute distress   Skin:  No suspicious lesions seen   Thyroid: not examined   Breasts:  Examined in supine position  Symmetric without masses or skin dimpling  Nipples normal without inversion, lesions or discharge  There are no palpable axillary nodes  Fibrocystic changes are present both breasts without a discrete mass   Right breast has some mild erythema to the inner quadrants, very mild, generalized induration in the area but no palpable mass or abscess. Right axillary lymph node is palpable and TTP.    Abdomen: soft, non-tender; no masses  no umbilical or inginual hernias are present  no hepato-splenomegaly  Normal findings: bowel sounds normal   Cardiac: Heart sounds are normal.  Regular rate and rhythm without murmur, gallop or rub.   Resp: Normal expansion.  Clear to auscultation.  No rales, rhonchi, or wheezing.   Psych: alert,oriented, in NAD with a full range of affect, normal behavior and no psychotic features. Mild anxiety.   Pelvis: Uterus:  Clinical staff was present for exam  External genitalia:  normal appearance of the external genitalia including Bartholin's and Kearns's glands.   :  urethral meatus normal;  Vaginal:  normal pink mucosa without prolapse or lesions  Cervix:  normal appearance.  Uterus:  normal size, shape and consistency  Adnexa:  normal bimanual exam of the adnexa.  Rectal: healing fissure at the 7:00 position, no external hemorrhoids.       Lab Review   No data reviewed    Imaging  No data reviewed       Diagnoses and all orders for this visit:    1. Encounter for gynecological examination with Papanicolaou smear of cervix (Primary)  -     LIQUID-BASED PAP SMEAR, [facility] ([facility])    2. Mastitis associated with childbirth, delivered  -     dicloxacillin (DYNAPEN) 500 MG capsule; Take 1 capsule by mouth 4 (Four)  Times a Day.  Dispense: 28 capsule; Refill: 0  -     fluconazole (DIFLUCAN) 150 MG tablet; Take 1 tablet by mouth 1 (One) Time for 1 dose. Repeat dose in 72 hours if still symptomatic  Dispense: 2 tablet; Refill: 0    3. Anxiety  -     busPIRone (BUSPAR) 5 MG tablet; Take 1 tablet by mouth 3 (Three) Times a Day.  Dispense: 90 tablet; Refill: 1    4. History of anal fissures    Pap results: I will send card in mail or call if abnormal. RTC annually for well woman exams.     SBE encouraged monthly. Discussed recent mastitis and continued symptoms. Given appearance of potential recurrence, I do recommend we treat with another round of antibiotics. Pt voices understanding and does not want it to progress. Encouraged to monitor the breast closely and RTC if it progresses. I will send diflucan to prevent a secondary candida infection as well.     Continue POP faithfully. Reviewed efficacy and when to use back up. Will let me know when she weans and we can switch to TOSHA. Reassured pt of irregular bleeding pattern as well.      Discussed anxiety concerns. Wellbutrin likely worsened this since depression is not of concern. I recommend buspar 5mg TID. Begin with once daily and increase to TID as tolerated and needed. Pt agrees with this plan of care. She will let me know in 1 month if dose adjustment is needed or if it is not efficacious. Denies SI/HI.     Discussed improvement in anal fissure and encouraged continued measures to soften stool and prevent recurrence. If it occurs again, we will refer to EBONI Arellano with GI. Pt agrees with this plan of care.     This note was electronically signed.    Anya Camarena, APRN  February 8, 2023

## 2023-02-09 LAB — REF LAB TEST METHOD: NORMAL

## 2023-02-24 ENCOUNTER — OFFICE VISIT (OUTPATIENT)
Dept: GASTROENTEROLOGY | Facility: CLINIC | Age: 33
End: 2023-02-24
Payer: COMMERCIAL

## 2023-02-24 VITALS
WEIGHT: 152.7 LBS | HEART RATE: 93 BPM | HEIGHT: 63 IN | DIASTOLIC BLOOD PRESSURE: 74 MMHG | BODY MASS INDEX: 27.05 KG/M2 | SYSTOLIC BLOOD PRESSURE: 118 MMHG

## 2023-02-24 DIAGNOSIS — K59.00 CONSTIPATION, UNSPECIFIED CONSTIPATION TYPE: Primary | ICD-10-CM

## 2023-02-24 DIAGNOSIS — K60.2 ANAL FISSURE: ICD-10-CM

## 2023-02-24 DIAGNOSIS — K62.89 ANAL OR RECTAL PAIN: ICD-10-CM

## 2023-02-24 DIAGNOSIS — K62.5 RECTAL BLEEDING: ICD-10-CM

## 2023-02-24 PROCEDURE — 99204 OFFICE O/P NEW MOD 45 MIN: CPT | Performed by: PHYSICIAN ASSISTANT

## 2023-02-24 RX ORDER — SODIUM CHLORIDE 9 MG/ML
40 INJECTION, SOLUTION INTRAVENOUS AS NEEDED
Status: CANCELLED | OUTPATIENT
Start: 2023-02-28

## 2023-02-24 RX ORDER — DOCUSATE SODIUM 100 MG/1
100 CAPSULE, LIQUID FILLED ORAL 3 TIMES DAILY
COMMUNITY

## 2023-02-24 RX ORDER — PRAMOXINE HYDROCHLORIDE HYDROCORTISONE ACETATE 100; 100 MG/10G; MG/10G
1 AEROSOL, FOAM TOPICAL 2 TIMES DAILY
Qty: 20 G | Refills: 1 | Status: SHIPPED | OUTPATIENT
Start: 2023-02-24

## 2023-02-24 RX ORDER — SODIUM, POTASSIUM,MAG SULFATES 17.5-3.13G
1 SOLUTION, RECONSTITUTED, ORAL ORAL EVERY 12 HOURS
Qty: 354 ML | Refills: 0 | Status: SHIPPED | OUTPATIENT
Start: 2023-02-24 | End: 2023-03-09

## 2023-02-24 RX ORDER — DEXTROSE AND SODIUM CHLORIDE 5; .45 G/100ML; G/100ML
30 INJECTION, SOLUTION INTRAVENOUS CONTINUOUS PRN
Status: CANCELLED | OUTPATIENT
Start: 2023-02-28

## 2023-02-24 NOTE — PROGRESS NOTES
[facility]      Chief Complaint:   Chief Complaint   Patient presents with   • Constipation     Said she has had some rectal bleeding        Subjective     HPI:   Patient presents for evaluation due to anorectal pain, rectal bleeding and constipation.  Patient states she first noted the symptoms approximately 2 months ago.  Patient states she first noted anal pain that was followed by episodes of rectal bleeding.  Patient thinks the symptoms may have been preceded by a bout of constipation.  Patient states that she can go several days without a bowel movement but when she does have a bowel movement she indicates stool as [place] type IV.  Patient states when she sees bleeding, it is a red streak along her stool or mixed into the water.     Patient states she saw her PCP for this and was instructed to do sitz bath, increase fiber and to increase her water intake to help with constipation.  Patient states her gynecology provider told her that she had an anal fissure.  She was prescribed Anusol HC suppositories and lidocaine jelly and reports her symptoms went away for short period of time.  She discontinued the medication and now her symptoms have returned.    Patient states her father was diagnosed with stage III colon cancer at the age of 53.  Patient denies abdominal pain, heartburn, nausea/vomiting and dysphagia.  Patient states she has been undergoing treatment for mastitis for the last 2 months without relief of symptoms.  Patient states she had a baby 5 months ago but states none of these problems occurred around that time.    Assessment/plan:  1.  Constipation- patient will continue fiber supplementation and stool softeners as per current.  Patient will be scheduled for colonoscopy for further evaluation.  2.  Anal pain/fissure- patient has been using Xylocaine 2% jelly without relief of symptoms.  I will have her compounding pharmacy compound 0.3% nifedipine topical  with 2% Xylocaine jelly to be applied twice daily.  She can continue to use Anusol HC suppositories or Proctofoam.  3.  Rectal bleeding- colonoscopy for further evaluation.    Follow-up will be planned after colonoscopy has been completed for results and recommendations based upon findings.    Past Medical History:   Past Medical History:   Diagnosis Date   • Anxiety    • Depression    • Hip pain    • Mastitis        Past Surgical History:    Past Surgical History:   Procedure Laterality Date   • EAR TUBES      X 3 as a child   • WISDOM TOOTH EXTRACTION         Family History:  Family History   Problem Relation Age of Onset   • Colon cancer Father    • Hypertension Father    • Cancer Father    • Cancer Mother         non hodgkins lymphoma   • Stroke Paternal Grandfather        Social History:   reports that she has never smoked. She has never used smokeless tobacco. She reports that she does not currently use alcohol. She reports that she does not use drugs.    Medications:   Prior to Admission medications    Medication Sig Start Date End Date Taking? Authorizing Provider   busPIRone (BUSPAR) 5 MG tablet Take 1 tablet by mouth 3 (Three) Times a Day. 2/7/23  Yes Anya Camarena APRN   Lidocaine HCl gel (XYLOCAINE) 2 % gel  2/1/23  Yes ProviderCecily MD   norethindrone (MICRONOR) 0.35 MG tablet Take 1 tablet by mouth Daily. 10/18/22 10/18/23 Yes Vida Post MD   Prenatal Vit-Fe Fumarate-FA (PRENATAL, CLASSIC, VITAMIN) 28-0.8 MG tablet tablet Take  by mouth Daily.   Yes Provider, MD Cecily   hydrocortisone (ANUSOL-HC) 25 MG suppository Insert 1 suppository into the rectum 2 (Two) Times a Day. 2/1/23 2/24/23 Yes Anya Camarena APRN   Hydrocort-Pramoxine, Perianal, (Proctofoam HC) 1-1 % rectal foam Insert 1 application into the rectum 2 (Two) Times a Day. 2/24/23   Sheryl Arellano PA-C   sodium-potassium-magnesium sulfates (Suprep Bowel Prep Kit) 17.5-3.13-1.6  "GM/177ML solution oral solution Take 1 bottle by mouth Every 12 (Twelve) Hours. 2/24/23   Sheryl Arellano PA-C   dicloxacillin (DYNAPEN) 500 MG capsule Take 1 capsule by mouth 4 (Four) Times a Day. 2/7/23 2/24/23  Anya Camarena APRN   ondansetron (Zofran) 4 MG tablet Take 1 tablet by mouth Every 8 (Eight) Hours As Needed for Nausea or Vomiting. 1/13/23 2/24/23  Vida Post MD       Allergies:  Ibuprofen    ROS:    Review of Systems   Constitutional: Negative.  Negative for fever and unexpected weight change.   HENT: Negative.    Eyes: Negative.    Respiratory: Negative.  Negative for shortness of breath.    Cardiovascular: Negative.  Negative for chest pain.   Gastrointestinal: Positive for anal bleeding, constipation and rectal pain. Negative for abdominal distention, abdominal pain, blood in stool, diarrhea, nausea and vomiting.   Endocrine: Negative.    Genitourinary: Negative.    Neurological: Negative.    Hematological: Negative.    Psychiatric/Behavioral: Negative.      Objective     Blood pressure 118/74, pulse 93, height 160 cm (63\"), weight 69.3 kg (152 lb 11.2 oz), last menstrual period 01/30/2023, currently breastfeeding.    Physical Exam  Vitals and nursing note reviewed. Exam conducted with a chaperone present.   Constitutional:       Appearance: Normal appearance.   HENT:      Head: Normocephalic and atraumatic.   Cardiovascular:      Rate and Rhythm: Normal rate and regular rhythm.   Pulmonary:      Effort: Pulmonary effort is normal.      Breath sounds: Normal breath sounds.   Abdominal:      General: Bowel sounds are normal.      Palpations: Abdomen is soft.   Genitourinary:     Rectum: Tenderness, anal fissure and external hemorrhoid present.       Skin:     General: Skin is warm.      Coloration: Skin is not jaundiced.   Neurological:      Mental Status: She is alert.          Assessment & Plan   Diagnoses and all orders for this visit:    1. Constipation, " unspecified constipation type (Primary)  -     Case Request; Standing  -     sodium chloride 0.9 % infusion 40 mL  -     dextrose 5 % and sodium chloride 0.45 % infusion  -     Case Request    2. Anal or rectal pain  -     Case Request; Standing  -     sodium chloride 0.9 % infusion 40 mL  -     dextrose 5 % and sodium chloride 0.45 % infusion  -     Case Request    3. Rectal bleeding  -     Case Request; Standing  -     sodium chloride 0.9 % infusion 40 mL  -     dextrose 5 % and sodium chloride 0.45 % infusion  -     Case Request    4. Anal fissure    Other orders  -     Follow Anesthesia Guidelines / Protocol; Future  -     Obtain Informed Consent; Future  -     Implement Anesthesia Orders Day of Procedure; Standing  -     Obtain Informed Consent; Standing  -     POC Glucose Once; Standing  -     Insert Peripheral IV; Standing  -     Hydrocort-Pramoxine, Perianal, (Proctofoam HC) 1-1 % rectal foam; Insert 1 application into the rectum 2 (Two) Times a Day.  Dispense: 20 g; Refill: 1  -     sodium-potassium-magnesium sulfates (Suprep Bowel Prep Kit) 17.5-3.13-1.6 GM/177ML solution oral solution; Take 1 bottle by mouth Every 12 (Twelve) Hours.  Dispense: 354 mL; Refill: 0        COLONOSCOPY (N/A)     Diagnosis Plan   1. Constipation, unspecified constipation type  Case Request    sodium chloride 0.9 % infusion 40 mL    dextrose 5 % and sodium chloride 0.45 % infusion    Case Request      2. Anal or rectal pain  Case Request    sodium chloride 0.9 % infusion 40 mL    dextrose 5 % and sodium chloride 0.45 % infusion    Case Request      3. Rectal bleeding  Case Request    sodium chloride 0.9 % infusion 40 mL    dextrose 5 % and sodium chloride 0.45 % infusion    Case Request      4. Anal fissure            Anticipated Surgical Procedure:  Orders Placed This Encounter   Procedures   • Obtain Informed Consent     Standing Status:   Future     Order Specific Question:   Informed Consent Given For     Answer:    colonoscopy       The risks, benefits, and alternatives of this procedure have been discussed with the patient or the responsible party- the patient understands and agrees to proceed.

## 2023-02-28 ENCOUNTER — HOSPITAL ENCOUNTER (OUTPATIENT)
Facility: HOSPITAL | Age: 33
Setting detail: HOSPITAL OUTPATIENT SURGERY
Discharge: HOME | End: 2023-02-28
Attending: INTERNAL MEDICINE | Admitting: INTERNAL MEDICINE
Payer: COMMERCIAL

## 2023-02-28 ENCOUNTER — ANESTHESIA EVENT (OUTPATIENT)
Dept: GASTROENTEROLOGY | Facility: HOSPITAL | Age: 33
End: 2023-02-28
Payer: COMMERCIAL

## 2023-02-28 ENCOUNTER — ANESTHESIA (OUTPATIENT)
Dept: GASTROENTEROLOGY | Facility: HOSPITAL | Age: 33
End: 2023-02-28
Payer: COMMERCIAL

## 2023-02-28 VITALS
BODY MASS INDEX: 26.17 KG/M2 | OXYGEN SATURATION: 98 % | WEIGHT: 147.71 LBS | HEIGHT: 63 IN | RESPIRATION RATE: 16 BRPM | HEART RATE: 89 BPM | TEMPERATURE: 97.8 F | SYSTOLIC BLOOD PRESSURE: 97 MMHG | DIASTOLIC BLOOD PRESSURE: 50 MMHG

## 2023-02-28 DIAGNOSIS — K62.5 RECTAL BLEEDING: ICD-10-CM

## 2023-02-28 DIAGNOSIS — K59.00 CONSTIPATION, UNSPECIFIED CONSTIPATION TYPE: ICD-10-CM

## 2023-02-28 DIAGNOSIS — K62.89 ANAL OR RECTAL PAIN: ICD-10-CM

## 2023-02-28 PROCEDURE — 45378 DIAGNOSTIC COLONOSCOPY: CPT | Performed by: INTERNAL MEDICINE

## 2023-02-28 PROCEDURE — 25010000002 ONDANSETRON PER 1 MG

## 2023-02-28 PROCEDURE — 25010000002 PROPOFOL 10 MG/ML EMULSION

## 2023-02-28 RX ORDER — DEXTROSE AND SODIUM CHLORIDE 5; .45 G/100ML; G/100ML
30 INJECTION, SOLUTION INTRAVENOUS CONTINUOUS PRN
Status: DISCONTINUED | OUTPATIENT
Start: 2023-02-28 | End: 2023-02-28 | Stop reason: HOSPADM

## 2023-02-28 RX ORDER — PROPOFOL 10 MG/ML
VIAL (ML) INTRAVENOUS AS NEEDED
Status: DISCONTINUED | OUTPATIENT
Start: 2023-02-28 | End: 2023-02-28 | Stop reason: SURG

## 2023-02-28 RX ORDER — ONDANSETRON 2 MG/ML
INJECTION INTRAMUSCULAR; INTRAVENOUS AS NEEDED
Status: DISCONTINUED | OUTPATIENT
Start: 2023-02-28 | End: 2023-02-28 | Stop reason: SURG

## 2023-02-28 RX ORDER — SODIUM CHLORIDE 9 MG/ML
40 INJECTION, SOLUTION INTRAVENOUS AS NEEDED
Status: DISCONTINUED | OUTPATIENT
Start: 2023-02-28 | End: 2023-02-28 | Stop reason: HOSPADM

## 2023-02-28 RX ORDER — LIDOCAINE HYDROCHLORIDE 20 MG/ML
INJECTION, SOLUTION INTRAVENOUS AS NEEDED
Status: DISCONTINUED | OUTPATIENT
Start: 2023-02-28 | End: 2023-02-28 | Stop reason: SURG

## 2023-02-28 RX ADMIN — DEXTROSE AND SODIUM CHLORIDE 30 ML/HR: 5; 450 INJECTION, SOLUTION INTRAVENOUS at 12:43

## 2023-02-28 RX ADMIN — PROPOFOL 120 MG: 10 INJECTION, EMULSION INTRAVENOUS at 13:07

## 2023-02-28 RX ADMIN — LIDOCAINE HYDROCHLORIDE 100 MG: 20 INJECTION, SOLUTION INTRAVENOUS at 13:04

## 2023-02-28 RX ADMIN — ONDANSETRON 4 MG: 2 INJECTION INTRAMUSCULAR; INTRAVENOUS at 13:20

## 2023-02-28 RX ADMIN — PROPOFOL 300 MG: 10 INJECTION, EMULSION INTRAVENOUS at 13:08

## 2023-02-28 NOTE — ANESTHESIA POSTPROCEDURE EVALUATION
Patient: Lissa Vargas    Procedure Summary     Date: 02/28/23 Room / Location: [facility] ENDOSCOPY 2 / [facility] ENDOSCOPY    Anesthesia Start: 1300 Anesthesia Stop: 1321    Procedure: COLONOSCOPY Diagnosis:       Constipation, unspecified constipation type      Anal or rectal pain      Rectal bleeding      (Constipation, unspecified constipation type [K59.00])      (Anal or rectal pain [K62.89])      (Rectal bleeding [K62.5])    Surgeons: Brett Moseley MD Provider: Queenie Dia CRNA    Anesthesia Type: general ASA Status: 1          Anesthesia Type: general    Vitals  No vitals data found for the desired time range.          Post Anesthesia Care and Evaluation    Patient location during evaluation: bedside  Patient participation: complete - patient cannot participate  Level of consciousness: sleepy but conscious  Pain score: 0  Pain management: adequate    Airway patency: patent  Anesthetic complications: No anesthetic complications  PONV Status: none  Cardiovascular status: acceptable  Respiratory status: acceptable  Hydration status: acceptable  No anesthesia care post op

## 2023-02-28 NOTE — ANESTHESIA PREPROCEDURE EVALUATION
Anesthesia Evaluation     Patient summary reviewed and Nursing notes reviewed   NPO Solid Status: > 8 hours  NPO Liquid Status: > 2 hours           Airway   Mallampati: I  TM distance: >3 FB  Neck ROM: full  No difficulty expected  Dental - normal exam     Pulmonary - negative pulmonary ROS and normal exam    breath sounds clear to auscultation  Cardiovascular - negative cardio ROS and normal exam  Exercise tolerance: excellent (>7 METS)    Rhythm: regular  Rate: normal        Neuro/Psych  (+) psychiatric history Depression and Anxiety,    GI/Hepatic/Renal/Endo    (+)  GI bleeding lower active bleeding,     Musculoskeletal (-) negative ROS    Abdominal  - normal exam   Substance History - negative use     OB/GYN negative ob/gyn ROS         Other - negative ROS       ROS/Med Hx Other: Pt. Breastfeeding and has chronic mastitis                Anesthesia Plan    ASA 1     general   total IV anesthesia  intravenous induction     Anesthetic plan, risks, benefits, and alternatives have been provided, discussed and informed consent has been obtained with: patient.  Pre-procedure education provided  Plan discussed with CRNA and resident.        CODE STATUS:

## 2023-03-09 ENCOUNTER — OFFICE VISIT (OUTPATIENT)
Dept: GASTROENTEROLOGY | Facility: CLINIC | Age: 33
End: 2023-03-09
Payer: COMMERCIAL

## 2023-03-09 VITALS
WEIGHT: 157 LBS | SYSTOLIC BLOOD PRESSURE: 124 MMHG | HEART RATE: 88 BPM | DIASTOLIC BLOOD PRESSURE: 72 MMHG | HEIGHT: 63 IN | BODY MASS INDEX: 27.82 KG/M2

## 2023-03-09 DIAGNOSIS — K62.5 RECTAL BLEEDING: ICD-10-CM

## 2023-03-09 DIAGNOSIS — K64.8 INTERNAL HEMORRHOIDS: ICD-10-CM

## 2023-03-09 DIAGNOSIS — K62.89 ANAL OR RECTAL PAIN: Primary | ICD-10-CM

## 2023-03-09 PROCEDURE — 99213 OFFICE O/P EST LOW 20 MIN: CPT | Performed by: PHYSICIAN ASSISTANT

## 2023-03-09 NOTE — PROGRESS NOTES
[facility]      Chief Complaint:   Chief Complaint   Patient presents with   • Follow-up     endo       Subjective     HPI:   Patient presents for follow-up after having colonoscopy completed due to anorectal pain and rectal bleeding.  At her last visit, patient was given a prescription for Xylocaine 2% jelly compounded with 0.3 nifedipine topical.  Patient states this has been ineffective in alleviating her pain.  Patient has also been using Anusol HC suppositories or Proctofoam and states this is also been ineffective.  Patient states she still has 5/10 pain with initiation of a bowel movement and for several hours following a bowel movement.  Patient previously reported constipation but indicates today that her bowel movements are soft after beginning fiber supplementation and continuing stool softeners.  Patient states she continues to have some rectal bleeding.    Colonoscopy report shows adequate prep and finding of small internal hemorrhoids that are nonbleeding.    Assessment/plan:  1.  Anorectal pain  2.  Small internal hemorrhoids    Patient will be referred to colorectal surgery for evaluation due to continued pain despite medications.  Patient may continue previously prescribed medications until her appointment with colorectal surgery.  Also advised to continue high-fiber diet and stool softeners.  Patient will need repeat screening colonoscopy at the age of 46.    Past Medical History:   Past Medical History:   Diagnosis Date   • Anxiety    • Depression    • Hip pain    • Mastitis        Past Surgical History:    Past Surgical History:   Procedure Laterality Date   • COLONOSCOPY N/A 2/28/2023    Procedure: COLONOSCOPY;  Surgeon: Brett Moseley MD;  Location: [facility] ENDOSCOPY;  Service: Gastroenterology;  Laterality: N/A;   • EAR TUBES      X 3 as a child   • WISDOM TOOTH EXTRACTION         Family History:  Family History   Problem Relation Age of Onset   •  Colon cancer Father    • Hypertension Father    • Cancer Father    • Cancer Mother         non hodgkins lymphoma   • Stroke Paternal Grandfather        Social History:   reports that she has never smoked. She has never used smokeless tobacco. She reports that she does not currently use alcohol. She reports that she does not use drugs.    Medications:   Prior to Admission medications    Medication Sig Start Date End Date Taking? Authorizing Provider   busPIRone (BUSPAR) 5 MG tablet Take 1 tablet by mouth 3 (Three) Times a Day. 2/7/23   Anya Camarena APRN   Cholecalciferol (VITAMIN D-3 PO) Take 8,000 Units by mouth Daily.    ProviderCecily MD   docusate sodium (COLACE) 100 MG capsule Take 1 capsule by mouth 3 (Three) Times a Day.    ProviderCecily MD   Hydrocort-Pramoxine, Perianal, (Proctofoam HC) 1-1 % rectal foam Insert 1 application into the rectum 2 (Two) Times a Day. 2/24/23   Sheryl Arellano PA-C   Lactobacillus (PROBIOTIC ACIDOPHILUS PO) Take 1 capsule by mouth Daily.    ProviderCecily MD   Lidocaine HCl gel (XYLOCAINE) 2 % gel  2/1/23   Cecily Guerrero MD   norethindrone (MICRONOR) 0.35 MG tablet Take 1 tablet by mouth Daily. 10/18/22 10/18/23  Vida Post MD   Prenatal Vit-Fe Fumarate-FA (PRENATAL, CLASSIC, VITAMIN) 28-0.8 MG tablet tablet Take  by mouth Daily.    ProviderCecily MD   sodium-potassium-magnesium sulfates (Suprep Bowel Prep Kit) 17.5-3.13-1.6 GM/177ML solution oral solution Take 1 bottle by mouth Every 12 (Twelve) Hours. 2/24/23 3/9/23  Sheryl Arellano PA-C       Allergies:  Ibuprofen    ROS:    Review of Systems   Constitutional: Negative.  Negative for fever and unexpected weight change.   HENT: Negative.    Eyes: Negative.    Respiratory: Negative.  Negative for shortness of breath.    Cardiovascular: Negative.  Negative for chest pain.   Gastrointestinal: Positive for anal bleeding and rectal pain. Negative for  "constipation.   Endocrine: Negative.    Genitourinary: Negative.    Skin: Negative.    Neurological: Negative.    Hematological: Negative.    Psychiatric/Behavioral: Negative.      Objective     Blood pressure 124/72, pulse 88, height 160 cm (63\"), weight 71.2 kg (157 lb), currently breastfeeding.    Physical Exam  Vitals and nursing note reviewed.   Constitutional:       Appearance: Normal appearance.   HENT:      Head: Normocephalic and atraumatic.   Pulmonary:      Effort: Pulmonary effort is normal.   Neurological:      General: No focal deficit present.      Mental Status: She is alert.   Psychiatric:         Mood and Affect: Affect is tearful.         Behavior: Behavior normal.          Assessment & Plan   Diagnoses and all orders for this visit:    1. Anal or rectal pain (Primary)  -     Ambulatory Referral to Colorectal Surgery    2. Rectal bleeding  -     Ambulatory Referral to Colorectal Surgery    3. Internal hemorrhoids  -     Ambulatory Referral to Colorectal Surgery        * Surgery not found *     Diagnosis Plan   1. Anal or rectal pain  Ambulatory Referral to Colorectal Surgery      2. Rectal bleeding  Ambulatory Referral to Colorectal Surgery      3. Internal hemorrhoids  Ambulatory Referral to Colorectal Surgery          Anticipated Surgical Procedure:  Orders Placed This Encounter   Procedures   • Ambulatory Referral to Colorectal Surgery     Referral Priority:   Routine     Referral Type:   Consultation     Referral Reason:   Specialty Services Required     Referred to Provider:   Benjy Bailey MD     Requested Specialty:   Colon and Rectal Surgery     Number of Visits Requested:   1       The risks, benefits, and alternatives of this procedure have been discussed with the patient or the responsible party- the patient understands and agrees to proceed.                                                                "

## 2023-04-07 DIAGNOSIS — F41.9 ANXIETY: ICD-10-CM

## 2023-04-10 RX ORDER — BUSPIRONE HYDROCHLORIDE 5 MG/1
TABLET ORAL
Qty: 90 TABLET | Refills: 1 | Status: SHIPPED | OUTPATIENT
Start: 2023-04-10

## 2025-01-22 NOTE — PROGRESS NOTES
CC: Prenatal visit    Lissa Vargas is a 29 y.o.  at 30w1d.  Doing well.  Denies contractions, LOF, or VB.  Reports good FM. Is seeing MINOO Broussard for physical therapy for hip pain.     /82   Wt 85.9 kg (189 lb 6.4 oz)   LMP 2019 (Exact Date)   BMI 33.55 kg/m²   SVE: Deferred  Fundal Height (cm): 30 cm  Fetal Heart Rate: 160s     Problems (from 20 to present)     Problem Noted Resolved    Rh negative status during pregnancy in third trimester 7/15/2020 by Pattie Cook APRN No    Overview Signed 7/15/2020 11:52 AM by Pattie Cook APRN     Rhogam given 7/15/2020         Pregnancy related hip pain in second trimester, antepartum 2020 by Anusha Linares APRN No    Overview Addendum 2020 12:43 PM by Pattie Cook APRN     Seeing MINOO Broussard.          Supervision of high risk pregnancy in third trimester 3/26/2020 by Vida Post MD No    Overview Addendum 7/15/2020 11:51 AM by Pattie Cook APRN     O neg / Rubella immune / GBS unk  Dating: LMP = 1TUS (10wk)  Genetics: Declined  Tdap: given 7/15/20  Flu: Declined  Anatomy: Anatomy normal in appearance, Boy-name is a secret  1h Glucola: passed 1 hr  Rhogam: given 7/15/20  H&H/Plts: 28wk  Lab Results   Component Value Date    HGB 13.2 2020    HCT 39.6 2020     2020   Breast/BC undecided         History of anxiety 2020 by Anusha Linares APRN No    Overview Signed 3/26/2020  8:59 AM by Vida Post MD     Was on Wellbutrin XL         History of depression 2020 by Anusha Linares APRN No    Overview Signed 3/26/2020  8:58 AM by Vida Post MD     Was on Zoloft         Obesity affecting pregnancy in third trimester 2020 by Anusha Linares APRN No    Overview Signed 3/26/2020  8:58 AM by Vida Post MD     Class I obesity, PG BMI 33  Early glucola  Good nutrition is important when healing from an illness, injury, or surgery.  Follow any nutrition recommendations given to you during your hospital stay.   If you were given an oral nutrition supplement while in the hospital, continue to take this supplement at home.  You can take it with meals, in-between meals, and/or before bedtime. These supplements can be purchased at most local grocery stores, pharmacies, and chain [employer]-stores.   If you have any questions about your diet or nutrition, call the hospital and ask for the dietitian.            Low carb / High protein   WNL               A/P: Lissa Vargas is a 29 y.o.  at 30w1d.  - PTL and FKCs precautions.   - Centering session #5:  Reviewed early and active labor and comfort measures in pregnancy   - RTC in 2 weeks  for Centering session #6  - Reviewed COVID-19 visitation policy  - Reviewed COVID-19 precautions     Diagnosis Plan   1. 30 weeks gestation of pregnancy     2. Obesity affecting pregnancy in third trimester     3. Pregnancy related hip pain in second trimester, antepartum     4. Supervision of high risk pregnancy in third trimester       Pattie Yu, APRN  2020  12:50

## (undated) DEVICE — CANN SMPL SOFTECH BIFLO ETCO2 A/M 7FT